# Patient Record
Sex: MALE | Race: BLACK OR AFRICAN AMERICAN | NOT HISPANIC OR LATINO | URBAN - METROPOLITAN AREA
[De-identification: names, ages, dates, MRNs, and addresses within clinical notes are randomized per-mention and may not be internally consistent; named-entity substitution may affect disease eponyms.]

---

## 2018-05-11 ENCOUNTER — INPATIENT (INPATIENT)
Facility: HOSPITAL | Age: 48
LOS: 5 days | Discharge: HOME | DRG: 227 | End: 2018-05-17
Attending: INTERNAL MEDICINE | Admitting: INTERNAL MEDICINE
Payer: MEDICAID

## 2018-05-11 VITALS — WEIGHT: 203.05 LBS | HEIGHT: 70 IN

## 2018-05-11 DIAGNOSIS — I25.110 ATHEROSCLEROTIC HEART DISEASE OF NATIVE CORONARY ARTERY WITH UNSTABLE ANGINA PECTORIS: ICD-10-CM

## 2018-05-11 DIAGNOSIS — R55 SYNCOPE AND COLLAPSE: ICD-10-CM

## 2018-05-11 DIAGNOSIS — I50.22 CHRONIC SYSTOLIC (CONGESTIVE) HEART FAILURE: ICD-10-CM

## 2018-05-11 DIAGNOSIS — I82.90 ACUTE EMBOLISM AND THROMBOSIS OF UNSPECIFIED VEIN: ICD-10-CM

## 2018-05-11 LAB
ALBUMIN SERPL ELPH-MCNC: 4.6 G/DL — SIGNIFICANT CHANGE UP (ref 3.3–5.2)
ALP SERPL-CCNC: 73 U/L — SIGNIFICANT CHANGE UP (ref 40–120)
ALT FLD-CCNC: 19 U/L — SIGNIFICANT CHANGE UP
ANION GAP SERPL CALC-SCNC: 14 MMOL/L — SIGNIFICANT CHANGE UP (ref 5–17)
APTT BLD: 47.7 SEC — HIGH (ref 27.5–37.4)
AST SERPL-CCNC: 23 U/L — SIGNIFICANT CHANGE UP
BASOPHILS # BLD AUTO: 0 K/UL — SIGNIFICANT CHANGE UP (ref 0–0.2)
BASOPHILS NFR BLD AUTO: 0.2 % — SIGNIFICANT CHANGE UP (ref 0–2)
BILIRUB SERPL-MCNC: 0.3 MG/DL — LOW (ref 0.4–2)
BUN SERPL-MCNC: 10 MG/DL — SIGNIFICANT CHANGE UP (ref 8–20)
CALCIUM SERPL-MCNC: 9.5 MG/DL — SIGNIFICANT CHANGE UP (ref 8.6–10.2)
CHLORIDE SERPL-SCNC: 98 MMOL/L — SIGNIFICANT CHANGE UP (ref 98–107)
CO2 SERPL-SCNC: 27 MMOL/L — SIGNIFICANT CHANGE UP (ref 22–29)
CREAT SERPL-MCNC: 1.24 MG/DL — SIGNIFICANT CHANGE UP (ref 0.5–1.3)
EOSINOPHIL # BLD AUTO: 0.2 K/UL — SIGNIFICANT CHANGE UP (ref 0–0.5)
EOSINOPHIL NFR BLD AUTO: 4 % — SIGNIFICANT CHANGE UP (ref 0–5)
GLUCOSE SERPL-MCNC: 90 MG/DL — SIGNIFICANT CHANGE UP (ref 70–115)
HCT VFR BLD CALC: 49.7 % — SIGNIFICANT CHANGE UP (ref 42–52)
HGB BLD-MCNC: 16.5 G/DL — SIGNIFICANT CHANGE UP (ref 14–18)
INR BLD: 1.29 RATIO — HIGH (ref 0.88–1.16)
LYMPHOCYTES # BLD AUTO: 2.4 K/UL — SIGNIFICANT CHANGE UP (ref 1–4.8)
LYMPHOCYTES # BLD AUTO: 44.9 % — SIGNIFICANT CHANGE UP (ref 20–55)
MCHC RBC-ENTMCNC: 29.3 PG — SIGNIFICANT CHANGE UP (ref 27–31)
MCHC RBC-ENTMCNC: 33.2 G/DL — SIGNIFICANT CHANGE UP (ref 32–36)
MCV RBC AUTO: 88.3 FL — SIGNIFICANT CHANGE UP (ref 80–94)
MONOCYTES # BLD AUTO: 0.6 K/UL — SIGNIFICANT CHANGE UP (ref 0–0.8)
MONOCYTES NFR BLD AUTO: 11.6 % — HIGH (ref 3–10)
NEUTROPHILS # BLD AUTO: 2.1 K/UL — SIGNIFICANT CHANGE UP (ref 1.8–8)
NEUTROPHILS NFR BLD AUTO: 39.3 % — SIGNIFICANT CHANGE UP (ref 37–73)
NT-PROBNP SERPL-SCNC: 90 PG/ML — SIGNIFICANT CHANGE UP (ref 0–300)
PLATELET # BLD AUTO: 197 K/UL — SIGNIFICANT CHANGE UP (ref 150–400)
POTASSIUM SERPL-MCNC: 4.5 MMOL/L — SIGNIFICANT CHANGE UP (ref 3.5–5.3)
POTASSIUM SERPL-SCNC: 4.5 MMOL/L — SIGNIFICANT CHANGE UP (ref 3.5–5.3)
PROT SERPL-MCNC: 7.2 G/DL — SIGNIFICANT CHANGE UP (ref 6.6–8.7)
PROTHROM AB SERPL-ACNC: 14.3 SEC — HIGH (ref 9.8–12.7)
RBC # BLD: 5.63 M/UL — SIGNIFICANT CHANGE UP (ref 4.6–6.2)
RBC # FLD: 12.8 % — SIGNIFICANT CHANGE UP (ref 11–15.6)
SODIUM SERPL-SCNC: 139 MMOL/L — SIGNIFICANT CHANGE UP (ref 135–145)
TROPONIN T SERPL-MCNC: <0.01 NG/ML — SIGNIFICANT CHANGE UP (ref 0–0.06)
TROPONIN T SERPL-MCNC: <0.01 NG/ML — SIGNIFICANT CHANGE UP (ref 0–0.06)
WBC # BLD: 5.3 K/UL — SIGNIFICANT CHANGE UP (ref 4.8–10.8)
WBC # FLD AUTO: 5.3 K/UL — SIGNIFICANT CHANGE UP (ref 4.8–10.8)

## 2018-05-11 PROCEDURE — 93010 ELECTROCARDIOGRAM REPORT: CPT

## 2018-05-11 PROCEDURE — 99223 1ST HOSP IP/OBS HIGH 75: CPT

## 2018-05-11 PROCEDURE — 99291 CRITICAL CARE FIRST HOUR: CPT

## 2018-05-11 PROCEDURE — 71045 X-RAY EXAM CHEST 1 VIEW: CPT | Mod: 26

## 2018-05-11 PROCEDURE — 99497 ADVNCD CARE PLAN 30 MIN: CPT | Mod: 25

## 2018-05-11 RX ORDER — METOPROLOL TARTRATE 50 MG
0 TABLET ORAL
Qty: 0 | Refills: 0 | COMMUNITY

## 2018-05-11 RX ORDER — RIVAROXABAN 15 MG-20MG
15 KIT ORAL
Qty: 0 | Refills: 0 | Status: DISCONTINUED | OUTPATIENT
Start: 2018-05-11 | End: 2018-05-13

## 2018-05-11 RX ORDER — METOPROLOL TARTRATE 50 MG
100 TABLET ORAL DAILY
Qty: 0 | Refills: 0 | Status: DISCONTINUED | OUTPATIENT
Start: 2018-05-12 | End: 2018-05-12

## 2018-05-11 RX ORDER — CLOPIDOGREL BISULFATE 75 MG/1
75 TABLET, FILM COATED ORAL DAILY
Qty: 0 | Refills: 0 | Status: DISCONTINUED | OUTPATIENT
Start: 2018-05-12 | End: 2018-05-17

## 2018-05-11 RX ORDER — HEPARIN SODIUM 5000 [USP'U]/ML
5000 INJECTION INTRAVENOUS; SUBCUTANEOUS EVERY 12 HOURS
Qty: 0 | Refills: 0 | Status: DISCONTINUED | OUTPATIENT
Start: 2018-05-11 | End: 2018-05-11

## 2018-05-11 RX ORDER — HYDRALAZINE HCL 50 MG
10 TABLET ORAL ONCE
Qty: 0 | Refills: 0 | Status: COMPLETED | OUTPATIENT
Start: 2018-05-11 | End: 2018-05-13

## 2018-05-11 RX ORDER — ASPIRIN/CALCIUM CARB/MAGNESIUM 324 MG
81 TABLET ORAL DAILY
Qty: 0 | Refills: 0 | Status: DISCONTINUED | OUTPATIENT
Start: 2018-05-12 | End: 2018-05-17

## 2018-05-11 RX ORDER — ATORVASTATIN CALCIUM 80 MG/1
80 TABLET, FILM COATED ORAL AT BEDTIME
Qty: 0 | Refills: 0 | Status: DISCONTINUED | OUTPATIENT
Start: 2018-05-12 | End: 2018-05-12

## 2018-05-11 RX ORDER — CLOPIDOGREL BISULFATE 75 MG/1
1 TABLET, FILM COATED ORAL
Qty: 0 | Refills: 0 | COMMUNITY

## 2018-05-11 RX ADMIN — Medication 10 MILLIGRAM(S): at 21:12

## 2018-05-11 NOTE — H&P ADULT - PROBLEM SELECTOR PLAN 1
admit to telemetry  EKG reviewed and repeated, diffuse T wave inversions   EKG prn chest pain/palpitations  NTG prn chest pain  ASA, BB, ACE, statin continued  serial troponin  TTE  HbA1c  lipid panel  CT head  carotid doppler  fall precautions  cardiology consulted-[facility] cardiology recs appreciated

## 2018-05-11 NOTE — H&P ADULT - ASSESSMENT
48 yo male with significant cardiac history admitted with near syncope likely due to cardiac etiology in setting of severely reduced systolic chf and apical thrombus

## 2018-05-11 NOTE — ED STATDOCS - PROGRESS NOTE DETAILS
Pt. with dizziness and chest pain. Pt. with multiple cardiac risk factors. labs ordered and pt. will be sent to the main room for further evaluations.

## 2018-05-11 NOTE — ED ADULT NURSE NOTE - CHPI ED SYMPTOMS NEG
no chills/no vomiting/no cough/no back pain/no chest pain/no diaphoresis/no fever/no nausea/no syncope/no shortness of breath

## 2018-05-11 NOTE — ED ADULT NURSE NOTE - OBJECTIVE STATEMENT
pt c/o palpations with dizziness and weakness when he gets up too quickly rr even and unlabored pt h/o heart attack with 2 stents seen at [facility]  a year ago.

## 2018-05-11 NOTE — CONSULT NOTE ADULT - SUBJECTIVE AND OBJECTIVE BOX
CARDIOLOGY CONSULTATION NOTE   Consult requested by:      Reason for Consultation:     History obtained by: Patient, family and medical record     obtained: No    Chief complaint:    Patient is a 47y old  Male who presents with a chief complaint of     HPI:        REVIEW OF SYMPTOMS:   Constitutional: Denied: fever, chills, weight loss or gain  Eyes: Denied: reddened ayes, eye discharge, eye pain  ENMT: Denied: ear pain, nasal discharge, mouth pain, throat pain or swelling  Cardiovascular: Denied: chest pain,  Chest pressure, palpitation, arrhythmia, irregular or fast heart beats, edema  Respiratory: Denied: cough, phlegm production, wheezes, dyspnea, orthopnea, PND,   GI: Denied: Abdominal pain, nausea, vomiting, diarrhea, constipation, melena, rectal bleed  : Denied: hematuria, frequency  Musculoskeletal: Denied: Muscle aches, weakness, pain  Hematology/lymp: Denied: Bleeding disorder, anemia, blood clotting  Neuro: Denied: Headache, light headedness, dizziness, numbness, aphasia, dysarthria, seizure,  syncope, near syncope  Psych: Denied: depression, anxiety  Integumentary/skin: Denied: rash, bruises, ecchymosis, itching  Allergy/Immunology: denied environmental or drug allergies    ALL OTHER REVIEW OF SYSTEMS ARE NEGATIVE.    MEDICATIONS  (STANDING):  heparin  Injectable 5000 Unit(s) SubCutaneous every 12 hours    MEDICATIONS  (PRN):        PAST MEDICAL & SURGICAL HISTORY:  CAD (coronary artery disease)  Stented coronary artery  Thrombosis: apical  MI (myocardial infarction): 2 coronary stents  HTN (hypertension)  No significant past surgical history      FAMILY HISTORY:      SOCIAL HISTORY:    CIGARETTES:  No    ALCOHOL: No    DRUGS: No    Vital Signs Last 24 Hrs  T(C): 37.1 (11 May 2018 19:11), Max: 37.1 (11 May 2018 19:11)  T(F): 98.8 (11 May 2018 19:11), Max: 98.8 (11 May 2018 19:11)  HR: 63 (11 May 2018 19:11) (63 - 81)  BP: 157/94 (11 May 2018 19:11) (143/87 - 157/94)  BP(mean): --  RR: 20 (11 May 2018 19:11) (20 - 20)  SpO2: 96% (11 May 2018 19:11) (94% - 96%)    PHYSICAL EXAM:      Constitutional: No fever, chills, NAD, Comfortable    Eyes: Not reddened, no discharge    ENMT: No discharge, No pain    Neck: No JVD, No Bruit    Back: No CVA tenderness    Respiratory: Clear to auscultation bilaterally    Cardiovascular: RRR, Normal S1-2, No S3-, No friction rub murmur    Gastrointestinal: Soft, NT/ND. BS+, No organomegaly    Extremities: No edema    Vascular: Distal pulses intact    Neurological: Alert, awake, oriented, able to move extremities, speach is clear    Skin: No ecchymosis, no rash    Musculoskeletal: Non tender, no weaknss    Psychiatric: Aproppriate mood, no anxiety        INTERPRETATION OF TELEMETRY:    ECG:    I&O's Detail      LABS:                        16.5   5.3   )-----------( 197      ( 11 May 2018 15:25 )             49.7     05-11    139  |  98  |  10.0  ----------------------------<  90  4.5   |  27.0  |  1.24    Ca    9.5      11 May 2018 15:25    TPro  7.2  /  Alb  4.6  /  TBili  0.3<L>  /  DBili  x   /  AST  23  /  ALT  19  /  AlkPhos  73  05-11    CARDIAC MARKERS ( 11 May 2018 15:25 )  x     / <0.01 ng/mL / x     / x     / x          PT/INR - ( 11 May 2018 15:25 )   PT: 14.3 sec;   INR: 1.29 ratio         PTT - ( 11 May 2018 15:25 )  PTT:47.7 sec    I&O's Summary      RADIOLOGY & ADDITIONAL STUDIES:  X-ray:    PREVIOUS DIAGNOSTIC TESTING:      ECHO  FINDINGS: Date:                : LVEF=          ; RV function:       ; Valvular abnormalities: Mitral valve:           ;  Aortic valve:              ;Tricuspid valve:         ; Pulmonary pressures:        Pericardium:      STRESS    FINDINGS: Date:            ;      CATHETERIZATION  FINDINGS:  Date:              :  LAD:                       ;  LCx:                        ; RCA:                ;     Assesment and Plan:  This is a 47yMale with history of Syncope and collapse  H/o or current diagnosis of HF- ACEI/ARB contraindication unknown  MEWS Score  CAD (coronary artery disease)  Stented coronary artery  Thrombosis  MI (myocardial infarction)  HTN (hypertension)  CAD (coronary artery disease)  Stented coronary artery  Thrombosis  MI (myocardial infarction)  HTN (hypertension)  Near syncope  No significant past surgical history  No significant past surgical history  PALPITATIONS/DIZZY  Thrombosis  HTN (hypertension)  Stented coronary artery  CAD (coronary artery disease)   presents with Patient is a 47y old  Male who presents with a chief complaint of   1) CARDIOLOGY CONSULTATION NOTE       History obtained by: Patient, family and medical record     obtained: Yes    Chief complaint:    Patient is a 47y old  Male who presents with a chief complaint of     HPI:  47M hx MI s/p PCI in 2006 and STEMI in 9/2017 at [facility] elevation in  V2-4 LHC at [facility]: 70-80% mRCA in-stent restenosis and diffuse dLAD in stent restenosis noted on cath; mRCA stented with PEPE,  had LVEF at 35%, apical thrombus, maintained on AC; who was  deemed not  a candidate for life vest as pt was w/o insurance at that time. He lives in [facility] and [place] to [place] this Monday. He had an episode of lightheadedness, nearly falling down associated with palpitation this am thus came to ER. He stated he had similar episode while he was in [facility]. Denied dyspnea, orthopnea PND, edema, CP, nausea, vomiting. hematuria, melena.    REVIEW OF SYMPTOMS:   Constitutional: Denied: fever, chills, weight loss or gain  Eyes: Denied: reddened ayes, eye discharge, eye pain  ENMT: Denied: ear pain, nasal discharge, mouth pain, throat pain or swelling  Cardiovascular: Denied: chest pain,  Chest pressure,edema  Respiratory: Denied: cough, phlegm production, wheezes, dyspnea, orthopnea, PND,   GI: Denied: Abdominal pain, nausea, vomiting, diarrhea, constipation, melena, rectal bleed  : Denied: hematuria, frequency  Musculoskeletal: Denied: Muscle aches, weakness, pain  Hematology/lymp: Denied: Bleeding disorder, anemia, blood clotting  Neuro: Denied: Headache, light headedness, dizziness, numbness, aphasia, dysarthria, seizure,    Psych: Denied: depression, anxiety  Integumentary/skin: Denied: rash, bruises, ecchymosis, itching  Allergy/Immunology: denied environmental or drug allergies    ALL OTHER REVIEW OF SYSTEMS ARE NEGATIVE.    MEDICATIONS  (STANDING):  heparin  Injectable 5000 Unit(s) SubCutaneous every 12 hours    · 	enalapril 10 mg oral tablet: 1 tab(s) orally once a day, Last Dose Taken:    · 	Plavix 75 mg oral tablet: 1 tab(s) orally once a day, Last Dose Taken:    · 	Xarelto 20 mg oral tablet: 1 tab(s) orally once a day (in the evening), Last Dose Taken:    · 	metoprolol succinate 100 mg oral tablet, extended release: 1 tab(s) orally once a day, Last Dose Taken:    · 	Crestor 20 mg oral tablet: 1 tab(s) orally once a day (at bedtime), Last Dose Taken:      MEDICATIONS  (PRN):        PAST MEDICAL & SURGICAL HISTORY:  CAD (coronary artery disease)  Stented coronary artery  Thrombosis: apical  MI (myocardial infarction): 2 coronary stents  HTN (hypertension)  No significant past surgical history      FAMILY HISTORY:  NC    SOCIAL HISTORY:    CIGARETTES:  No    ALCOHOL: No    DRUGS: No    Vital Signs Last 24 Hrs  T(C): 37.1 (11 May 2018 19:11), Max: 37.1 (11 May 2018 19:11)  T(F): 98.8 (11 May 2018 19:11), Max: 98.8 (11 May 2018 19:11)  HR: 63 (11 May 2018 19:11) (63 - 81)  BP: 157/94 (11 May 2018 19:11) (143/87 - 157/94)  BP(mean): --  RR: 20 (11 May 2018 19:11) (20 - 20)  SpO2: 96% (11 May 2018 19:11) (94% - 96%)    PHYSICAL EXAM:      Constitutional: No fever, chills, NAD, Comfortable    Eyes: Not reddened, no discharge    ENMT: No discharge, No pain    Neck: No JVD, No Bruit    Back: No CVA tenderness    Respiratory: Clear to auscultation bilaterally    Cardiovascular: RRR, Normal S1-2, No S3-, No friction rub murmur    Gastrointestinal: Soft, NT/ND. BS+, No organomegaly    Extremities: No edema    Vascular: Distal pulses intact    Neurological: Alert, awake, oriented, able to move extremities, speach is clear    Skin: No ecchymosis, no rash    Musculoskeletal: Non tender, no weakness    Psychiatric: Appropriate mood, no anxiety        INTERPRETATION OF TELEMETRY:    ECG:   Ventricular Rate 80 BPM    Atrial Rate 80 BPM    P-R Interval 204 ms    QRS Duration 96 ms    Q-T Interval 374 ms    QTC Calculation(Bezet) 431 ms    P Axis 71 degrees    R Axis -23 degrees    T Axis 133 degrees    Diagnosis Line *** Poor data quality, interpretation may be adversely affected  Normal sinus rhythm  Inferior infarct , age undetermined  Anterolateral infarct , age undetermined  Abnormal ECG    Confirmed by Kelton Lieberman ([id number]) on 5/11/2018 6:04:26 PM    I&O's Detail      LABS:                        16.5   5.3   )-----------( 197      ( 11 May 2018 15:25 )             49.7     05-11    139  |  98  |  10.0  ----------------------------<  90  4.5   |  27.0  |  1.24    Ca    9.5      11 May 2018 15:25    TPro  7.2  /  Alb  4.6  /  TBili  0.3<L>  /  DBili  x   /  AST  23  /  ALT  19  /  AlkPhos  73  05-11    CARDIAC MARKERS ( 11 May 2018 15:25 )  x     / <0.01 ng/mL / x     / x     / x          PT/INR - ( 11 May 2018 15:25 )   PT: 14.3 sec;   INR: 1.29 ratio         PTT - ( 11 May 2018 15:25 )  PTT:47.7 sec    I&O's Summary

## 2018-05-11 NOTE — ED PROVIDER NOTE - CARE PLAN
Principal Discharge DX:	Near syncope  Secondary Diagnosis:	CAD (coronary artery disease)  Secondary Diagnosis:	Stented coronary artery

## 2018-05-11 NOTE — H&P ADULT - HISTORY OF PRESENT ILLNESS
Pt is a 48 yo male with a pmh/o CAD s/p PCI in 2006 at [facility] and MI in 9/17 at [facility] with recanalization of RCA and LAD restenosis with a mid RCA PEPE placement, found to have EF 35% and apical thrombus however was not deemed candidate for life vest as pt w/o insurance at time and lives between [place] and . Pt today presents to ED after experiencing episode of dizziness, palpitations and feeling as if he were going to faint. Pt states he had an episode where he 'passed out' and lost consciousness in [place] [facility] [place] while working out but did not undergo evaluation at that time. Pt denies any cp, diaphoresis, n/v/d, gu sx, gi sx, fever, cough, leg swelling. Pt states compliance with med regimen asa, plavix, xarelto.

## 2018-05-11 NOTE — ED PROVIDER NOTE - OBJECTIVE STATEMENT
48 y/o M w/ PMHx of CAD, apical thrombosis and HTN s/p MI with 2 stents on Xarelto, Aspirin (81mg) and Plavix presents with palpitation, light-headedness and dizziness. He describes sx as feeling as though he is going to fall down. Pt states he went to [facility] in [place] last year and had stent placed. Denies CP, SOB, abd pain, N/V, blurred vision, motor sensory or any other complaints at this time. 46 y/o M w/ PMHx of CAD, apical thrombosis and HTN s/p MI with 2 stents on Xarelto, Aspirin (81mg) and Plavix presents with palpitation, light-headedness and dizziness and almost passed out.  The patient had echo done 6 months ago showing EF of 35 percent.  He describes sx as feeling as though he is going to fall down. Pt states he went to [facility] in [place] last year and had stent placed. Denies CP, SOB, abd pain, N/V, blurred vision, motor sensory or any other complaints at this time.

## 2018-05-11 NOTE — ED PROVIDER NOTE - MEDICAL DECISION MAKING DETAILS
46 y/o M with CAD, MI stents and apical thrombosis concerning for cardiac dizziness. Will do labs and most likely admit.

## 2018-05-11 NOTE — ED ADULT NURSE NOTE - PMH
CAD (coronary artery disease)    HTN (hypertension)    MI (myocardial infarction)  2 coronary stents  Stented coronary artery    Thrombosis  apical

## 2018-05-11 NOTE — ED PROVIDER NOTE - PMH
CAD (coronary artery disease)    HTN (hypertension)    MI (myocardial infarction)    Stented coronary artery    Thrombosis  apical

## 2018-05-12 LAB
ALBUMIN SERPL ELPH-MCNC: 4.4 G/DL — SIGNIFICANT CHANGE UP (ref 3.3–5.2)
ALP SERPL-CCNC: 69 U/L — SIGNIFICANT CHANGE UP (ref 40–120)
ALT FLD-CCNC: 18 U/L — SIGNIFICANT CHANGE UP
AMPHET UR-MCNC: NEGATIVE — SIGNIFICANT CHANGE UP
ANION GAP SERPL CALC-SCNC: 12 MMOL/L — SIGNIFICANT CHANGE UP (ref 5–17)
APPEARANCE UR: CLEAR — SIGNIFICANT CHANGE UP
APTT BLD: 38.5 SEC — HIGH (ref 27.5–37.4)
AST SERPL-CCNC: 20 U/L — SIGNIFICANT CHANGE UP
BARBITURATES UR SCN-MCNC: NEGATIVE — SIGNIFICANT CHANGE UP
BENZODIAZ UR-MCNC: NEGATIVE — SIGNIFICANT CHANGE UP
BILIRUB SERPL-MCNC: 0.5 MG/DL — SIGNIFICANT CHANGE UP (ref 0.4–2)
BILIRUB UR-MCNC: NEGATIVE — SIGNIFICANT CHANGE UP
BUN SERPL-MCNC: 12 MG/DL — SIGNIFICANT CHANGE UP (ref 8–20)
CALCIUM SERPL-MCNC: 9 MG/DL — SIGNIFICANT CHANGE UP (ref 8.6–10.2)
CHLORIDE SERPL-SCNC: 100 MMOL/L — SIGNIFICANT CHANGE UP (ref 98–107)
CHOLEST SERPL-MCNC: 138 MG/DL — SIGNIFICANT CHANGE UP (ref 110–199)
CO2 SERPL-SCNC: 30 MMOL/L — HIGH (ref 22–29)
COCAINE METAB.OTHER UR-MCNC: NEGATIVE — SIGNIFICANT CHANGE UP
COLOR SPEC: YELLOW — SIGNIFICANT CHANGE UP
CREAT SERPL-MCNC: 1 MG/DL — SIGNIFICANT CHANGE UP (ref 0.5–1.3)
DIFF PNL FLD: NEGATIVE — SIGNIFICANT CHANGE UP
GLUCOSE SERPL-MCNC: 89 MG/DL — SIGNIFICANT CHANGE UP (ref 70–115)
GLUCOSE UR QL: NEGATIVE MG/DL — SIGNIFICANT CHANGE UP
HBA1C BLD-MCNC: 5.9 % — HIGH (ref 4–5.6)
HCT VFR BLD CALC: 51.4 % — SIGNIFICANT CHANGE UP (ref 42–52)
HDLC SERPL-MCNC: 44 MG/DL — LOW
HGB BLD-MCNC: 16.9 G/DL — SIGNIFICANT CHANGE UP (ref 14–18)
INR BLD: 1.12 RATIO — SIGNIFICANT CHANGE UP (ref 0.88–1.16)
KETONES UR-MCNC: NEGATIVE — SIGNIFICANT CHANGE UP
LEUKOCYTE ESTERASE UR-ACNC: NEGATIVE — SIGNIFICANT CHANGE UP
LIPID PNL WITH DIRECT LDL SERPL: 84 MG/DL — SIGNIFICANT CHANGE UP
MAGNESIUM SERPL-MCNC: 2.2 MG/DL — SIGNIFICANT CHANGE UP (ref 1.6–2.6)
MCHC RBC-ENTMCNC: 28.6 PG — SIGNIFICANT CHANGE UP (ref 27–31)
MCHC RBC-ENTMCNC: 32.9 G/DL — SIGNIFICANT CHANGE UP (ref 32–36)
MCV RBC AUTO: 87.1 FL — SIGNIFICANT CHANGE UP (ref 80–94)
METHADONE UR-MCNC: NEGATIVE — SIGNIFICANT CHANGE UP
NITRITE UR-MCNC: NEGATIVE — SIGNIFICANT CHANGE UP
OPIATES UR-MCNC: NEGATIVE — SIGNIFICANT CHANGE UP
PCP SPEC-MCNC: SIGNIFICANT CHANGE UP
PCP UR-MCNC: NEGATIVE — SIGNIFICANT CHANGE UP
PH UR: 7 — SIGNIFICANT CHANGE UP (ref 5–8)
PHOSPHATE SERPL-MCNC: 3.9 MG/DL — SIGNIFICANT CHANGE UP (ref 2.4–4.7)
PLATELET # BLD AUTO: 207 K/UL — SIGNIFICANT CHANGE UP (ref 150–400)
POTASSIUM SERPL-MCNC: 3.8 MMOL/L — SIGNIFICANT CHANGE UP (ref 3.5–5.3)
POTASSIUM SERPL-SCNC: 3.8 MMOL/L — SIGNIFICANT CHANGE UP (ref 3.5–5.3)
PROT SERPL-MCNC: 7.4 G/DL — SIGNIFICANT CHANGE UP (ref 6.6–8.7)
PROT UR-MCNC: NEGATIVE MG/DL — SIGNIFICANT CHANGE UP
PROTHROM AB SERPL-ACNC: 12.3 SEC — SIGNIFICANT CHANGE UP (ref 9.8–12.7)
RBC # BLD: 5.9 M/UL — SIGNIFICANT CHANGE UP (ref 4.6–6.2)
RBC # FLD: 13 % — SIGNIFICANT CHANGE UP (ref 11–15.6)
SODIUM SERPL-SCNC: 142 MMOL/L — SIGNIFICANT CHANGE UP (ref 135–145)
SP GR SPEC: 1.01 — SIGNIFICANT CHANGE UP (ref 1.01–1.02)
THC UR QL: NEGATIVE — SIGNIFICANT CHANGE UP
TOTAL CHOLESTEROL/HDL RATIO MEASUREMENT: 3 RATIO — LOW (ref 3.4–9.6)
TRIGL SERPL-MCNC: 48 MG/DL — SIGNIFICANT CHANGE UP (ref 10–200)
TROPONIN T SERPL-MCNC: <0.01 NG/ML — SIGNIFICANT CHANGE UP (ref 0–0.06)
TSH SERPL-MCNC: 1.35 UIU/ML — SIGNIFICANT CHANGE UP (ref 0.27–4.2)
UROBILINOGEN FLD QL: NEGATIVE MG/DL — SIGNIFICANT CHANGE UP
WBC # BLD: 5.2 K/UL — SIGNIFICANT CHANGE UP (ref 4.8–10.8)
WBC # FLD AUTO: 5.2 K/UL — SIGNIFICANT CHANGE UP (ref 4.8–10.8)

## 2018-05-12 PROCEDURE — 99233 SBSQ HOSP IP/OBS HIGH 50: CPT

## 2018-05-12 PROCEDURE — 93880 EXTRACRANIAL BILAT STUDY: CPT | Mod: 26

## 2018-05-12 PROCEDURE — 99222 1ST HOSP IP/OBS MODERATE 55: CPT

## 2018-05-12 PROCEDURE — 70450 CT HEAD/BRAIN W/O DYE: CPT | Mod: 26

## 2018-05-12 RX ORDER — METOPROLOL TARTRATE 50 MG
25 TABLET ORAL
Qty: 0 | Refills: 0 | Status: DISCONTINUED | OUTPATIENT
Start: 2018-05-12 | End: 2018-05-12

## 2018-05-12 RX ORDER — ROSUVASTATIN CALCIUM 5 MG/1
20 TABLET ORAL AT BEDTIME
Qty: 0 | Refills: 0 | Status: DISCONTINUED | OUTPATIENT
Start: 2018-05-12 | End: 2018-05-17

## 2018-05-12 RX ORDER — METOPROLOL TARTRATE 50 MG
25 TABLET ORAL
Qty: 0 | Refills: 0 | Status: DISCONTINUED | OUTPATIENT
Start: 2018-05-13 | End: 2018-05-13

## 2018-05-12 RX ADMIN — RIVAROXABAN 15 MILLIGRAM(S): KIT at 05:09

## 2018-05-12 RX ADMIN — Medication 10 MILLIGRAM(S): at 05:09

## 2018-05-12 RX ADMIN — Medication 100 MILLIGRAM(S): at 05:09

## 2018-05-12 RX ADMIN — RIVAROXABAN 15 MILLIGRAM(S): KIT at 17:19

## 2018-05-12 RX ADMIN — ROSUVASTATIN CALCIUM 20 MILLIGRAM(S): 5 TABLET ORAL at 21:59

## 2018-05-12 RX ADMIN — CLOPIDOGREL BISULFATE 75 MILLIGRAM(S): 75 TABLET, FILM COATED ORAL at 12:54

## 2018-05-12 RX ADMIN — Medication 81 MILLIGRAM(S): at 12:54

## 2018-05-12 NOTE — PROGRESS NOTE ADULT - SUBJECTIVE AND OBJECTIVE BOX
Patient seen and examined . S/p  , POD #     CC :     HPI:  Pt is a 46 yo male with a pmh/o CAD s/p PCI in 2006 at [facility] and MI in 9/17 at [facility] with recanalization of RCA and LAD restenosis with a mid RCA PEPE placement, found to have EF 35% and apical thrombus however was not deemed candidate for life vest as pt w/o insurance at time and lives between [facility] and . Pt today presents to ED after experiencing episode of dizziness, palpitations and feeling as if he were going to faint. Pt states he had an episode where he 'passed out' and lost consciousness in [facility] while working out but did not undergo evaluation at that time. Pt denies any cp, diaphoresis, n/v/d, gu sx, gi sx, fever, cough, leg swelling. Pt states compliance with med regimen asa, plavix, xarelto. (11 May 2018 20:04)      PAST MEDICAL & SURGICAL HISTORY:  CAD (coronary artery disease)  Stented coronary artery  Thrombosis: apical  MI (myocardial infarction): 2 coronary stents  HTN (hypertension)  PCI  No significant past surgical history      MEDICATIONS  (STANDING):  aspirin enteric coated 81 milliGRAM(s) Oral daily  atorvastatin 80 milliGRAM(s) Oral at bedtime  clopidogrel Tablet 75 milliGRAM(s) Oral daily  enalapril 10 milliGRAM(s) Oral daily  metoprolol succinate  milliGRAM(s) Oral daily  rivaroxaban 15 milliGRAM(s) Oral two times a day    MEDICATIONS  (PRN):  hydrALAZINE Injectable 10 milliGRAM(s) IV Push once PRN sbp >170      LABS:                          16.9   5.2   )-----------( 207      ( 12 May 2018 06:01 )             51.4     05-12    142  |  100  |  12.0  ----------------------------<  89  3.8   |  30.0<H>  |  1.00    Ca    9.0      12 May 2018 06:01  Phos  3.9     05-12  Mg     2.2     05-12    TPro  7.4  /  Alb  4.4  /  TBili  0.5  /  DBili  x   /  AST  20  /  ALT  18  /  AlkPhos  69  05-12    PT/INR - ( 12 May 2018 06:01 )   PT: 12.3 sec;   INR: 1.12 ratio         PTT - ( 12 May 2018 06:01 )  PTT:38.5 sec      RADIOLOGY & ADDITIONAL TESTS:    < from: Xray Chest 1 View AP/PA. (05.11.18 @ 15:12) >   EXAM:  XR CHEST AP OR PA 1V                          PROCEDURE DATE:  05/11/2018          INTERPRETATION:  CHEST AP PORTABLE:    History: Chest Pain.     Date and time of exam: 5/11/2018 2:54 PM.    Technique: A single AP view of the chest was obtained.    Comparison exam: No prior exam.    Findings:  The lungs are clear. The heart and mediastinum are unremarkable. No hilar   abnormality. No evidence of a pleural effusion. No osseous abnormality..    Impression:  Unremarkable exam..                DAMIAN THAYER M.D., ATTENDING RADIOLOGIST  This document has been electronically signed. May 11 2018  3:28PM    < end of copied text >      < from: 12 Lead ECG (05.11.18 @ 14:03) >    Ventricular Rate 80 BPM    Atrial Rate 80 BPM    P-R Interval 204 ms    QRS Duration 96 ms    Q-T Interval 374 ms    QTC Calculation(Bezet) 431 ms    P Axis 71 degrees    R Axis -23 degrees    T Axis 133 degrees    Diagnosis Line *** Poor data quality, interpretation may be adversely affected  Normal sinus rhythm  Inferior infarct , age undetermined  Anterolateral infarct , age undetermined  Abnormal ECG    Confirmed by Kelton Lieberman ([id number]) on 5/11/2018 6:04:26 PM    < end of copied text >        REVIEW OF SYSTEMS:    CONSTITUTIONAL: No fever, weight loss, or fatigue  EYES: No eye pain, visual disturbances, or discharge  ENMT:  No difficulty hearing, tinnitus, vertigo; No sinus or throat pain  NECK: No pain or stiffness  RESPIRATORY: No cough, wheezing, chills or hemoptysis; No shortness of breath  CARDIOVASCULAR: No chest pain, palpitations, dizziness, or leg swelling  GASTROINTESTINAL: No abdominal or epigastric pain. No nausea, vomiting, or hematemesis; No diarrhea or constipation. No melena or hematochezia.  GENITOURINARY: No dysuria, frequency, hematuria, or incontinence  NEUROLOGICAL: No headaches, memory loss, loss of strength, numbness, or tremors  SKIN: No itching, burning, rashes, or lesions   LYMPH NODES: No enlarged glands  ENDOCRINE: No heat or cold intolerance; No hair loss  MUSCULOSKELETAL:   PSYCHIATRIC: No depression, anxiety, mood swings, or difficulty sleeping  HEME/LYMPH: No easy bruising, or bleeding gums  ALLERGY AND IMMUNOLOGIC: No hives or eczema    Vital Signs Last 24 Hrs  T(C): 36.7 (12 May 2018 09:51), Max: 37.1 (11 May 2018 19:11)  T(F): 98 (12 May 2018 09:51), Max: 98.8 (11 May 2018 19:11)  HR: 58 (12 May 2018 09:51) (58 - 81)  BP: 130/90 (12 May 2018 09:51) (130/90 - 160/102)  BP(mean): --  RR: 16 (12 May 2018 09:51) (16 - 20)  SpO2: 95% (12 May 2018 09:51) (94% - 99%)  PHYSICAL EXAM:    GENERAL: NAD, well-groomed, well-developed  HEAD:  Atraumatic, Normocephalic  EYES: EOMI, PERRLA, conjunctiva and sclera clear  NECK: Supple, No JVD, Normal thyroid  NERVOUS SYSTEM:  Alert & Oriented X3, no focal deficit  CHEST/LUNG: CTA b/l ,  no  rales, rhonchi, wheezing, or rubs  HEART: Regular rate and rhythm; No murmurs, rubs, or gallops  ABDOMEN: Soft, Nontender, Nondistended; Bowel sounds present  EXTREMITIES:  2+ Peripheral Pulses, No clubbing, cyanosis, or edema  LYMPH: No lymphadenopathy noted  SKIN: No rashes or lesions Patient seen and examined . Creole spiking only , RN Elza Claire helped with translation . Doing well , no sob , no cp , no dizziness / lightheadedness     CC : 46 yo male with significant cardiac history admitted with near syncope         PAST MEDICAL & SURGICAL HISTORY:  CAD (coronary artery disease)  Stented coronary artery  Thrombosis: apical  MI (myocardial infarction): 2 coronary stents  HTN (hypertension)  PCI  No significant past surgical history      MEDICATIONS  (STANDING):  aspirin enteric coated 81 milliGRAM(s) Oral daily  atorvastatin 80 milliGRAM(s) Oral at bedtime  clopidogrel Tablet 75 milliGRAM(s) Oral daily  enalapril 10 milliGRAM(s) Oral daily  metoprolol succinate  milliGRAM(s) Oral daily  rivaroxaban 15 milliGRAM(s) Oral two times a day    MEDICATIONS  (PRN):  hydrALAZINE Injectable 10 milliGRAM(s) IV Push once PRN sbp >170      LABS:                          16.9   5.2   )-----------( 207      ( 12 May 2018 06:01 )             51.4     05-12    142  |  100  |  12.0  ----------------------------<  89  3.8   |  30.0<H>  |  1.00    Ca    9.0      12 May 2018 06:01  Phos  3.9     05-12  Mg     2.2     05-12    TPro  7.4  /  Alb  4.4  /  TBili  0.5  /  DBili  x   /  AST  20  /  ALT  18  /  AlkPhos  69  05-12    PT/INR - ( 12 May 2018 06:01 )   PT: 12.3 sec;   INR: 1.12 ratio         PTT - ( 12 May 2018 06:01 )  PTT:38.5 sec      RADIOLOGY & ADDITIONAL TESTS:    < from: Xray Chest 1 View AP/PA. (05.11.18 @ 15:12) >   EXAM:  XR CHEST AP OR PA 1V                          PROCEDURE DATE:  05/11/2018          INTERPRETATION:  CHEST AP PORTABLE:    History: Chest Pain.     Date and time of exam: 5/11/2018 2:54 PM.    Technique: A single AP view of the chest was obtained.    Comparison exam: No prior exam.    Findings:  The lungs are clear. The heart and mediastinum are unremarkable. No hilar   abnormality. No evidence of a pleural effusion. No osseous abnormality..    Impression:  Unremarkable exam..                DAMIAN FAGELMAN M.D., ATTENDING RADIOLOGIST  This document has been electronically signed. May 11 2018  3:28PM    < end of copied text >      < from: 12 Lead ECG (05.11.18 @ 14:03) >    Ventricular Rate 80 BPM    Atrial Rate 80 BPM    P-R Interval 204 ms    QRS Duration 96 ms    Q-T Interval 374 ms    QTC Calculation(Bezet) 431 ms    P Axis 71 degrees    R Axis -23 degrees    T Axis 133 degrees    Diagnosis Line *** Poor data quality, interpretation may be adversely affected  Normal sinus rhythm  Inferior infarct , age undetermined  Anterolateral infarct , age undetermined  Abnormal ECG    Confirmed by Kelton Lieberman ([id number]) on 5/11/2018 6:04:26 PM    < end of copied text >        REVIEW OF SYSTEMS:    As above , all systems reviewed and are negative     Vital Signs Last 24 Hrs  T(C): 36.7 (12 May 2018 09:51), Max: 37.1 (11 May 2018 19:11)  T(F): 98 (12 May 2018 09:51), Max: 98.8 (11 May 2018 19:11)  HR: 58 (12 May 2018 09:51) (58 - 81)  BP: 130/90 (12 May 2018 09:51) (130/90 - 160/102)  BP(mean): --  RR: 16 (12 May 2018 09:51) (16 - 20)  SpO2: 95% (12 May 2018 09:51) (94% - 99%)    PHYSICAL EXAM:    GENERAL: NAD, well-groomed, well-developed  HEAD:  Atraumatic, Normocephalic  EYES: EOMI, PERRLA, conjunctiva and sclera clear  NECK: Supple, No JVD, Normal thyroid  NERVOUS SYSTEM:  Alert & Oriented X3, no focal deficit  CHEST/LUNG: CTA b/l ,  no  rales, rhonchi, wheezing, or rubs  HEART: Regular rate and rhythm; No murmurs, rubs, or gallops  ABDOMEN: Soft, Nontender, Nondistended; Bowel sounds present  EXTREMITIES:  2+ Peripheral Pulses, No clubbing, cyanosis, or edema  LYMPH: No lymphadenopathy noted  SKIN: No rashes or lesions

## 2018-05-12 NOTE — PROGRESS NOTE ADULT - ASSESSMENT
Assessment and Recommendation:   · Assessment		  47M hx MI s/p PCI in 2006 and STEMI in 9/2017 at [facility] elevation in  V2-4 LHC at [facility]: 70-80% mRCA in-stent restenosis and diffuse dLAD in stent restenosis noted on cath; mRCA tstented with PEPE,  had LVEF at 35%, apical thrombus, maintained on AC; who was  deemed not  a candidate for life vest as pt was w/o insurance at that time. He lives in [facility] and [place] this Monday. He had an episode of lightheadedness, nearly falling down associated with palpitation this am thus came to ER. He stated he had similar episode while he was in [facility]. Denied dyspnea, orthopnea PND, edema, CP, nausea, vomiting. hematuria, melena.    A/  Near Syncope, Lightheadedness, Dizziness  Palpitation  Ischemic CM  Chronic Systolic HF, HFrEF  LV apical thrombus  CAD: h/o MI, sp PCI  HTN    P/  Telemetry Monitoring  Echo  Serial CE, negative x 3.    BNP wnl  BP stable 130/90    Cont:   enalapril 10 mg daily  Plavix 75 mg daily  Xarelto 20 mg daily   metoprolol succinate 100 mg  daily  Crestor 20 mg daily Assessment and Recommendation:   · Assessment		  47M hx MI s/p PCI in 2006 and STEMI in 9/2017 at [facility] elevation in  V2-4 C at [facility]: Significant CAD disease,  had LVEF at 35%, apical thrombus, maintained on AC; who was deemed not  a candidate for life vest as pt was w/o insurance at that time.    Patient had IR s1s2, periods of  Wenckebach, and 2 to 1 block with bradycardia down to mid 30's.       A/  ·	Near Syncope, Lightheadedness, Dizziness - ct tele, Serial CE, negative x 3.    ·	Palpitations- resolved.    ·	Ischemic CM ct bb and statins, BNP wnl.     ·	Chronic Systolic HF, HFrEF, ct ACE.    ·	LV apical thrombus repeat TTE and ct Xaralto  ·	CAD: h/o MI, sp PCI - ct with Plavix   ·	HTN - ct ace and bb.  BP stable 130/90 on medications     Medications  enalapril 10 mg daily  Plavix 75 mg daily  Xarelto 20 mg daily   metoprolol tartrate 25 mg  bid  Crestor 20 mg daily Assessment and Recommendation:   · Assessment		  47M hx MI s/p PCI in 2006 and STEMI in 9/2017 at [facility] elevation in  V2-4 LHC at [facility]: Significant CAD disease,  had LVEF at 35%, apical thrombus, maintained on AC; who was deemed not  a candidate for life vest as pt was w/o insurance at that time.   Patient admitted for syncope   Patient had IR s1s2, periods of  Wenckebach, and 2 to 1 block with bradycardia down to mid 30's.       A/  ·	Near Syncope, Lightheadedness, Dizziness - ct tele, Serial CE, negative x 3.    ·	Palpitations- resolved.    ·	Ischemic CM ct bb and statins, BNP wnl.     ·	Chronic Systolic HF, HFrEF, on ACE.    ·	LV apical thrombus repeat TTE and ct Xarelto  ·	CAD: h/o MI, sp PCI - ct with Plavix   ·	HTN - ct ace and bb.  BP stable 130/90 on medications     Medications  enalapril 10 mg daily  Plavix 75 mg daily  Xarelto 20 mg daily   metoprolol tartrate 25 mg  bid  Crestor 20 mg daily 47M hx MI s/p PCI in 2006 and STEMI in 9/2017 at [facility] elevation in  V2-4 C at [facility]: Significant CAD disease,  had LVEF at 35%, apical thrombus, maintained on AC; who was deemed not  a candidate for life vest as pt was w/o insurance at that time.   Patient admitted for syncope   Patient had IR s1s2, periods of  Wenckebach, and 2 to 1 block with bradycardia down to mid 30's.       A/  ·	Near Syncope, Lightheadedness, Dizziness - ct tele, Serial CE, negative x 3.    ·	Palpitations- resolved.    ·	Ischemic CM ct bb (reduced dose b/o bradycardia)  and statins, BNP wnl.     ·	Chronic Systolic HF, HFrEF, on ACE.    ·	h/o LV apical thrombus on Xarelto  ·	Echo done: EF 35-40. WMA+. Severe septa hypertrophy.   ·	CAD: h/o MI, sp PCI - ct with Plavix   ·	HTN - ct ace and bb.  BP stable 130/90 on medications     Medications  enalapril 10 mg daily  Plavix 75 mg daily  Xarelto 20 mg daily   metoprolol tartrate 25 mg  bid  Crestor 20 mg daily

## 2018-05-12 NOTE — PROGRESS NOTE ADULT - ASSESSMENT
48 yo male with significant cardiac history admitted with near syncope likely due to cardiac etiology in setting of severely reduced systolic chf and apical thrombus       Problem/Plan - 1:  ·  Problem: Near syncope.  Plan: admit to telemetry  EKG reviewed and repeated, diffuse T wave inversions   EKG prn chest pain/palpitations  NTG prn chest pain  ASA, BB, ACE, statin continued  serial troponin  TTE  HbA1c  lipid panel  CT head  carotid doppler  fall precautions  cardiology consulted , note noted and appreciated .       Problem/Plan - 2:  ·  Problem: Chronic systolic congestive heart failure.  Plan: strict i/o  orthostatic bp  not in acute failure, lungs CTA BL, no edema noted.      Problem/Plan - 3:  ·  Problem:  Apical Thrombosis.  Plan: cont xarelto  no active bleeding noted.      Problem/Plan - 4:  ·  Problem: Coronary artery disease involving native coronary artery of native heart with unstable angina pectoris.  Plan: Cont asa, crestor, metoprolol, enalapril  f/u HbA1c and lipid panel  TTE pending . 46 yo male with significant cardiac history admitted with near syncope likely due to cardiac etiology in setting of severely reduced systolic chf and apical thrombus       Problem/Plan - 1:  ·  Problem: Near syncope.  Plan: continue   telemetry  EKG reviewed and repeated, diffuse T wave inversions   EKG prn chest pain/palpitations  NTG prn chest pain  ASA, BB, ACE, statin continued  serial troponin x 3 negative   TTE - pending   HbA1c - 5.9 - prediabetic , diet , education , follow HgA1C in 3 months   lipid panel - noted   CT head/  carotid doppler ordered and pending   fall precautions  cardiology consulted , note noted and appreciated .  Awaiting for TTE report and further mgmt .     Problem/Plan - 2:  ·  Problem: Chronic systolic congestive heart failure.  Plan: strict i/o  orthostatic bp  not in acute failure, lungs CTA BL, no edema noted.      Problem/Plan - 3:  ·  Problem:  Apical Thrombosis.  Plan: cont xarelto  no active bleeding noted.      Problem/Plan - 4:  ·  Problem: Coronary artery disease involving native coronary artery of native heart with unstable angina pectoris.  Plan: Cont asa, crestor, metoprolol, enalapril  f/u HbA1c and lipid panel  TTE pending . 48 yo male with significant cardiac history admitted with near syncope likely due to cardiac etiology in setting of severely reduced systolic chf and apical thrombus       Problem/Plan - 1:  ·  Problem: Near syncope.  Plan: continue   telemetry  EKG reviewed and repeated, diffuse T wave inversions   EKG prn chest pain/palpitations  NTG prn chest pain  ASA, BB, ACE, statin continued  serial troponin x 3 negative   TTE - pending   HbA1c - 5.9 - prediabetic , diet , education , follow HgA1C in 3 months   lipid panel - noted   CT head/  carotid doppler ordered and pending   fall precautions  cardiology consulted , note noted and appreciated .  Awaiting for TTE report and further mgmt .     Problem/Plan - 2:  ·  Problem: Chronic systolic congestive heart failure.  Plan: strict i/o  orthostatic bp  not in acute failure, lungs CTA BL, no edema noted.      Problem/Plan - 3:  ·  Problem:  Apical Thrombosis.  Plan: cont xarelto  no active bleeding noted.      Problem/Plan - 4:  ·  Problem: Coronary artery disease involving native coronary artery of native heart with unstable angina pectoris.  Plan: Cont asa, crestor, metoprolol, enalapril  f/u HbA1c and lipid panel  TTE pending .    Problem / Plan -5: DVT prophylaxis - patient is on Xarelto 48 yo male with significant cardiac history admitted with near syncope likely due to cardiac etiology in setting of severely reduced systolic chf and apical thrombus       Problem/Plan - 1:  ·  Problem: Near syncope.  Plan: continue   telemetry  EKG reviewed and repeated, diffuse T wave inversions   EKG prn chest pain/palpitations  NTG prn chest pain  ASA, BB, ACE, statin continued  serial troponin x 3 negative   TTE - pending   HbA1c - 5.9 - prediabetic , diet , education , follow HgA1C in 3 months   lipid panel - noted   CT head/  carotid doppler ordered and pending   fall precautions  cardiology consulted , note noted and appreciated .  Awaiting for TTE report and further mgmt .     Problem/Plan - 2:  ·  Problem: Chronic systolic congestive heart failure.  Plan: strict i/o  orthostatic bp  not in acute failure, lungs CTA BL, no edema noted.      Problem/Plan - 3:  ·  Problem:  Apical Thrombosis.  Plan: cont xarelto  no active bleeding noted.      Problem/Plan - 4:  ·  Problem: Coronary artery disease involving native coronary artery of native heart with unstable angina pectoris.  Plan: Cont asa, crestor, metoprolol, enalapril  f/u HbA1c and lipid panel  TTE pending .    Problem / Plan -5: DVT prophylaxis - patient is on Xarelto     Problem / Plan - 6: Wenckebach over night  / HR 35-45 now - patient asymptomatic , will d/w cardio and consider to hold on BB .

## 2018-05-12 NOTE — PROGRESS NOTE ADULT - SUBJECTIVE AND OBJECTIVE BOX
[place] [facility]                                                        Office: [address] [address]                                                       Telephone: [phone]. Fax:[phone]                                                                             PROGRESS NOTE   Reason for follow up:                             Overnight: No new events.   Update:     Subjective: "  ______________________"   Complains of:   Review of symptoms: Cardiac:  No chest pain. No dyspnea. No palpitations.  Respiratory:no cough. No dyspnea  Gastrointestinal: No diarrhea. No abdominal pain. No bleeding.     Past medical history: No updates.   Chronic conditions:  Hypertension, CAD, stents, Pre-DM: Stable;    	  Vitals:  T(C): 36.7 (05-12-18 @ 09:51), Max: 37.1 (05-11-18 @ 19:11)  HR: 58 (05-12-18 @ 09:51) (58 - 81)  BP: 130/90 (05-12-18 @ 09:51) (130/90 - 160/102)  RR: 16 (05-12-18 @ 09:51) (16 - 20)  SpO2: 95% (05-12-18 @ 09:51) (94% - 99%)    I&O's Summary  11 May 2018 07:01  -  12 May 2018 07:00  --------------------------------------------------------  IN: 120 mL / OUT: 450 mL / NET: -330 mL  Weight (kg): 92.1 (05-11 @ 14:00)    PHYSICAL EXAM:  Appearance: Comfortable. No acute distress  HEENT:  Head and neck: Atraumatic. Normocephalic.  Normal oral mucosa, PERRL, Neck is supple. No JVD, No carotid bruit.   Neurologic: A & O x 3, no focal deficits. EOMI , Cranial nerves are intact.  Lymphatic: No cervical lymphadenopathy  Cardiovascular: Irregular S1 S2, No murmur, rubs/gallops. No JVD, No edema  Respiratory: Lungs clear to auscultation  Gastrointestinal:  Soft, Non-tender, + BS  Lower Extremities: No edema  Psychiatry: Patient is calm. No agitation. Mood & affect appropriate  Skin: No rashes/ ecchymoses/cyanosis/ulcers visualized on the face, hands or feet.    CURRENT MEDICATIONS:  enalapril 10 milliGRAM(s) Oral daily  hydrALAZINE Injectable 10 milliGRAM(s) IV Push once PRN  metoprolol succinate  milliGRAM(s) Oral daily  atorvastatin  aspirin enteric coated  clopidogrel Tablet  rivaroxaban      LABS:	 	  CARDIAC MARKERS ( 12 May 2018 06:01 )  x     / <0.01 ng/mL / x     / x     / x      p-BNP 12 May 2018 06:01: x    , CARDIAC MARKERS ( 11 May 2018 21:05 )  x     / <0.01 ng/mL / x     / x     / x      p-BNP 11 May 2018 21:05: x    , CARDIAC MARKERS ( 11 May 2018 15:25 )  x     / <0.01 ng/mL / x     / x     / x      p-BNP 11 May 2018 15:25: 90 pg/mL                       16.9   5.2   )-----------( 207      ( 12 May 2018 06:01 )             51.4   142  |  100  |  12.0  ----------------------------<  89  3.8   |  30.0<H>  |  1.00  Ca    9.0      12 May 2018 06:01  Phos  3.9     05-12  Mg     2.2     05-12  TPro  7.4  /  Alb  4.4  /  TBili  0.5  /  DBili  x   /  AST  20  /  ALT  18  /  AlkPhos  69  05-12  proBNP: Serum Pro-Brain Natriuretic Peptide: 90 pg/mL (05-11 @ 15:25)  Lipid Profile: Date: 05-12 @ 06:01  Total cholesterol 138; Direct LDL: 84; HDL: 44; Triglycerides:48  HgA1c: Hemoglobin A1C, Whole Blood: 5.9 %  TSH: Thyroid Stimulating Hormone, Serum: 1.35 uIU/mL    TELEMETRY: IR s1s2, possible weinkebach. [place] [facility]                                                        Office: [address] [address]                                                       Telephone: [phone]. Fax:[phone]                                                                             PROGRESS NOTE   Reason for follow up:   Syncope                            Overnight: No new events.   Update:     Subjective: "  ______________________"   Complains of:   Review of symptoms: Cardiac:  No chest pain. No dyspnea. No palpitations.  Respiratory:no cough. No dyspnea  Gastrointestinal: No diarrhea. No abdominal pain. No bleeding.     Past medical history: No updates.   Chronic conditions:  Hypertension, CAD, stents, Pre-DM: Stable;    	  Vitals:  T(C): 36.7 (05-12-18 @ 09:51), Max: 37.1 (05-11-18 @ 19:11)  HR: 58 (05-12-18 @ 09:51) (58 - 81)  BP: 130/90 (05-12-18 @ 09:51) (130/90 - 160/102)  RR: 16 (05-12-18 @ 09:51) (16 - 20)  SpO2: 95% (05-12-18 @ 09:51) (94% - 99%)    I&O's Summary  11 May 2018 07:01  -  12 May 2018 07:00  --------------------------------------------------------  IN: 120 mL / OUT: 450 mL / NET: -330 mL  Weight (kg): 92.1 (05-11 @ 14:00)    PHYSICAL EXAM:  Appearance: Comfortable. No acute distress  HEENT:  Head and neck: Atraumatic. Normocephalic.  Normal oral mucosa, PERRL, Neck is supple. No JVD, No carotid bruit.   Neurologic: A & O x 3, no focal deficits. EOMI , Cranial nerves are intact.  Lymphatic: No cervical lymphadenopathy  Cardiovascular: Irregular S1 S2, No murmur, rubs/gallops. No JVD, No edema  Respiratory: Lungs clear to auscultation  Gastrointestinal:  Soft, Non-tender, + BS  Lower Extremities: No edema  Psychiatry: Patient is calm. No agitation. Mood & affect appropriate  Skin: No rashes/ ecchymoses/cyanosis/ulcers visualized on the face, hands or feet.    CURRENT MEDICATIONS:  enalapril 10 milliGRAM(s) Oral daily  hydrALAZINE Injectable 10 milliGRAM(s) IV Push once PRN  metoprolol succinate  milliGRAM(s) Oral daily  atorvastatin  aspirin enteric coated  clopidogrel Tablet  rivaroxaban      LABS:	 	  CARDIAC MARKERS ( 12 May 2018 06:01 )  x     / <0.01 ng/mL / x     / x     / x      p-BNP 12 May 2018 06:01: x    , CARDIAC MARKERS ( 11 May 2018 21:05 )  x     / <0.01 ng/mL / x     / x     / x      p-BNP 11 May 2018 21:05: x    , CARDIAC MARKERS ( 11 May 2018 15:25 )  x     / <0.01 ng/mL / x     / x     / x      p-BNP 11 May 2018 15:25: 90 pg/mL                       16.9   5.2   )-----------( 207      ( 12 May 2018 06:01 )             51.4   142  |  100  |  12.0  ----------------------------<  89  3.8   |  30.0<H>  |  1.00  Ca    9.0      12 May 2018 06:01  Phos  3.9     05-12  Mg     2.2     05-12  TPro  7.4  /  Alb  4.4  /  TBili  0.5  /  DBili  x   /  AST  20  /  ALT  18  /  AlkPhos  69  05-12  proBNP: Serum Pro-Brain Natriuretic Peptide: 90 pg/mL (05-11 @ 15:25)  Lipid Profile: Date: 05-12 @ 06:01  Total cholesterol 138; Direct LDL: 84; HDL: 44; Triglycerides:48  HgA1c: Hemoglobin A1C, Whole Blood: 5.9 %  TSH: Thyroid Stimulating Hormone, Serum: 1.35 uIU/mL    TELEMETRY: IR s1s2, periods of  Wenckebach and 2 to 1 block with bradycardia down to mid 30's. [place] [facility]                                                        Office: [address] [address]                                                       Telephone: [phone]. Fax:[phone]                                                                             PROGRESS NOTE   Reason for follow up:   Syncope                            Overnight: No new events.   Update:   47M hx MI s/p PCI in 2006 and STEMI in 9/2017 at [facility] elevation in  V2-4 LHC at [facility]: Significant CAD disease,  had LVEF at 35%, apical thrombus, maintained on AC; who was deemed not  a candidate for life vest as pt was w/o insurance at that time.   Admitted for syncope   Patient had IR s1s2, periods of  Wenckebach, and 2 to 1 block with bradycardia down to mid 30's.       Subjective: "I want to go home"   Complains of:   Nothing    Review of symptoms: Cardiac:  No chest pain. No dyspnea. No palpitations.  Respiratory: no cough. No dyspnea  Gastrointestinal: No diarrhea. No abdominal pain. No bleeding.     Past medical history: No updates.   Chronic conditions:  Hypertension, CAD, stents, Pre-DM: Stable;    	  Vitals:  T(C): 36.7 (05-12-18 @ 09:51), Max: 37.1 (05-11-18 @ 19:11)  HR: 58 (05-12-18 @ 09:51) (58 - 81)  BP: 130/90 (05-12-18 @ 09:51) (130/90 - 160/102)  RR: 16 (05-12-18 @ 09:51) (16 - 20)  SpO2: 95% (05-12-18 @ 09:51) (94% - 99%)    I&O's Summary  11 May 2018 07:01  -  12 May 2018 07:00  --------------------------------------------------------  IN: 120 mL / OUT: 450 mL / NET: -330 mL  Weight (kg): 92.1 (05-11 @ 14:00)    PHYSICAL EXAM:  Appearance: Comfortable. No acute distress  HEENT:  Head and neck: Atraumatic. Normocephalic.  Normal oral mucosa, PERRL, Neck is supple. No JVD, No carotid bruit.   Neurologic: A & O x 3, no focal deficits. EOMI , Cranial nerves are intact.  Lymphatic: No cervical lymphadenopathy  Cardiovascular: Irregular S1 S2, No murmur, rubs/gallops. No JVD, No edema  Respiratory: Lungs clear to auscultation  Gastrointestinal:  Soft, Non-tender, + BS  Lower Extremities: No edema  Psychiatry: Patient is calm. No agitation. Mood & affect appropriate  Skin: No rashes/ ecchymoses/cyanosis/ulcers visualized on the face, hands or feet.    CURRENT MEDICATIONS:  enalapril 10 milliGRAM(s) Oral daily  hydrALAZINE Injectable 10 milliGRAM(s) IV Push once PRN  metoprolol succinate  milliGRAM(s) Oral daily  atorvastatin  aspirin enteric coated  clopidogrel Tablet  rivaroxaban      LABS:	 	  CARDIAC MARKERS ( 12 May 2018 06:01 )  x     / <0.01 ng/mL / x     / x     / x      p-BNP 12 May 2018 06:01: x    , CARDIAC MARKERS ( 11 May 2018 21:05 )  x     / <0.01 ng/mL / x     / x     / x      p-BNP 11 May 2018 21:05: x    , CARDIAC MARKERS ( 11 May 2018 15:25 )  x     / <0.01 ng/mL / x     / x     / x      p-BNP 11 May 2018 15:25: 90 pg/mL                       16.9   5.2   )-----------( 207      ( 12 May 2018 06:01 )             51.4   142  |  100  |  12.0  ----------------------------<  89  3.8   |  30.0<H>  |  1.00  Ca    9.0      12 May 2018 06:01  Phos  3.9     05-12  Mg     2.2     05-12  TPro  7.4  /  Alb  4.4  /  TBili  0.5  /  DBili  x   /  AST  20  /  ALT  18  /  AlkPhos  69  05-12  proBNP: Serum Pro-Brain Natriuretic Peptide: 90 pg/mL (05-11 @ 15:25)  Lipid Profile: Date: 05-12 @ 06:01  Total cholesterol 138; Direct LDL: 84; HDL: 44; Triglycerides:48  HgA1c: Hemoglobin A1C, Whole Blood: 5.9 %  TSH: Thyroid Stimulating Hormone, Serum: 1.35 uIU/mL    TELEMETRY: IR s1s2, periods of  Wenckebach and 2 to 1 block with bradycardia down to mid 30's.

## 2018-05-13 DIAGNOSIS — I10 ESSENTIAL (PRIMARY) HYPERTENSION: ICD-10-CM

## 2018-05-13 DIAGNOSIS — R06.83 SNORING: ICD-10-CM

## 2018-05-13 DIAGNOSIS — I44.1 ATRIOVENTRICULAR BLOCK, SECOND DEGREE: ICD-10-CM

## 2018-05-13 PROCEDURE — 99233 SBSQ HOSP IP/OBS HIGH 50: CPT

## 2018-05-13 RX ORDER — LISINOPRIL 2.5 MG/1
10 TABLET ORAL ONCE
Qty: 0 | Refills: 0 | Status: DISCONTINUED | OUTPATIENT
Start: 2018-05-13 | End: 2018-05-13

## 2018-05-13 RX ORDER — RIVAROXABAN 15 MG-20MG
20 KIT ORAL EVERY 24 HOURS
Qty: 0 | Refills: 0 | Status: DISCONTINUED | OUTPATIENT
Start: 2018-05-13 | End: 2018-05-14

## 2018-05-13 RX ADMIN — Medication 10 MILLIGRAM(S): at 21:59

## 2018-05-13 RX ADMIN — ROSUVASTATIN CALCIUM 20 MILLIGRAM(S): 5 TABLET ORAL at 21:57

## 2018-05-13 RX ADMIN — CLOPIDOGREL BISULFATE 75 MILLIGRAM(S): 75 TABLET, FILM COATED ORAL at 12:06

## 2018-05-13 RX ADMIN — Medication 10 MILLIGRAM(S): at 08:32

## 2018-05-13 RX ADMIN — Medication 81 MILLIGRAM(S): at 12:05

## 2018-05-13 RX ADMIN — Medication 10 MILLIGRAM(S): at 17:25

## 2018-05-13 RX ADMIN — RIVAROXABAN 15 MILLIGRAM(S): KIT at 08:32

## 2018-05-13 RX ADMIN — Medication 10 MILLIGRAM(S): at 05:13

## 2018-05-13 NOTE — CONSULT NOTE ADULT - SUBJECTIVE AND OBJECTIVE BOX
PULMONARY CONSULT NOTE      RICK LATHAMN-[id number]    Patient is a 47y old  Male who presents with a chief complaint of almost passed out (11 May 2018 20:04)      HISTORY OF PRESENT ILLNESS: 47M hx MI s/p PCI in 2006 and STEMI in 9/2017 at [facility] elevation in  V2-4 LHC at [facility]: 70-80% mRCA in-stent restenosis and diffuse dLAD in stent restenosis noted on cath; mRCA stented with PEPE,  had LVEF at 35%, apical thrombus, maintained on AC; who was  deemed not  a candidate for life vest as pt was w/o insurance at that time. He lives in [facility] and [place] this Monday. He had an episode of lightheadedness, nearly falling down associated with palpitation this am thus came to ER. He stated he had similar episode while he was in [facility]. Denied dyspnea, orthopnea PND, edema, CP, nausea, vomiting. hematuria, melena.  Cardiac monitor with episodes of weneckeback, asymptomatic. Pt with snoring, witnessed apneas, choking/gasping.      MEDICATIONS  (STANDING):  aspirin enteric coated 81 milliGRAM(s) Oral daily  clopidogrel Tablet 75 milliGRAM(s) Oral daily  enalapril 10 milliGRAM(s) Oral every 12 hours  rivaroxaban 20 milliGRAM(s) Oral every 24 hours  rosuvastatin 20 milliGRAM(s) Oral at bedtime      MEDICATIONS  (PRN):  hydrALAZINE Injectable 10 milliGRAM(s) IV Push once PRN sbp >170      Allergies    No Known Allergies    Intolerances        PAST MEDICAL & SURGICAL HISTORY:  CAD (coronary artery disease)  Stented coronary artery  Thrombosis: apical  MI (myocardial infarction): 2 coronary stents  HTN (hypertension)  No significant past surgical history      FAMILY HISTORY:  Family history of MI (myocardial infarction) (Father)      SOCIAL HISTORY  Smoking History: former smoker; quit one year ago    REVIEW OF SYSTEMS:    CONSTITUTIONAL:  No fevers, chills, sweats    HEENT:  Eyes:  No diplopia or blurred vision. ENT:  No earache, sore throat or runny nose.    CARDIOVASCULAR: per HPI    RESPIRATORY: per HPI      GASTROINTESTINAL:  No abdominal pain, nausea, vomiting or diarrhea.    GENITOURINARY:  No dysuria, frequency or urgency.    NEUROLOGIC:  No paresthesias, fasciculations, seizures or weakness.    PSYCHIATRIC:  No disorder of thought or mood.    Vital Signs Last 24 Hrs  T(C): 37.2 (13 May 2018 08:42), Max: 37.2 (13 May 2018 05:10)  T(F): 99 (13 May 2018 08:42), Max: 99 (13 May 2018 05:10)  HR: 77 (13 May 2018 09:46) (39 - 77)  BP: 146/99 (13 May 2018 09:46) (146/99 - 164/100)  BP(mean): --  RR: 16 (13 May 2018 09:46) (16 - 18)  SpO2: 100% (13 May 2018 09:46) (94% - 100%)    PHYSICAL EXAMINATION:    GENERAL: The patient is a well-developed,  in no apparent distress.     HEENT: Head is normocephalic and atraumatic.  Crowded oropharynx    NECK: Supple. >17 in    LUNGS: Clear to auscultation without wheezing, rales, or rhonchi. Respirations unlabored    HEART: Regular rate and rhythm      ABDOMEN: Soft, nontender, and nondistended. obese    EXTREMITIES: Without any cyanosis, clubbing, rash, lesions or edema.    NEUROLOGIC: Grossly intact.      LABS:                        16.9   5.2   )-----------( 207      ( 12 May 2018 06:01 )             51.4     05-12    142  |  100  |  12.0  ----------------------------<  89  3.8   |  30.0<H>  |  1.00    Ca    9.0      12 May 2018 06:01  Phos  3.9     05-12  Mg     2.2     05-12    TPro  7.4  /  Alb  4.4  /  TBili  0.5  /  DBili  x   /  AST  20  /  ALT  18  /  AlkPhos  69  05-12    PT/INR - ( 12 May 2018 06:01 )   PT: 12.3 sec;   INR: 1.12 ratio         PTT - ( 12 May 2018 06:01 )  PTT:38.5 sec       CARDIAC MARKERS ( 12 May 2018 06:01 )  x     / <0.01 ng/mL / x     / x     / x      CARDIAC MARKERS ( 11 May 2018 21:05 )  x     / <0.01 ng/mL / x     / x     / x      CARDIAC MARKERS ( 11 May 2018 15:25 )  x     / <0.01 ng/mL / x     / x     / x            Serum Pro-Brain Natriuretic Peptide: 90 pg/mL (05-11-18 @ 15:25)               RADIOLOGY & ADDITIONAL STUDIES:  < from: Xray Chest 1 View AP/PA. (05.11.18 @ 15:12) >     EXAM:  XR CHEST AP OR PA 1V                          PROCEDURE DATE:  05/11/2018          INTERPRETATION:  CHEST AP PORTABLE:    History: Chest Pain.     Date and time of exam: 5/11/2018 2:54 PM.    Technique: A single AP view of the chest was obtained.    Comparison exam: No prior exam.    Findings:  The lungs are clear. The heart and mediastinum are unremarkable. No hilar   abnormality. No evidence of a pleural effusion. No osseous abnormality..    Impression:  Unremarkable exam..                DAMIAN THAYER M.D., ATTENDING RADIOLOGIST    < end of copied text >

## 2018-05-13 NOTE — CONSULT NOTE ADULT - ASSESSMENT
-PAMELA  -Hx CAD, CHF  -HTN  -Arrythmia as above; episodes while sleeping    RECC   Will need PSG as outpt to get CPAP at home. Can start empirically while here in the hospital and assess telemetry and pulse ox. Weight loss. All questions answered.
47M hx MI s/p PCI in 2006 and STEMI in 9/2017 at [facility] elevation in  V2-4 LHC at [facility]: 70-80% mRCA in-stent restenosis and diffuse dLAD in stent restenosis noted on cath; mRCA tstented with PEPE,  had LVEF at 35%, apical thrombus, maintained on AC; who was  deemed not  a candidate for life vest as pt was w/o insurance at that time. He lives in [facility] and [place] this Monday. He had an episode of lightheadedness, nearly falling down associated with palpitation this am thus came to ER. He stated he had similar episode while he was in [facility]. Denied dyspnea, orthopnea PND, edema, CP, nausea, vomiting. hematuria, melena.    A/  Near Syncope, Lightheadedness, Dizziness  Palpitation  Ischemic CM  Chronic Systolic HF, HFrEF  LV apical thrombus  CAD: h/o MI, sp PCI  HTN    P/  Telemetry Monitoring  Echo  Serial CE  BNP  Monitor BP  Cont:   enalapril 10 mg daily  Plavix 75 mg daily  Xarelto 20 mg daily   metoprolol succinate 100 mg  daily  Crestor 20 mg daily

## 2018-05-13 NOTE — PROGRESS NOTE ADULT - ASSESSMENT
46 yo male with significant cardiac history admitted with near syncope likely due to cardiac etiology in setting of severely reduced systolic chf and apical thrombus       Problem/Plan - 1:  ·  Problem: Near syncope.  Plan: continue   telemetry  EKG reviewed and repeated, diffuse T wave inversions   EKG prn chest pain/palpitations  NTG prn chest pain  ASA, BB, ACE, statin continued  serial troponin x 3 negative   TTE - pending   HbA1c - 5.9 - prediabetic , diet , education , follow HgA1C in 3 months   lipid panel - noted   CT head/  carotid doppler ordered and pending   fall precautions  cardiology consulted , note noted and appreciated .  Awaiting for TTE report and further mgmt .     Problem/Plan - 2:  ·  Problem: Chronic systolic congestive heart failure.  Plan: strict i/o  orthostatic bp  not in acute failure, lungs CTA BL, no edema noted.      Problem/Plan - 3:  ·  Problem:  Apical Thrombosis.  Plan: cont xarelto  no active bleeding noted.      Problem/Plan - 4:  ·  Problem: Coronary artery disease involving native coronary artery of native heart with unstable angina pectoris.  Plan: Cont asa, crestor, metoprolol, enalapril  f/u HbA1c and lipid panel  TTE pending .    Problem / Plan -5: DVT prophylaxis - patient is on Xarelto     Problem / Plan - 6: Wenckebach over night  / HR 35-45 now - patient asymptomatic , will d/w cardio and consider to hold on BB . 46 yo male with significant cardiac history admitted with near syncope likely due to cardiac etiology in setting of severely reduced systolic chf and apical thrombus       Problem/Plan - 1:  ·  Problem: Near syncope.  Plan: continue   telemetry  EKG reviewed and repeated, diffuse T wave inversions   EKG prn chest pain/palpitations  NTG prn chest pain  ASA, BB, ACE, statin continued  serial troponin x 3 negative   TTE - noted EF 35-45%  HbA1c - 5.9 - prediabetic , diet , education , follow HgA1C in 3 months   lipid panel - noted   CT head/  carotid doppler noted , no acyte pathology   fall precautions  cardiology consulted , note noted and appreciated .      Problem/Plan - 2:  ·  Problem: Chronic systolic congestive heart failure.  Plan: strict i/o  orthostatic bp  not in acute failure, lungs CTA BL, no edema noted.      Problem/Plan - 3:  ·  Problem:  Apical Thrombosis.  Plan: cont xarelto  no active bleeding noted.      Problem/Plan - 4:  ·  Problem: Coronary artery disease involving native coronary artery of native heart with unstable angina pectoris.  Plan: Cont asa, crestor, metoprolol, enalapri    Problem / Plan -5: DVT prophylaxis - patient is on Xarelto     Problem / Plan - 6: Wenckebach over night  - EP study as per cardio , hold BB     Problem / Plan - 7: HgA1C - 5.9 - Prediabetes - diet , life style changes , follow up with PMD in 3 months .

## 2018-05-13 NOTE — PROGRESS NOTE ADULT - PROBLEM SELECTOR PLAN 5
sleep apnea evaluation sleep apnea evaluation  o2 pulse ox on ra throughout the night  Call placed to Dr. Regan service

## 2018-05-13 NOTE — PROGRESS NOTE ADULT - ASSESSMENT
46y/o male with PMH of hypertension, Hyperlipidemia, CAD, stent 2006, stemi 9/2017 RCA and LAD restenosis with a mid RCA PEPE placed,  Ef 35% with apical thrombus admitted for near syncope.   [facility] a [facility]   work up thus far, Telemetry monitoring episodes of wenchebach, carotids negative and echo with EF 40% 48y/o male with PMH of hypertension, Hyperlipidemia, CAD, stent 2006, stemi 9/2017 RCA and LAD restenosis with a mid RCA PEPE placed,  Ef 35% with apical thrombus admitted for near syncope.   Had a previous syncopal event while [place]   work up thus far, Telemetry monitoring episodes of wenchebach, carotids negative and echo with EF 40%    Pt admits snoring Pulmonary evaluation to evaluate for PAMELA.    EP Evaluation / Matt notified.    Will obtain NST results from [facility].

## 2018-05-13 NOTE — PROGRESS NOTE ADULT - SUBJECTIVE AND OBJECTIVE BOX
Patient seen and examined .     CC : lightheadedness     HPI:  Pt is a 48 yo male with a pmh/o CAD s/p PCI in  at [facility] and MI in  at [facility] with recanalization of RCA and LAD restenosis with a mid RCA PEPE placement, found to have EF 35% and apical thrombus however was not deemed candidate for life vest as pt w/o insurance at time and lives between [facility] and . Pt today presents to ED after experiencing episode of dizziness, palpitations and feeling as if he were going to faint. Pt states he had an episode where he 'passed out' and lost consciousness in [facility] while working out but did not undergo evaluation at that time. Pt denies any cp, diaphoresis, n/v/d, gu sx, gi sx, fever, cough, leg swelling. Pt states compliance with med regimen asa, plavix, xarelto. (11 May 2018 20:04)      PAST MEDICAL & SURGICAL HISTORY:  CAD (coronary artery disease)  Stented coronary artery  Thrombosis: apical  MI (myocardial infarction): 2 coronary stents  HTN (hypertension)  No significant past surgical history      MEDICATIONS  (STANDING):  aspirin enteric coated 81 milliGRAM(s) Oral daily  clopidogrel Tablet 75 milliGRAM(s) Oral daily  enalapril 10 milliGRAM(s) Oral every 12 hours  rivaroxaban 20 milliGRAM(s) Oral every 24 hours  rosuvastatin 20 milliGRAM(s) Oral at bedtime    MEDICATIONS  (PRN):  hydrALAZINE Injectable 10 milliGRAM(s) IV Push once PRN sbp >170      LABS:                          16.9   5.2   )-----------( 207      ( 12 May 2018 06:01 )             51.4     05-12    142  |  100  |  12.0  ----------------------------<  89  3.8   |  30.0<H>  |  1.00    Ca    9.0      12 May 2018 06:01  Phos  3.9     05-12  Mg     2.2     05-12    TPro  7.4  /  Alb  4.4  /  TBili  0.5  /  DBili  x   /  AST  20  /  ALT  18  /  AlkPhos  69  05-12    PT/INR - ( 12 May 2018 06:01 )   PT: 12.3 sec;   INR: 1.12 ratio         PTT - ( 12 May 2018 06:01 )  PTT:38.5 sec  Urinalysis Basic - ( 12 May 2018 12:11 )    Color: Yellow / Appearance: Clear / S.015 / pH: x  Gluc: x / Ketone: Negative  / Bili: Negative / Urobili: Negative mg/dL   Blood: x / Protein: Negative mg/dL / Nitrite: Negative   Leuk Esterase: Negative / RBC: x / WBC x   Sq Epi: x / Non Sq Epi: x / Bacteria: x        RADIOLOGY & ADDITIONAL TESTS:      REVIEW OF SYSTEMS:    CONSTITUTIONAL: No fever, weight loss, or fatigue  EYES: No eye pain, visual disturbances, or discharge  ENMT:  No difficulty hearing, tinnitus, vertigo; No sinus or throat pain  NECK: No pain or stiffness  RESPIRATORY: No cough, wheezing, chills or hemoptysis; No shortness of breath  CARDIOVASCULAR: No chest pain, palpitations, dizziness, or leg swelling  GASTROINTESTINAL: No abdominal or epigastric pain. No nausea, vomiting, or hematemesis; No diarrhea or constipation. No melena or hematochezia.  GENITOURINARY: No dysuria, frequency, hematuria, or incontinence  NEUROLOGICAL: No headaches, memory loss, loss of strength, numbness, or tremors  SKIN: No itching, burning, rashes, or lesions   LYMPH NODES: No enlarged glands  ENDOCRINE: No heat or cold intolerance; No hair loss  MUSCULOSKELETAL:   PSYCHIATRIC: No depression, anxiety, mood swings, or difficulty sleeping  HEME/LYMPH: No easy bruising, or bleeding gums  ALLERGY AND IMMUNOLOGIC: No hives or eczema    Vital Signs Last 24 Hrs  T(C): 37.2 (13 May 2018 08:42), Max: 37.2 (13 May 2018 05:10)  T(F): 99 (13 May 2018 08:42), Max: 99 (13 May 2018 05:10)  HR: 77 (13 May 2018 09:46) (39 - 77)  BP: 146/99 (13 May 2018 09:46) (146/99 - 164/100)  BP(mean): --  RR: 16 (13 May 2018 09:46) (16 - 18)  SpO2: 100% (13 May 2018 09:46) (94% - 100%)  PHYSICAL EXAM:    GENERAL: NAD, well-groomed, well-developed  HEAD:  Atraumatic, Normocephalic  EYES: EOMI, PERRLA, conjunctiva and sclera clear  NECK: Supple, No JVD, Normal thyroid  NERVOUS SYSTEM:  Alert & Oriented X3, no focal deficit  CHEST/LUNG: CTA b/l ,  no  rales, rhonchi, wheezing, or rubs  HEART: Regular rate and rhythm; No murmurs, rubs, or gallops  ABDOMEN: Soft, Nontender, Nondistended; Bowel sounds present  EXTREMITIES:  2+ Peripheral Pulses, No clubbing, cyanosis, or edema  LYMPH: No lymphadenopathy noted  SKIN: No rashes or lesions Patient seen and examined . Comfortable , wife at bed side , state had episode of mid sternal chest pain lasting 1min this am - resolved , had episode of lightheadedness while ambulating this am , now resolved     CC : lightheadedness , chest pain      Overnight: No new events.  Cardiac monitor with episodes of weneckeback, asymptomatic  Patient has been hypertensive, vasotec increased  B/p now 130/80    HPI:  Pt is a 46 yo male with a pmh/o CAD s/p PCI in  at [facility] and MI in  at [facility] with recanalization of RCA and LAD restenosis with a mid RCA PEPE placement, found to have EF 35% and apical thrombus however was not deemed candidate for life vest as pt w/o insurance at time and lives between [facility] and . Pt today presents to ED after experiencing episode of dizziness, palpitations and feeling as if he were going to faint. Pt states he had an episode where he 'passed out' and lost consciousness in [facility] while working out but did not undergo evaluation at that time. Pt denies any cp, diaphoresis, n/v/d, gu sx, gi sx, fever, cough, leg swelling. Pt states compliance with med regimen asa, plavix, xarelto. (11 May 2018 20:04)      PAST MEDICAL & SURGICAL HISTORY:  CAD (coronary artery disease)  Stented coronary artery  Thrombosis: apical  MI (myocardial infarction): 2 coronary stents  HTN (hypertension)  No significant past surgical history      MEDICATIONS  (STANDING):  aspirin enteric coated 81 milliGRAM(s) Oral daily  clopidogrel Tablet 75 milliGRAM(s) Oral daily  enalapril 10 milliGRAM(s) Oral every 12 hours  rivaroxaban 20 milliGRAM(s) Oral every 24 hours  rosuvastatin 20 milliGRAM(s) Oral at bedtime    MEDICATIONS  (PRN):  hydrALAZINE Injectable 10 milliGRAM(s) IV Push once PRN sbp >170      LABS:                          16.9   5.2   )-----------( 207      ( 12 May 2018 06:01 )             51.4     05-12    142  |  100  |  12.0  ----------------------------<  89  3.8   |  30.0<H>  |  1.00    Ca    9.0      12 May 2018 06:01  Phos  3.9     -  Mg     2.2     -12    TPro  7.4  /  Alb  4.4  /  TBili  0.5  /  DBili  x   /  AST  20  /  ALT  18  /  AlkPhos  69  05-12    PT/INR - ( 12 May 2018 06:01 )   PT: 12.3 sec;   INR: 1.12 ratio         PTT - ( 12 May 2018 06:01 )  PTT:38.5 sec  Urinalysis Basic - ( 12 May 2018 12:11 )    Color: Yellow / Appearance: Clear / S.015 / pH: x  Gluc: x / Ketone: Negative  / Bili: Negative / Urobili: Negative mg/dL   Blood: x / Protein: Negative mg/dL / Nitrite: Negative   Leuk Esterase: Negative / RBC: x / WBC x   Sq Epi: x / Non Sq Epi: x / Bacteria: x        RADIOLOGY & ADDITIONAL TESTS:      REVIEW OF SYST< from: TTE Echo Complete w/Doppler (18 @ 10:54) >    EXAM:  ECHO TRANSTHORACIC COMP W DOPP      PROCEDURE DATE:  May 12 2018   .      INTERPRETATION:  REPORT:    TRANSTHORACIC ECHOCARDIOGRAM REPORT         Patient Name:   RICK LATHAM Patient Location: Emergency  Medical Rec #:  [id number]      Accession #:      [id number]  Account #:                    Height:           70.1 in 178.0 cm  YOB: 1970      Weight:           202.8 lb 92.00 kg  Patient Age:    47 years      BSA:              2.10 m²  Patient Gender: M             BP:        149/89 mmHg       Date of Exam:        2018 10:54:30 AM  Sonographer:         Jimenez Miller Jr  Referring Physician: Valery Perez MD    Procedure:   2D Echo/Doppler/Color Doppler Complete.  Indications: Syncope and collapse - R55  Diagnosis:   Dilated cardiomyopathy - I42.0         2D AND M-MODE MEASUREMENTS (normal ranges within parentheses):  Left                Normal    Aorta/Left           Normal  Ventricle:                    Atrium:  IVSd (2D):    1.70  (0.7-1.1) Aortic Root  2.77 cm (2.4-3.7)                cm              (2D):  LVPWd (2D):   1.22  (0.7-1.1) Left Atrium  3.54 cm (1.9-4.0)                cm              (2D):  LVIDd (2D):   5.07  (3.4-5.7) LA Volume    22.5                cm              Index ml/m²  LVIDs (2D):   4.11            Right Ventricle:                cm              TAPSE:           1.97 cm  LV FS (2D):   19.0  (>25%)                %  Relative Wall 0.48  (<0.42)  Thickness    LV SYSTOLIC FUNCTION BY 2D PLANIMETRY (MOD):  EF-Biplane: 45 %    LV DIASTOLIC FUNCTION:  MV Peak E: 0.64 m/s E/e' Ratio: 11.70  MV Peak A: 0.55 m/s Decel Time: 169 msec  E/A Ratio: 1.16    SPECTRAL DOPPLER ANALYSIS (where applicable):  Mitral Valve:  MV P1/2 Time: 49.01 msec  MV Area, PHT: 4.49 cm²    LVOT Vmax:  LVOT VTI:  LVOT Diameter: 2.27 cm    Tricuspid Valve and PA/RV Systolic Pressure: TR Max Velocity: 1.11 m/s RA   Pressure: 3 mmHg RVSP/PASP: 8.0 mmHg       PHYSICIAN INTERPRETATION:  Left Ventricle: The left ventricular internal cavity size is mild to   moderately increased. Left ventricular wall thickness is increased. There   is severe septal left ventricular hypertrophy involving the septal and   posterior wall. The LVH involves septal and posterior walls.  Global LV systolic function was mildly to moderately decreased. Left   ventricular ejection fraction, by visual estimation, is 35 to 40%.       LV Wall Scoring:  The apical septal segment, apical lateral segment, and apical anterior   segment  are akinetic. The mid anteroseptal segment and apical inferior segment are  hypokinetic.    Right Ventricle: Normal right ventricular size and function. TV S' 0.1   m/s.  Left Atrium: The left atrium is normal in size.  Right Atrium: The right atrium is normal in size.  Pericardium: There is no evidence of pericardial effusion.  Mitral Valve: Structurally normal mitral valve, with normal leaflet   excursion. Thickening of the anterior and posterior mitral valve   leaflets. No evidence of mitral valve regurgitation is seen.  Tricuspid Valve: Structurally normal tricuspid valve, with normal leaflet   excursion. Trivial tricuspid regurgitation is visualized.  Aortic Valve: Normal trileaflet aortic valve with normal opening. No   evidence of aortic valve regurgitation is seen.  Pulmonic Valve: Structurally normal pulmonic valve, with normal leaflet   excursion. No indication of pulmonic valve regurgitation.  Aorta: The aortic root is normal in size and structure.  Pulmonary Artery: The main pulmonary artery is normal in size.  Venous: The inferior vena cava was normal sized, with respiratory size   variation greater than 50%.       Summary:   1. Left ventricular ejection fraction, by visual estimation, is 35 to   40%.   2. Mildly to moderately decreased global left ventricular systolic   function.   3. Multiple left ventricular regional wall motion abnormalities exist.   See wall motion findings.   4. Increased LV wall thickness.   5. There is severe septal left ventricular hypertrophy.   6. Thickening of the anterior and posterior mitral valve leaflets.    [id number] Negro Fox MD, Electronically signed on 2018 at 12:38:30 PM              *** Final ***          NEGRO FOX M.D., ATTENDING RADIOLOGIST  This document has been electronically signed. May 12 2018 10:54AM    < end of copied text >        < from: CT Head No Cont (18 @ 12:11) >     EXAM:  CT BRAIN                          PROCEDURE DATE:  2018          INTERPRETATION:  CLINICAL INFORMATION: Near syncope.    TECHNIQUE: Multiple axial CT images of the calvarium and brain were   obtained without contrast.     COMPARISON: No prior studies are available for comparison at this   institution.    FINDINGS:     There is no mass effect, intracranial hemorrhage, or midline shift.    The ventricles and sulci are appropriate in size and configuration for   patient's stated age.    There is focal cortical loss in the medial right parietal region.    The imaged portions of the paranasal sinuses and mastoids are well   aerated.     There is no osseous abnormality.    IMPRESSION:    No intracranial hemorrhage, mass effect, or midline shift.    Focal cortical loss in the right medial parietal region. This may be the   result of prior insult in this region or developmental in etiology.   Further evaluation with MRI of the brain is recommended.      DEA RUIZ M.D.,ATTENDING RADIOLOGIST  This document has been electronically signed. May 12 2018 12:37PM        < end of copied text >    < from: US Duplex Carotid Arteries Complete, Bilateral (18 @ 11:54) >     EXAM:  US DPLX CAROTIDS COMPL BI                          PROCEDURE DATE:  2018          INTERPRETATION:  History: Dizziness.    Findings: There is no plaque present in either carotid system. No   disturbed color-flow or elevated blood flow velocities are encountered.   The distal right internal carotid artery is tortuous. Antegrade flow is   present in both vertebral arteries.      Blood flow velocities are as follows:    RIGHT:    PROX CCA = 90 ;  DIST CCA = 86 ;  PROX ICA = 68 ;  DISTICA =   50 ;  ECA = 95    LEFT   :    PROX CCA = 89 ;  DIST CCA = 89 ;  PROX ICA = C4 ;  DIST ICA =   C6 ;  ECA = 71    Impression:. Normal carotid duplex without evidence of hemodynamically   significant stenosis.                 DEA RUIZ M.D., ATTENDING RADIOLOGIST  This document has been electronically signed. May 12 2018 11:58AM    < end of copied text >        CONSTITUTIONAL: No fever, weight loss, or fatigue  EYES: No eye pain, visual disturbances, or discharge  ENMT:  No difficulty hearing, tinnitus, vertigo; No sinus or throat pain  NECK: No pain or stiffness  RESPIRATORY: No cough, wheezing, chills or hemoptysis; No shortness of breath  CARDIOVASCULAR: No chest pain, palpitations, dizziness, or leg swelling  GASTROINTESTINAL: No abdominal or epigastric pain. No nausea, vomiting, or hematemesis; No diarrhea or constipation. No melena or hematochezia.  GENITOURINARY: No dysuria, frequency, hematuria, or incontinence  NEUROLOGICAL: No headaches, memory loss, loss of strength, numbness, or tremors  SKIN: No itching, burning, rashes, or lesions   LYMPH NODES: No enlarged glands  ENDOCRINE: No heat or cold intolerance; No hair loss  MUSCULOSKELETAL:   PSYCHIATRIC: No depression, anxiety, mood swings, or difficulty sleeping  HEME/LYMPH: No easy bruising, or bleeding gums  ALLERGY AND IMMUNOLOGIC: No hives or eczema    Vital Signs Last 24 Hrs  T(C): 37.2 (13 May 2018 08:42), Max: 37.2 (13 May 2018 05:10)  T(F): 99 (13 May 2018 08:42), Max: 99 (13 May 2018 05:10)  HR: 77 (13 May 2018 09:46) (39 - 77)  BP: 146/99 (13 May 2018 09:46) (146/99 - 164/100)  BP(mean): --  RR: 16 (13 May 2018 09:46) (16 - 18)  SpO2: 100% (13 May 2018 09:46) (94% - 100%)  PHYSICAL EXAM:    GENERAL: NAD, well-groomed, well-developed  HEAD:  Atraumatic, Normocephalic  EYES: EOMI, PERRLA, conjunctiva and sclera clear  NECK: Supple, No JVD, Normal thyroid  NERVOUS SYSTEM:  Alert & Oriented X3, no focal deficit  CHEST/LUNG: CTA b/l ,  no  rales, rhonchi, wheezing, or rubs  HEART: Regular rate and rhythm; No murmurs, rubs, or gallops  ABDOMEN: Soft, Nontender, Nondistended; Bowel sounds present  EXTREMITIES:  2+ Peripheral Pulses, No clubbing, cyanosis, or edema  LYMPH: No lymphadenopathy noted  SKIN: No rashes or lesions

## 2018-05-13 NOTE — PROGRESS NOTE ADULT - SUBJECTIVE AND OBJECTIVE BOX
[place] [facility]                                                        Office: [address] [address]                                                       Telephone: [phone]. Fax:[phone]                                                                             PROGRESS NOTE   Reason for follow up: Follow up on syncope   Overnight: No new events.  Cardiac monitor with episodes of weneckeback, asymptomatic  Patient has been hypertensive, vasotec increased  B/p now 130/80    ROS:  Review of symptoms:  Cardiac:  No chest pain. No dyspnea. No palpitations.  Respiratory:no cough. No dyspnea  Gastrointestinal: No diarrhea. No abdominal pain. No bleeding.       	  Vitals:  T(C): 37.2 (05-13-18 @ 08:42), Max: 37.2 (05-13-18 @ 05:10)  HR: 77 (05-13-18 @ 09:46) (39 - 77)  BP: 146/99 (05-13-18 @ 09:46) (146/99 - 164/100)  RR: 16 (05-13-18 @ 09:46) (16 - 18)  SpO2: 100% (05-13-18 @ 09:46) (94% - 100%)  Wt(kg): --  I&O's Summary    12 May 2018 07:01  -  13 May 2018 07:00  --------------------------------------------------------  IN: 480 mL / OUT: 950 mL / NET: -470 mL      Weight (kg): 92.1 (05-11 @ 14:00)    PHYSICAL EXAM:  Appearance: Comfortable. No acute distress  HEENT: No bruiit  Lymphatic: No cervical lymphadenopathy  Cardiovascular: Normal S1 S2, No murmur, rubs/gallops. No JVD, No edema  Respiratory: Lungs clear to auscultation  Gastrointestinal:  Soft, Non-tender, + BS  Lower Extremities: No edema  Psychiatry: Patient is calm. No agitation. Mood & affect appropriate  Neurologic: A & O x 3 per intepreter    CURRENT MEDICATIONS:    aspirin enteric coated 81mg qd  clopidogrel Tablet 75mg qd  rivaroxaban 15 bid  rosuvastatin 20mg qd  enalapril 10 milliGRAM(s) Oral every 12 hours  hydrALAZINE Injectable 10 milliGRAM(s) IV Push once PRN  metoprolol tartrate 25 milliGRAM(s) Oral two times a day              LABS:	 	  CARDIAC MARKERS ( 12 May 2018 06:01 )  x     / <0.01 ng/mL / x     / x     / x      p-BNP 12 May 2018 06:01: x    , CARDIAC MARKERS ( 11 May 2018 21:05 )  x     / <0.01 ng/mL / x     / x     / x      p-BNP 11 May 2018 21:05: x    , CARDIAC MARKERS ( 11 May 2018 15:25 )  x     / <0.01 ng/mL / x     / x     / x      p-BNP 11 May 2018 15:25: 90 pg/mL                            16.9   5.2   )-----------( 207      ( 12 May 2018 06:01 )             51.4     05-12    142  |  100  |  12.0  ----------------------------<  89  3.8   |  30.0<H>  |  1.00    Ca    9.0      12 May 2018 06:01  Phos  3.9     05-12  Mg     2.2     05-12    TPro  7.4  /  Alb  4.4  /  TBili  0.5  /  DBili  x   /  AST  20  /  ALT  18  /  AlkPhos  69  05-12    proBNP: Serum Pro-Brain Natriuretic Peptide: 90 pg/mL (05-11 @ 15:25)    Lipid Profile: Date: 05-12 @ 06:01  Total cholesterol 138; Direct LDL: 84; HDL: 44; Triglycerides:48    HgA1c: Hemoglobin A1C, Whole Blood: 5.9 %  TSH: Thyroid Stimulating Hormone, Serum: 1.35 uIU/mL      TELEMETRY: Reviewed    ECG:  Reviewed by me. 	    DIAGNOSTIC TESTING:  [ ] Echocardiogram: . Left ventricular ejection fraction, by visual estimation, is 35 to   40%.   2. Mildly to moderately decreased global left ventricular systolic   function.   3. Multiple left ventricular regional wall motion abnormalities exist.   See wall motion findings.   4. Increased LV wall thickness.   5. There is severe septal left ventricular hypertrophy.   6. Thickening of the anterior and posterior mitral valve leaflets. [place] [facility]                                                        Office: [address] [address]                                                       Telephone: [phone]. Fax:[phone]                                                                             PROGRESS NOTE   Reason for follow up: Follow up on syncope   Overnight: No new events.  Cardiac monitor with episodes of weneckeback, asymptomatic  Patient has been hypertensive, vasotec increased  B/p now 130/80    ROS:  Review of symptoms:  Cardiac:  No chest pain. No dyspnea. No palpitations.  Respiratory:no cough. No dyspnea  Gastrointestinal: No diarrhea. No abdominal pain. No bleeding.       	  Vitals:  T(C): 37.2 (05-13-18 @ 08:42), Max: 37.2 (05-13-18 @ 05:10)  HR: 77 (05-13-18 @ 09:46) (39 - 77)  BP: 146/99 (05-13-18 @ 09:46) (146/99 - 164/100)  RR: 16 (05-13-18 @ 09:46) (16 - 18)  SpO2: 100% (05-13-18 @ 09:46) (94% - 100%)  Wt(kg): --  I&O's Summary    12 May 2018 07:01  -  13 May 2018 07:00  --------------------------------------------------------  IN: 480 mL / OUT: 950 mL / NET: -470 mL      Weight (kg): 92.1 (05-11 @ 14:00)    PHYSICAL EXAM:  Appearance: Comfortable. No acute distress  HEENT: No bruiit  Lymphatic: No cervical lymphadenopathy  Cardiovascular: Normal S1 S2, No murmur, rubs/gallops. No JVD, No edema  Respiratory: Lungs clear to auscultation  Gastrointestinal:  Soft, Non-tender, + BS  Lower Extremities: No edema  Psychiatry: Patient is calm. No agitation. Mood & affect appropriate  Neurologic: A & O x 3 per intepreter    CURRENT MEDICATIONS:    aspirin enteric coated 81mg qd  clopidogrel Tablet 75mg qd  rivaroxaban 15 bid  rosuvastatin 20mg qd  enalapril 10 milliGRAM(s) Oral every 12 hours  hydrALAZINE Injectable 10 milliGRAM(s) IV Push once PRN  metoprolol tartrate 25 milliGRAM(s) Oral two times a day              LABS:	 	  CARDIAC MARKERS ( 12 May 2018 06:01 )  x     / <0.01 ng/mL / x     / x     / x      p-BNP 12 May 2018 06:01: x    , CARDIAC MARKERS ( 11 May 2018 21:05 )  x     / <0.01 ng/mL / x     / x     / x      p-BNP 11 May 2018 21:05: x    , CARDIAC MARKERS ( 11 May 2018 15:25 )  x     / <0.01 ng/mL / x     / x     / x      p-BNP 11 May 2018 15:25: 90 pg/mL                            16.9   5.2   )-----------( 207      ( 12 May 2018 06:01 )             51.4     05-12    142  |  100  |  12.0  ----------------------------<  89  3.8   |  30.0<H>  |  1.00    Ca    9.0      12 May 2018 06:01  Phos  3.9     05-12  Mg     2.2     05-12    TPro  7.4  /  Alb  4.4  /  TBili  0.5  /  DBili  x   /  AST  20  /  ALT  18  /  AlkPhos  69  05-12    proBNP: Serum Pro-Brain Natriuretic Peptide: 90 pg/mL (05-11 @ 15:25)    Lipid Profile: Date: 05-12 @ 06:01  Total cholesterol 138; Direct LDL: 84; HDL: 44; Triglycerides:48    HgA1c: Hemoglobin A1C, Whole Blood: 5.9 %  TSH: Thyroid Stimulating Hormone, Serum: 1.35 uIU/mL      TELEMETRY: Reviewed    ECG:  Reviewed by me. 	  Carotids no significant disease  DIAGNOSTIC TESTING:  [ ] Echocardiogram: . Left ventricular ejection fraction, by visual estimation, is 35 to   40%.   2. Mildly to moderately decreased global left ventricular systolic   function.   3. Multiple left ventricular regional wall motion abnormalities exist.   See wall motion findings.   4. Increased LV wall thickness.   5. There is severe septal left ventricular hypertrophy.   6. Thickening of the anterior and posterior mitral valve leaflets.

## 2018-05-14 PROCEDURE — 99233 SBSQ HOSP IP/OBS HIGH 50: CPT

## 2018-05-14 RX ADMIN — Medication 81 MILLIGRAM(S): at 11:34

## 2018-05-14 RX ADMIN — ROSUVASTATIN CALCIUM 20 MILLIGRAM(S): 5 TABLET ORAL at 21:41

## 2018-05-14 RX ADMIN — CLOPIDOGREL BISULFATE 75 MILLIGRAM(S): 75 TABLET, FILM COATED ORAL at 11:34

## 2018-05-14 RX ADMIN — Medication 10 MILLIGRAM(S): at 16:38

## 2018-05-14 RX ADMIN — Medication 10 MILLIGRAM(S): at 05:20

## 2018-05-14 NOTE — PROGRESS NOTE ADULT - SUBJECTIVE AND OBJECTIVE BOX
PULMONARY PROGRESS NOTE      [id number]    Patient is a 47y old  Male who presents with a chief complaint of almost passed out (11 May 2018 20:04)      INTERVAL HPI/OVERNIGHT EVENTS:Did not use CPAP last night.   Arrhythmias persist.  No desats reported on monitor.    MEDICATIONS  (STANDING):  aspirin enteric coated 81 milliGRAM(s) Oral daily  clopidogrel Tablet 75 milliGRAM(s) Oral daily  enalapril 10 milliGRAM(s) Oral every 12 hours  rivaroxaban 20 milliGRAM(s) Oral every 24 hours  rosuvastatin 20 milliGRAM(s) Oral at bedtime      MEDICATIONS  (PRN):      Allergies    No Known Allergies    Intolerances        PAST MEDICAL & SURGICAL HISTORY:  CAD (coronary artery disease)  Stented coronary artery  Thrombosis: apical  MI (myocardial infarction): 2 coronary stents  HTN (hypertension)  No significant past surgical history      SOCIAL HISTORY  Smoking History:       REVIEW OF SYSTEMS:    CONSTITUTIONAL:  No distress    HEENT:  Eyes:  No diplopia or blurred vision. ENT:  No earache, sore throat or runny nose.    CARDIOVASCULAR:  No pressure, squeezing, tightness, heaviness or aching about the chest; no palpitations.    RESPIRATORY:  No cough, shortness of breath, PND or orthopnea. Mild SOBOE    GASTROINTESTINAL:  No nausea, vomiting or diarrhea.    GENITOURINARY:  No dysuria, frequency or urgency.    MUSCULOSKELETAL:  No joint pain    SKIN:  No new lesions.    NEUROLOGIC:  No paresthesias, fasciculations, seizures or weakness.    PSYCHIATRIC:  No disorder of thought or mood.    ENDOCRINE:  No heat or cold intolerance, polyuria or polydipsia.    HEMATOLOGICAL:  No easy bruising or bleeding.     Vital Signs Last 24 Hrs  T(C): 36.4 (14 May 2018 05:16), Max: 36.8 (13 May 2018 16:01)  T(F): 97.6 (14 May 2018 05:16), Max: 98.2 (13 May 2018 16:01)  HR: 69 (14 May 2018 05:16) (69 - 77)  BP: 154/92 (14 May 2018 06:25) (146/99 - 171/99)  BP(mean): --  RR: 18 (14 May 2018 05:16) (16 - 18)  SpO2: 99% (14 May 2018 05:16) (98% - 100%)    PHYSICAL EXAMINATION:    GENERAL: The patient is awake and alert in no apparent distress.     HEENT: Head is normocephalic and atraumatic. Extraocular muscles are intact. Mucous membranes are moist.    NECK: Supple.    LUNGS: Clear to auscultation without wheezing, rales or rhonchi; respirations unlabored    HEART: Regular rate and rhythm without murmur.    ABDOMEN: Soft, nontender, and nondistended.      EXTREMITIES: Without any cyanosis, clubbing, rash, lesions or edema.    NEUROLOGIC: Grossly intact.    SKIN: No ulceration or induration present.      LABS:            Urinalysis Basic - ( 12 May 2018 12:11 )    Color: Yellow / Appearance: Clear / S.015 / pH: x  Gluc: x / Ketone: Negative  / Bili: Negative / Urobili: Negative mg/dL   Blood: x / Protein: Negative mg/dL / Nitrite: Negative   Leuk Esterase: Negative / RBC: x / WBC x   Sq Epi: x / Non Sq Epi: x / Bacteria: x              Serum Pro-Brain Natriuretic Peptide: 90 pg/mL (18 @ 15:25)          MICROBIOLOGY:    RADIOLOGY & ADDITIONAL STUDIES:

## 2018-05-14 NOTE — PROGRESS NOTE ADULT - ASSESSMENT
Imp  Arrhythmas, CAD.  Possible PAMELA  Plan--will need outpt sleep study.  Pt needs to f/u our office.

## 2018-05-14 NOTE — PROGRESS NOTE ADULT - SUBJECTIVE AND OBJECTIVE BOX
RICK LATHAM    [id number]    47y      Male    INTERVAL HPI/OVERNIGHT EVENTS:  Pt is a 48 yo male with a pmh/o CAD s/p PCI in 2006 at [facility] and MI in 9/17 at [facility] with recanalization of RCA and LAD restenosis with a mid RCA PEPE placement, found to have EF 35% and apical thrombus however was not deemed candidate for life vest as pt w/o insurance at time and lives between [facility] and . Pt presented to ED after experiencing episode of dizziness, palpitations and feeling as if he were going to faint. Pt states he had an episode where he 'passed out' and lost consciousness in [facility] while working out but did not undergo evaluation at that time. Pt states compliance with med regimen asa, plavix, xarelto.    today pt denies any sob no chest pain no new events overnight      REVIEW OF SYSTEMS:    CONSTITUTIONAL: No fever, weight loss, or fatigue  RESPIRATORY: No cough, wheezing, hemoptysis; No shortness of breath  CARDIOVASCULAR: No chest pain, palpitations  GASTROINTESTINAL: No abdominal or epigastric pain. No nausea, vomiting    Vital Signs Last 24 Hrs  T(C): 36.4 (14 May 2018 05:16), Max: 36.8 (13 May 2018 16:01)  T(F): 97.6 (14 May 2018 05:16), Max: 98.2 (13 May 2018 16:01)  HR: 102 (14 May 2018 11:13) (69 - 102)  BP: 148/98 (14 May 2018 11:13) (148/98 - 171/99)  BP(mean): --  RR: 18 (14 May 2018 11:13) (16 - 18)  SpO2: 99% (14 May 2018 11:13) (98% - 100%)    PHYSICAL EXAM:    GENERAL: NAD, well-groomed  HEENT: PERRL, +EOMI  CHEST/LUNG: Clear to auscultation bilaterally; No wheezing  HEART: S1S2+, Regular rate and rhythm; No murmurs  ABDOMEN: Soft, Nontender, Nondistended; Bowel sounds present  EXTREMITIES:  2+ Peripheral Pulses, No clubbing, cyanosis, or edema      MEDICATIONS  (STANDING):  aspirin enteric coated 81 milliGRAM(s) Oral daily  clopidogrel Tablet 75 milliGRAM(s) Oral daily  enalapril 10 milliGRAM(s) Oral every 12 hours  rosuvastatin 20 milliGRAM(s) Oral at bedtime

## 2018-05-14 NOTE — PROGRESS NOTE ADULT - ASSESSMENT
46 yo male with significant cardiac history admitted with near syncope likely due to cardiac etiology in setting of severely reduced systolic chf and apical thrombus      1) Near syncope with bradycardia/SSS  - continue   telemetry   - TTE - noted EF 35-45%  - cardio following  - per EP for stress test in am and possible pacemaker 5/16/18  - continue hold BB    2) Chronic systolic congestive heart failure: stable  - hold BB due to #1  - continue other heart meds, statin, Plavix aspirin and vasotec    3) Apical Thrombosis.    - not show on TTE 5/12/18  - hold A/C for procedure per EP    4)  Coronary artery disease involving native coronary artery of native heart with unstable angina pectoris  - Cont asa, crestor, metoprolol, enalapri    5) HgA1C - 5.9 - Prediabetes   - diet , life style changes , follow up with PMD in 3 months .

## 2018-05-14 NOTE — CONSULT NOTE ADULT - ATTENDING COMMENTS
46y/o male with PMH of hypertension, Hyperlipidemia, CAD, NYHA class II stent 2006, STEMI 9/2017 RCA and LAD restenosis with a mid RCA PEPE placed,  Ef 35% with apical thrombus on Xarelto ; patient wore Lifevest in the past but unable to continue follow up due to insurance issues. Patient now admitted with near syncope, dizziness and labile HTN. Tni (-) x 2 , ECG SR with ns st twa, 2-D echo persistent LV systolic dysfunction LVEF 35%. On telemetry episodes of marked bradycardia with HR < 30 bpm , longest pause 3.9 seconds.     Recc:    1: Chronic ischemic Cmp EF <= 35% , complaint with medications , NYHA class II , near syncope and possibly SSS with need for BB Rn. Pt currently c/o atypical cp agree with NST if no sig ischemic , patient would benefit from DDD ICD for bradycardia support and ICD for primary prevention. CT to maximize current CV ,medications.     2: SSS : ct to hold BB for now , atypical cp as above, NST pending Ct DAPT and statin    3: HTN: Labile ; BP optimization on going    4: HFrEF: Euvolemic on exam : ct load reduction therapy    5: DLD: statin   6: H/o LV apical thrombus on Xarelto. Agree with holding Xarelto for now ; current echo reveals no LV thrombus.

## 2018-05-14 NOTE — PROGRESS NOTE ADULT - ASSESSMENT
48y/o male with PMH of hypertension, Hyperlipidemia, CAD, stent 2006, stemi 9/2017 RCA and LAD restenosis with a mid RCA PEPE placed,  Ef 35% with apical thrombus; had NST in [facility] results not available admitted for near syncope.   Had a previous syncopal event while in [place],.  work up thus far, Telemetry monitoring episodes of prolonged pauses.       PAMELA: As per Pulm    HTN: Monitor    Near Syncope: EP evaluations noted    CAD: Denied CP, will obtain NST results

## 2018-05-14 NOTE — CONSULT NOTE ADULT - SUBJECTIVE AND OBJECTIVE BOX
Below history obtained via Creole  via [employer].     47 year old Creole speaking male patient with a history of CAD s/p PCI in  at [facility] and MI in  at [facility] with recanalization of RCA and LAD restenosis with a mid RCA PEPE placement, EF 35% (formerly with LifeVest) and apical thrombus (on anticoagulation) presents to ED after experiencing episode of dizziness, palpitations and feeling as if he were going to faint. Patient reports he has been having these episodes for at least 4 months now and reports he had an episode where he 'passed out' and lost consciousness in [facility] while working out but did not undergo evaluation at that time. He reports that episodes of dizziness occur when he suddenly stands up. They usually resolve after he sits down. He otherwise reports good exercise tolerance and denies any david syncope, chest pain or dyspnea. He does report frequent palpitations which he describes as "skipped heart beats."     Telemetry: Overall SR with rates in 60s with evidence of Mobitz I AVB with rates to the 40s during sleeping hours. Longest pause: 4.12seconds while sleeping.     PAST MEDICAL & SURGICAL HISTORY:  CAD (coronary artery disease)  Stented coronary artery  Thrombosis: apical  MI (myocardial infarction): 2 coronary stents  HTN (hypertension)  No significant past surgical history    REVIEW OF SYSTEMS  General: - fever or chills	  Skin/Breast: - rashes  Ophthalmologic: - blurred vision	  ENMT: - sore throat  Respiratory and Thorax: - dyspnea	  Cardiovascular: + palpitations + dizziness  Gastrointestinal:	- N/V/D/C  Genitourinary:- dysuria  Musculoskeletal:	 - arthritis  Neurological: - weaknesses  Psychiatric: - anxiety	    MEDICATIONS  (STANDING):  aspirin enteric coated 81 milliGRAM(s) Oral daily  clopidogrel Tablet 75 milliGRAM(s) Oral daily  enalapril 10 milliGRAM(s) Oral every 12 hours  rivaroxaban 20 milliGRAM(s) Oral every 24 hours  rosuvastatin 20 milliGRAM(s) Oral at bedtime    Allergies  No Known Allergies    SOCIAL HISTORY: former smoker, former drinker    FAMILY HISTORY:  Family history of MI (myocardial infarction) (Father)    Vital Signs Last 24 Hrs  T(C): 36.4 (14 May 2018 05:16), Max: 36.8 (13 May 2018 16:01)  T(F): 97.6 (14 May 2018 05:16), Max: 98.2 (13 May 2018 16:01)  HR: 69 (14 May 2018 05:16) (69 - 77)  BP: 154/92 (14 May 2018 06:25) (146/99 - 171/99)  RR: 18 (14 May 2018 05:16) (16 - 18)  SpO2: 99% (14 May 2018 05:16) (98% - 100%)    Physical Exam:  Constitutional: AAOx3, NAD  Neck: supple, No JVD  Cardiovascular: +S1S2 RRR, HERNAN at apex 2/6  Pulmonary: CTA b/l, unlabored, no wheezes, rales or rhonchi  Abdomen: +BS, soft NTND  Extremities: no edema b/l,   Neuro: non focal, speech clear, PINEDO x 4    LABS:  Urinalysis Basic - ( 12 May 2018 12:11 )  Color: Yellow / Appearance: Clear / S.015 / pH: x  Gluc: x / Ketone: Negative  / Bili: Negative / Urobili: Negative mg/dL   Blood: x / Protein: Negative mg/dL / Nitrite: Negative   Leuk Esterase: Negative / RBC: x / WBC x   Sq Epi: x / Non Sq Epi: x / Bacteria: x    RADIOLOGY & ADDITIONAL STUDIES:  Echo: 18  Summary:   1. Left ventricular ejection fraction, by visual estimation, is 35 to   40%.   2. Mildly to moderately decreased global left ventricular systolic   function.   3. Multiple left ventricular regional wall motion abnormalities exist.   See wall motion findings.   4. Increased LV wall thickness.   5. There is severe septal left ventricular hypertrophy.   6. Thickening of the anterior and posterior mitral valve leaflets.    A/P  47 year old male patient with a history of CAD s/p MI, PCI to RCA, EF 35-40%, narrow QRS who is admitted with near syncope and telemetry evidence of Mobitz I AVB.     - Near syncope events described as potentially orthostatic in nature. No symptoms while sleeping.   - Would treat sleep apnea and pursue outpatient sleep study  - Avoid AV lisandra blocking agents for now, although will likely need long term beta blockers for CAD  - No clear evidence for pacemaker at this time. Potential ischemic work up  - Obtain orthostatics.   - Official recommendations to follow.

## 2018-05-14 NOTE — PROGRESS NOTE ADULT - SUBJECTIVE AND OBJECTIVE BOX
CARDIOLOGY PROGRESS NOTE   ([facility])                                                                                                        Subjective:     Alert, awake  Denied Dyspnea, CP    Vitals:  T(C): 36.4 (05-14-18 @ 05:16), Max: 36.8 (05-13-18 @ 16:01)  HR: 69 (05-14-18 @ 05:16) (69 - 71)  BP: 154/92 (05-14-18 @ 06:25) (152/93 - 171/99)  RR: 18 (05-14-18 @ 05:16) (16 - 18)  SpO2: 99% (05-14-18 @ 05:16) (98% - 100%)  Wt(kg): --  I&O's Summary    13 May 2018 07:01  -  14 May 2018 07:00  --------------------------------------------------------  IN: 360 mL / OUT: 900 mL / NET: -540 mL          PHYSICAL EXAM:  Appearance: Normal	  HEENT:   Atraumatic  Cardiovascular: Normal S1 S2, No JVD, No murmurs, No edema  Respiratory: Lungs clear to auscultation	  Gastrointestinal:  Soft, Non-tender, + BS	  Skin: No rashes, No ecchymoses, No cyanosis  Neurologic: Alert and awake, able to move extremities  Extremities: No edema    TELEMETRY: 	    prolonged pauses 4.2 sec    CURRENT MEDICATIONS:  enalapril 10 milliGRAM(s) Oral every 12 hours            rosuvastatin 20 milliGRAM(s) Oral at bedtime    aspirin enteric coated 81 milliGRAM(s) Oral daily  clopidogrel Tablet 75 milliGRAM(s) Oral daily  rivaroxaban 20 milliGRAM(s) Oral every 24 hours      LABS:	 	          proBNP: Serum Pro-Brain Natriuretic Peptide: 90 pg/mL (05-11 @ 15:25)    Lipid Profile: Date: 05-12 @ 06:01  Total cholesterol 138; Direct LDL: 84; HDL: 44; Triglycerides:48    HgA1c:   TSH: Thyroid Stimulating Hormone, Serum: 1.35 uIU/mL (05-12 @ 06:01)

## 2018-05-15 PROCEDURE — 99232 SBSQ HOSP IP/OBS MODERATE 35: CPT

## 2018-05-15 PROCEDURE — 93016 CV STRESS TEST SUPVJ ONLY: CPT

## 2018-05-15 PROCEDURE — 93018 CV STRESS TEST I&R ONLY: CPT

## 2018-05-15 PROCEDURE — 78452 HT MUSCLE IMAGE SPECT MULT: CPT | Mod: 26

## 2018-05-15 RX ADMIN — Medication 10 MILLIGRAM(S): at 06:07

## 2018-05-15 RX ADMIN — ROSUVASTATIN CALCIUM 20 MILLIGRAM(S): 5 TABLET ORAL at 21:33

## 2018-05-15 RX ADMIN — CLOPIDOGREL BISULFATE 75 MILLIGRAM(S): 75 TABLET, FILM COATED ORAL at 13:19

## 2018-05-15 RX ADMIN — Medication 10 MILLIGRAM(S): at 17:18

## 2018-05-15 RX ADMIN — Medication 81 MILLIGRAM(S): at 13:19

## 2018-05-15 NOTE — PROGRESS NOTE ADULT - ASSESSMENT
46 yo male with significant cardiac history admitted with near syncope likely due to cardiac etiology in setting of severely reduced systolic chf and apical thrombus      1) Near syncope with bradycardia/SSS  - continue   telemetry   - TTE - noted EF 35-45%  - cardio following  - per EP for stress test today and possible pacemaker 5/16/18  - continue hold BB    2) Chronic systolic congestive heart failure: stable  - hold BB due to #1  - continue other heart meds, statin, Plavix aspirin and Vasotec    3) Apical Thrombosis.    - not show on TTE 5/12/18  - hold A/C for procedure per EP    4)  Coronary artery disease involving native coronary artery of native heart with unstable angina pectoris  - Cont asa, Crestor metoprolol, enalapril    5) HgA1C - 5.9 - Prediabetes   - diet , life style changes , follow up with PMD in 3 months .

## 2018-05-15 NOTE — PROGRESS NOTE ADULT - SUBJECTIVE AND OBJECTIVE BOX
RICK LATHAM    [id number]    47y      Male    INTERVAL HPI/OVERNIGHT EVENTS:  Pt is a 48 yo male with a pmh/o CAD s/p PCI in 2006 at [facility] and MI in 9/17 at [facility] with recanalization of RCA and LAD restenosis with a mid RCA PEPE placement, found to have EF 35% and apical thrombus however was not deemed candidate for life vest as pt w/o insurance at time and lives between [facility] and . Pt presented to ED after experiencing episode of dizziness, palpitations and feeling as if he were going to faint. Pt states he had an episode where he 'passed out' and lost consciousness in [facility] while working out but did not undergo evaluation at that time. Pt states compliance with med regimen asa, plavix, xarelto.    today pt denies any sob no chest pain no new events overnight      REVIEW OF SYSTEMS:    CONSTITUTIONAL: No fever, weight loss, or fatigue  RESPIRATORY: No cough, wheezing, hemoptysis; No shortness of breath  CARDIOVASCULAR: No chest pain, palpitations  GASTROINTESTINAL: No abdominal or epigastric pain. No nausea, vomiting        Vital Signs Last 24 Hrs  T(C): 36.5 (15 May 2018 09:53), Max: 37.1 (14 May 2018 16:15)  T(F): 97.7 (15 May 2018 09:53), Max: 98.7 (14 May 2018 16:15)  HR: 68 (15 May 2018 09:53) (66 - 77)  BP: 138/84 (15 May 2018 09:53) (138/84 - 167/100)  BP(mean): --  RR: 17 (15 May 2018 09:53) (17 - 20)  SpO2: 98% (15 May 2018 09:53) (95% - 98%)    PHYSICAL EXAM:    GENERAL: NAD, well-groomed  HEENT: PERRL, +EOMI  CHEST/LUNG: Clear to auscultation bilaterally; No wheezing  HEART: S1S2+, Regular rate and rhythm; No murmurs  ABDOMEN: Soft, Nontender, Nondistended; Bowel sounds present  EXTREMITIES:  2+ Peripheral Pulses, No clubbing, cyanosis, or edema      MEDICATIONS  (STANDING):  aspirin enteric coated 81 milliGRAM(s) Oral daily  clopidogrel Tablet 75 milliGRAM(s) Oral daily  enalapril 10 milliGRAM(s) Oral every 12 hours  rosuvastatin 20 milliGRAM(s) Oral at bedtime

## 2018-05-15 NOTE — PROGRESS NOTE ADULT - SUBJECTIVE AND OBJECTIVE BOX
Patient seen today in bed. Stress Test reviewed. Patient's questions answered. Anxious about upcoming ICD procedure.     TELE: heart block in AM. No events afterwards.     MEDICATIONS  (STANDING):  aspirin enteric coated 81 milliGRAM(s) Oral daily  clopidogrel Tablet 75 milliGRAM(s) Oral daily  enalapril 10 milliGRAM(s) Oral every 12 hours  rosuvastatin 20 milliGRAM(s) Oral at bedtime    MEDICATIONS  (PRN):    Allergies  No Known Allergies    PAST MEDICAL & SURGICAL HISTORY:  CAD (coronary artery disease)  Stented coronary artery  Thrombosis: apical  MI (myocardial infarction): 2 coronary stents  HTN (hypertension)  No significant past surgical history    Vital Signs Last 24 Hrs  T(C): 36.9 (15 May 2018 15:31), Max: 36.9 (15 May 2018 15:31)  T(F): 98.4 (15 May 2018 15:31), Max: 98.4 (15 May 2018 15:31)  HR: 76 (15 May 2018 15:31) (66 - 87)  BP: 135/79 (15 May 2018 15:31) (130/74 - 164/98)  RR: 18 (15 May 2018 15:31) (17 - 18)  SpO2: 95% (15 May 2018 15:31) (95% - 98%)    Physical Exam:  Constitutional: NAD, AAOx3  Cardiovascular: +S1S2 RRR  Pulmonary: CTA b/l, unlabored  GI: soft NTND +BS  Extremities: no pedal edema,   Neuro: non focal, PINEDO x4    RADIOLOGY & ADDITIONAL TESTS:  Nuclear Stress Test: 5/15[birth year]  *  Apical walls akinetic. Inferior wall akinetic. Post  stress LVEF 36%.  Conclusion:  There is a medium sized, mild to moderate defect in  inferior wall that is predominantly fixed, suggestive of  infarction with minimal austin-infarct ischemia. There is a  medium sized, severe defect in apical walls that is  predominantly fixed, suggestive of infarction with minimal  austin-infarct ischemia.    Echo: 5/12/18  Summary:   1. Left ventricular ejection fraction, by visual estimation, is 35 to   40%.   2. Mildly to moderately decreased global left ventricular systolic   function.   3. Multiple left ventricular regional wall motion abnormalities exist.   See wall motion findings.   4. Increased LV wall thickness.   5. There is severe septal left ventricular hypertrophy.   6. Thickening of the anterior and posterior mitral valve leaflets.    A/P  47 year old male patient with a history of CAD s/p MI, PCI to RCA, EF 35-40%, narrow QRS who is admitted with near syncope and telemetry evidence of Mobitz I AVB.     - Stress Test with fixed defects. No active ischemia  - No evidence of LV thrombus on TTE. No future Xarelto needed.   - NPO post midnight for ICD in OR in AM.   - After ICD can start heart failure approved beta blocker.   - Will follow.

## 2018-05-16 ENCOUNTER — TRANSCRIPTION ENCOUNTER (OUTPATIENT)
Age: 48
End: 2018-05-16

## 2018-05-16 LAB
ANION GAP SERPL CALC-SCNC: 15 MMOL/L — SIGNIFICANT CHANGE UP (ref 5–17)
BUN SERPL-MCNC: 15 MG/DL — SIGNIFICANT CHANGE UP (ref 8–20)
CALCIUM SERPL-MCNC: 9.1 MG/DL — SIGNIFICANT CHANGE UP (ref 8.6–10.2)
CHLORIDE SERPL-SCNC: 103 MMOL/L — SIGNIFICANT CHANGE UP (ref 98–107)
CO2 SERPL-SCNC: 25 MMOL/L — SIGNIFICANT CHANGE UP (ref 22–29)
CREAT SERPL-MCNC: 0.93 MG/DL — SIGNIFICANT CHANGE UP (ref 0.5–1.3)
GLUCOSE SERPL-MCNC: 81 MG/DL — SIGNIFICANT CHANGE UP (ref 70–115)
HCT VFR BLD CALC: 48.3 % — SIGNIFICANT CHANGE UP (ref 42–52)
HGB BLD-MCNC: 15.9 G/DL — SIGNIFICANT CHANGE UP (ref 14–18)
MAGNESIUM SERPL-MCNC: 2.2 MG/DL — SIGNIFICANT CHANGE UP (ref 1.6–2.6)
MCHC RBC-ENTMCNC: 28.5 PG — SIGNIFICANT CHANGE UP (ref 27–31)
MCHC RBC-ENTMCNC: 32.9 G/DL — SIGNIFICANT CHANGE UP (ref 32–36)
MCV RBC AUTO: 86.6 FL — SIGNIFICANT CHANGE UP (ref 80–94)
PLATELET # BLD AUTO: 203 K/UL — SIGNIFICANT CHANGE UP (ref 150–400)
POTASSIUM SERPL-MCNC: 3.7 MMOL/L — SIGNIFICANT CHANGE UP (ref 3.5–5.3)
POTASSIUM SERPL-SCNC: 3.7 MMOL/L — SIGNIFICANT CHANGE UP (ref 3.5–5.3)
RBC # BLD: 5.58 M/UL — SIGNIFICANT CHANGE UP (ref 4.6–6.2)
RBC # FLD: 12.9 % — SIGNIFICANT CHANGE UP (ref 11–15.6)
SODIUM SERPL-SCNC: 143 MMOL/L — SIGNIFICANT CHANGE UP (ref 135–145)
WBC # BLD: 5.2 K/UL — SIGNIFICANT CHANGE UP (ref 4.8–10.8)
WBC # FLD AUTO: 5.2 K/UL — SIGNIFICANT CHANGE UP (ref 4.8–10.8)

## 2018-05-16 PROCEDURE — 33249 INSJ/RPLCMT DEFIB W/LEAD(S): CPT

## 2018-05-16 PROCEDURE — 99232 SBSQ HOSP IP/OBS MODERATE 35: CPT

## 2018-05-16 PROCEDURE — 93010 ELECTROCARDIOGRAM REPORT: CPT

## 2018-05-16 RX ORDER — CEFAZOLIN SODIUM 1 G
2000 VIAL (EA) INJECTION
Qty: 0 | Refills: 0 | Status: DISCONTINUED | OUTPATIENT
Start: 2018-05-16 | End: 2018-05-16

## 2018-05-16 RX ORDER — CARVEDILOL PHOSPHATE 80 MG/1
3.12 CAPSULE, EXTENDED RELEASE ORAL EVERY 12 HOURS
Qty: 0 | Refills: 0 | Status: DISCONTINUED | OUTPATIENT
Start: 2018-05-16 | End: 2018-05-17

## 2018-05-16 RX ORDER — ACETAMINOPHEN 500 MG
650 TABLET ORAL EVERY 4 HOURS
Qty: 0 | Refills: 0 | Status: DISCONTINUED | OUTPATIENT
Start: 2018-05-16 | End: 2018-05-17

## 2018-05-16 RX ORDER — TRAMADOL HYDROCHLORIDE 50 MG/1
25 TABLET ORAL EVERY 4 HOURS
Qty: 0 | Refills: 0 | Status: DISCONTINUED | OUTPATIENT
Start: 2018-05-16 | End: 2018-05-17

## 2018-05-16 RX ORDER — CEFAZOLIN SODIUM 1 G
2000 VIAL (EA) INJECTION
Qty: 0 | Refills: 0 | Status: COMPLETED | OUTPATIENT
Start: 2018-05-16 | End: 2018-05-17

## 2018-05-16 RX ORDER — OXYCODONE AND ACETAMINOPHEN 5; 325 MG/1; MG/1
1 TABLET ORAL EVERY 4 HOURS
Qty: 0 | Refills: 0 | Status: DISCONTINUED | OUTPATIENT
Start: 2018-05-16 | End: 2018-05-17

## 2018-05-16 RX ADMIN — ROSUVASTATIN CALCIUM 20 MILLIGRAM(S): 5 TABLET ORAL at 22:01

## 2018-05-16 RX ADMIN — Medication 10 MILLIGRAM(S): at 05:24

## 2018-05-16 RX ADMIN — CARVEDILOL PHOSPHATE 3.12 MILLIGRAM(S): 80 CAPSULE, EXTENDED RELEASE ORAL at 22:01

## 2018-05-16 RX ADMIN — Medication 10 MILLIGRAM(S): at 19:26

## 2018-05-16 NOTE — DISCHARGE NOTE ADULT - PROVIDER TOKENS
FREE:[LAST:[Ambreen],FIRST:[David],PHONE:[phone]],FAX:[phone]],ADDRESS:[[facility] [place] [address]  Cardiology Clinic 1st Floor]]

## 2018-05-16 NOTE — PROGRESS NOTE ADULT - SUBJECTIVE AND OBJECTIVE BOX
Events noted : no acute complaints    TELE: heart block in AM; NSVT overnight two runs    MEDICATIONS  (STANDING):  aspirin enteric coated 81 milliGRAM(s) Oral daily  clopidogrel Tablet 75 milliGRAM(s) Oral daily  enalapril 10 milliGRAM(s) Oral every 12 hours  rosuvastatin 20 milliGRAM(s) Oral at bedtime    MEDICATIONS  (PRN):    Allergies  No Known Allergies    PAST MEDICAL & SURGICAL HISTORY:  CAD (coronary artery disease)  Stented coronary artery  Thrombosis: apical  MI (myocardial infarction): 2 coronary stents  HTN (hypertension)  No significant past surgical history    Vital Signs Last 24 Hrs  T(C): 36.9 (15 May 2018 15:31), Max: 36.9 (15 May 2018 15:31)  T(F): 98.4 (15 May 2018 15:31), Max: 98.4 (15 May 2018 15:31)  HR: 76 (15 May 2018 15:31) (66 - 87)  BP: 135/79 (15 May 2018 15:31) (130/74 - 164/98)  RR: 18 (15 May 2018 15:31) (17 - 18)  SpO2: 95% (15 May 2018 15:31) (95% - 98%)    Physical Exam:  Constitutional: NAD, AAOx3  Cardiovascular: +S1S2 RRR  Pulmonary: CTA b/l, unlabored  GI: soft NTND +BS  Extremities: no pedal edema,   Neuro: non focal, PINEDO x4    RADIOLOGY & ADDITIONAL TESTS:  Nuclear Stress Test: 5/15/48  *  Apical walls akinetic. Inferior wall akinetic. Post  stress LVEF 36%.  Conclusion:  There is a medium sized, mild to moderate defect in  inferior wall that is predominantly fixed, suggestive of  infarction with minimal austin-infarct ischemia. There is a  medium sized, severe defect in apical walls that is  predominantly fixed, suggestive of infarction with minimal  austin-infarct ischemia.    Echo: 5/12/18  Summary:   1. Left ventricular ejection fraction, by visual estimation, is 35 to   40%.   2. Mildly to moderately decreased global left ventricular systolic   function.   3. Multiple left ventricular regional wall motion abnormalities exist.   See wall motion findings.   4. Increased LV wall thickness.   5. There is severe septal left ventricular hypertrophy.   6. Thickening of the anterior and posterior mitral valve leaflets.    A/P  47 year old male patient with a history of CAD s/p MI, PCI to RCA, EF 35-40%, narrow QRS who is admitted with near syncope and telemetry evidence of Mobitz I AVB.     - Stress Test with fixed defects. No active ischemia  - No evidence of LV thrombus on TTE. No future Xarelto needed.   - NPO post midnight for ICD today   - After ICD can start heart failure approved beta blocker.   - Informed consent obtained via  all questions answered

## 2018-05-16 NOTE — DISCHARGE NOTE ADULT - CARE PLAN
Principal Discharge DX:	Chronic systolic congestive heart failure  Goal:	s/p dual chamber Medtronic ICD  Assessment and plan of treatment:	1. You have a follow up for your cardiac device on May 29th at 1:30PM with Dr. Crook  2. Keep affected arm below shoulder, with no heavy lifting for 6 weeks.    3. Keep the site dry (no showers) for 48 hours.    4. Leave the sutures in place. They will be removed at your follow up appointment.   5. Please notify the physician if there is any bleeding, drainage or swelling of the site.    6. Please notify the physician of any fever greater than 100.4. Principal Discharge DX:	Chronic systolic congestive heart failure  Goal:	s/p single chamber Medtronic ICD  Assessment and plan of treatment:	1. You have a follow up for your cardiac device on May 29th at 1:30PM with Dr. Crook  2. Keep affected arm below shoulder, with no heavy lifting for 6 weeks.    3. Keep the site dry (no showers) for 48 hours.    4. Leave the sutures in place. They will be removed at your follow up appointment.   5. Please notify the physician if there is any bleeding, drainage or swelling of the site.    6. Please notify the physician of any fever greater than 100.4. Principal Discharge DX:	Chronic systolic congestive heart failure  Goal:	s/p single chamber Medtronic ICD  Assessment and plan of treatment:	1. You have a follow up for your cardiac device on May 29th at 1:30PM with Dr. Crook  2. Keep affected arm below shoulder, with no heavy lifting for 6 weeks.    3. Keep the site dry (no showers) for 48 hours.    4. Leave the sutures in place. They will be removed at your follow up appointment.   5. Please notify the physician if there is any bleeding, drainage or swelling of the site.    6. Please notify the physician of any fever greater than 100.4.  7. take heart meds as prescribed  Secondary Diagnosis:	Near syncope  Goal:	resolved  Secondary Diagnosis:	HTN (hypertension)  Assessment and plan of treatment:	take home meds  Secondary Diagnosis:	Coronary artery disease involving native coronary artery of native heart with unstable angina pectoris  Assessment and plan of treatment:	take home meds

## 2018-05-16 NOTE — DISCHARGE NOTE ADULT - REASON FOR ADMISSION
Admission following elective dual chamber ICD implant. Admission following elective single chamber ICD implant.

## 2018-05-16 NOTE — DISCHARGE NOTE ADULT - OTHER SIGNIFICANT FINDINGS
Nuclear stress:  IMPRESSIONS:Abnormal Study  * Exercise capacity: 11 METS, Average for age and gender.  93% of MPHR.  * Chest Pain: No chest pain with exercise.  * HR Response: Appropriate.  * BP Response: Appropriate.  * Heart Rhythm: Sinus Rhythm - 85 BPM.  * ECG Abnormalities: There were no diagnostic changes.  * Arrhythmia: occasional PVCs in the early exercise period  and recvoery period.  * There is a medium sized, mild to moderate defect in  inferior wall that is predominantly fixed, suggestive of  infarction with minimal austin-infarct ischemia.There is a  medium sized, severe defect in apical walls that is  predominantly fixed, suggestive of infarction with minimal  austin-infarct ischemia.  *  Apical walls akinetic. Inferior wall akinetic. Post  stress LVEF 36%.    Conclusion:  There is a medium sized, mild to moderate defect in  inferior wall that is predominantly fixed, suggestive of  infarction with minimal austin-infarct ischemia.There is a  medium sized, severe defect in apical walls that is  predominantly fixed, suggestive of infarction with minimal  austin-infarct ischemia.    Confirmed on  5/15/2018 - 16:40:03 by Reuben Michelle MD   Cardiology Fellow: Dana Jean    Carotid U/S:  Findings: There is no plaque present in either carotid system. No   disturbed color-flow or elevated blood flow velocities are encountered.   The distal right internal carotid artery is tortuous. Antegrade flow is   present in both vertebral arteries.      Blood flow velocities are as follows:    RIGHT:    PROX CCA = 90 ;  DIST CCA = 86 ;  PROX ICA = 68 ;  DIST ICA =   50 ;  ECA = 95    LEFT   :    PROX CCA = 89 ;  DIST CCA = 89 ;  PROX ICA = C4 ;  DIST ICA =   C6 ;  ECA = 71    Impression:. Normal carotid duplex without evidence of hemodynamically   significant stenosis.     DEA RUIZ M.D., ATTENDING RADIOLOGIST  This document has been electronically signed. May 12 2018 11:58AM    2Decho:  PHYSICIAN INTERPRETATION:  Left Ventricle: The left ventricular internal cavity size is mild to   moderately increased. Left ventricular wall thickness is increased. There   is severe septal left ventricular hypertrophy involving the septal and   posterior wall. The LVH involves septal and posterior walls.  Global LV systolic function was mildly to moderately decreased. Left   ventricular ejection fraction, by visual estimation, is 35 to 40%.       LV Wall Scoring:  The apical septal segment, apical lateral segment, and apical anterior   segment  are akinetic. The mid anteroseptal segment and apical inferior segment are  hypokinetic.    Right Ventricle: Normal right ventricular size and function. TV S' 0.1   m/s.  Left Atrium: The left atrium is normal in size.  Right Atrium: The right atrium is normal in size.  Pericardium: There is no evidence of pericardial effusion.  Mitral Valve: Structurally normal mitral valve, with normal leaflet   excursion. Thickening of the anterior and posterior mitral valve   leaflets. No evidence of mitral valve regurgitation is seen.  Tricuspid Valve: Structurally normal tricuspid valve, with normal leaflet   excursion. Trivial tricuspid regurgitation is visualized.  Aortic Valve: Normal trileaflet aortic valve with normal opening. No   evidence of aortic valve regurgitation is seen.  Pulmonic Valve: Structurally normal pulmonic valve, with normal leaflet   excursion. No indication of pulmonic valve regurgitation.  Aorta: The aortic root is normal in size and structure.  Pulmonary Artery: The main pulmonary artery is normal in size.  Venous: The inferior vena cava was normal sized, with respiratory size   variation greater than 50%.    Summary:   1. Left ventricular ejection fraction, by visual estimation, is 35 to   40%.   2. Mildly to moderately decreased global left ventricular systolic   function.   3. Multiple left ventricular regional wall motion abnormalities exist.   See wall motion findings.   4. Increased LV wall thickness.   5. There is severe septal left ventricular hypertrophy.   6. Thickening of the anterior and posterior mitral valve leaflets.  [id number] Negro Hanson MD, Electronically signed on 5/12/2018 at 12:38:30 PM

## 2018-05-16 NOTE — PROGRESS NOTE ADULT - SUBJECTIVE AND OBJECTIVE BOX
RICK LATHAM    [id number]    47y      Male    INTERVAL HPI/OVERNIGHT EVENTS:  Pt is a 46 yo male with a pmh/o CAD s/p PCI in 2006 at [facility] and MI in 9/17 at [facility] with recanalization of RCA and LAD restenosis with a mid RCA PEPE placement, found to have EF 35% and apical thrombus however was not deemed candidate for life vest as pt w/o insurance at time and lives between [facility] and . Pt presented to ED after experiencing episode of dizziness, palpitations and feeling as if he were going to faint. Pt states he had an episode where he 'passed out' and lost consciousness in [facility] while working out but did not undergo evaluation at that time. Pt states compliance with med regimen asa, plavix, xarelto.    today pt denies any sob no chest pain no new events overnight      REVIEW OF SYSTEMS:    CONSTITUTIONAL: No fever, weight loss, or fatigue  RESPIRATORY: No cough, wheezing, hemoptysis; No shortness of breath  CARDIOVASCULAR: No chest pain, palpitations  GASTROINTESTINAL: No abdominal or epigastric pain. No nausea, vomiting      Vital Signs Last 24 Hrs  T(C): 36.8 (16 May 2018 10:16), Max: 36.9 (15 May 2018 15:31)  T(F): 98.2 (16 May 2018 10:16), Max: 98.4 (15 May 2018 15:31)  HR: 75 (16 May 2018 10:16) (64 - 87)  BP: 142/86 (16 May 2018 10:16) (130/74 - 162/98)  BP(mean): --  RR: 18 (16 May 2018 10:16) (16 - 18)  SpO2: 98% (16 May 2018 05:21) (95% - 100%)    PHYSICAL EXAM:  GENERAL: NAD, well-groomed  HEENT: PERRL, +EOMI  CHEST/LUNG: Clear to auscultation bilaterally; No wheezing  HEART: S1S2+, Regular rate and rhythm; No murmurs  ABDOMEN: Soft, Nontender, Nondistended; Bowel sounds present  EXTREMITIES:  2+ Peripheral Pulses, No clubbing, cyanosis, or edema      LABS:                        15.9   5.2   )-----------( 203      ( 16 May 2018 06:46 )             48.3     05-16    143  |  103  |  15.0  ----------------------------<  81  3.7   |  25.0  |  0.93    Ca    9.1      16 May 2018 06:46  Mg     2.2     05-16              MEDICATIONS  (STANDING):  aspirin enteric coated 81 milliGRAM(s) Oral daily  clopidogrel Tablet 75 milliGRAM(s) Oral daily  enalapril 10 milliGRAM(s) Oral every 12 hours  rosuvastatin 20 milliGRAM(s) Oral at bedtime    MEDICATIONS  (PRN):      RADIOLOGY & ADDITIONAL TESTS:  Nuclear stress:  IMPRESSIONS:Abnormal Study  * Exercise capacity: 11 METS, Average for age and gender.  93% of MPHR.  * Chest Pain: No chest pain with exercise.  * HR Response: Appropriate.  * BP Response: Appropriate.  * Heart Rhythm: Sinus Rhythm - 85 BPM.  * ECG Abnormalities: There were no diagnostic changes.  * Arrhythmia: occasional PVCs in the early exercise period  and recvoery period.  * There is a medium sized, mild to moderate defect in  inferior wall that is predominantly fixed, suggestive of  infarction with minimal austin-infarct ischemia.There is a  medium sized, severe defect in apical walls that is  predominantly fixed, suggestive of infarction with minimal  austin-infarct ischemia.  *  Apical walls akinetic. Inferior wall akinetic. Post  stress LVEF 36%.    Conclusion:  There is a medium sized, mild to moderate defect in  inferior wall that is predominantly fixed, suggestive of  infarction with minimal austin-infarct ischemia.There is a  medium sized, severe defect in apical walls that is  predominantly fixed, suggestive of infarction with minimal  austin-infarct ischemia.  ------------------------------------------------------------------------      ------------------------------------------------------------------------    Confirmed on  5/15/2018 - 16:40:03 by Reuben Michelle MD   Cardiology Fellow: Dana Jean

## 2018-05-16 NOTE — PROGRESS NOTE ADULT - SUBJECTIVE AND OBJECTIVE BOX
Patient seen today in bed following implant of left sided dual chamber ICD. No acute post operative complaints.     EKG:  TELE:    MEDICATIONS  (STANDING):  aspirin enteric coated 81 milliGRAM(s) Oral daily  carvedilol 3.125 milliGRAM(s) Oral every 12 hours  ceFAZolin   IVPB 2000 milliGRAM(s) IV Intermittent <User Schedule>  clopidogrel Tablet 75 milliGRAM(s) Oral daily  enalapril 10 milliGRAM(s) Oral every 12 hours  rosuvastatin 20 milliGRAM(s) Oral at bedtime    MEDICATIONS  (PRN):  acetaminophen   Tablet. 650 milliGRAM(s) Oral every 4 hours PRN Mild Pain (1 - 3)  oxyCODONE    5 mG/acetaminophen 325 mG 1 Tablet(s) Oral every 4 hours PRN Moderate Pain (4 - 6)  traMADol 25 milliGRAM(s) Oral every 4 hours PRN Severe Pain (7 - 10)    Allergies  No Known Allergies    PAST MEDICAL & SURGICAL HISTORY:  CAD (coronary artery disease)  Stented coronary artery  Thrombosis: apical  MI (myocardial infarction): 2 coronary stents  HTN (hypertension)  No significant past surgical history    Vital Signs Last 24 Hrs  T(C): 36.6 (16 May 2018 13:39), Max: 36.8 (16 May 2018 10:16)  T(F): 97.8 (16 May 2018 13:39), Max: 98.2 (16 May 2018 10:16)  HR: 77 (16 May 2018 17:15) (64 - 80)  BP: 162/100 (16 May 2018 17:15) (130/90 - 164/97)  RR: 16 (16 May 2018 17:15) (16 - 18)  SpO2: 95% (16 May 2018 17:15) (95% - 100%)    Physical Exam:  Left Sided pressure dressing dry and intact.     LABS:                        15.9   5.2   )-----------( 203      ( 16 May 2018 06:46 )             48.3     05-16    143  |  103  |  15.0  ----------------------------<  81  3.7   |  25.0  |  0.93    Ca    9.1      16 May 2018 06:46  Mg     2.2     05-16    A/P  47 year old male patient with a history of CAD s/p MI, PCI to RCA, EF 35-40%, narrow QRS who is now s/p Medtronic dual chamber ICD.     - Post Op Per Protocol  - Continue heart failure medications. Added Coreg 3.125mg PO BID  - Left Arm precautions  - Pain management  - Wound check in two weeks with Dr. Crook in 2 weeks time at [facility]  - PA/LAT CXR in AM, Labs, and EKG ordered.   - Ancef 2G IVPB x 2 doses followed by Doxycycline 100mg PO BID x 5 days  - Discharge planning tomorrow ok from EP perspective if patient stable.   - Will follow. Patient seen today in bed following implant of left sided dual chamber ICD. No acute post operative complaints.   Settings: DDD     EKG: NSR at 85bpm; qRSD 114ms    MEDICATIONS  (STANDING):  aspirin enteric coated 81 milliGRAM(s) Oral daily  carvedilol 3.125 milliGRAM(s) Oral every 12 hours  ceFAZolin   IVPB 2000 milliGRAM(s) IV Intermittent <User Schedule>  clopidogrel Tablet 75 milliGRAM(s) Oral daily  enalapril 10 milliGRAM(s) Oral every 12 hours  rosuvastatin 20 milliGRAM(s) Oral at bedtime    MEDICATIONS  (PRN):  acetaminophen   Tablet. 650 milliGRAM(s) Oral every 4 hours PRN Mild Pain (1 - 3)  oxyCODONE    5 mG/acetaminophen 325 mG 1 Tablet(s) Oral every 4 hours PRN Moderate Pain (4 - 6)  traMADol 25 milliGRAM(s) Oral every 4 hours PRN Severe Pain (7 - 10)    Allergies  No Known Allergies    PAST MEDICAL & SURGICAL HISTORY:  CAD (coronary artery disease)  Stented coronary artery  Thrombosis: apical  MI (myocardial infarction): 2 coronary stents  HTN (hypertension)  No significant past surgical history    Vital Signs Last 24 Hrs  T(C): 36.6 (16 May 2018 13:39), Max: 36.8 (16 May 2018 10:16)  T(F): 97.8 (16 May 2018 13:39), Max: 98.2 (16 May 2018 10:16)  HR: 77 (16 May 2018 17:15) (64 - 80)  BP: 162/100 (16 May 2018 17:15) (130/90 - 164/97)  RR: 16 (16 May 2018 17:15) (16 - 18)  SpO2: 95% (16 May 2018 17:15) (95% - 100%)    Physical Exam:  Left Sided pressure dressing dry and intact.     LABS:                        15.9   5.2   )-----------( 203      ( 16 May 2018 06:46 )             48.3     05-16    143  |  103  |  15.0  ----------------------------<  81  3.7   |  25.0  |  0.93    Ca    9.1      16 May 2018 06:46  Mg     2.2     05-16    A/P  47 year old male patient with a history of CAD s/p MI, PCI to RCA, EF 35-40%, narrow QRS who is now s/p Medtronic dual chamber ICD.     - Post Op Per Protocol  - Continue heart failure medications. Added Coreg 3.125mg PO BID  - Left Arm precautions  - Pain management  - Wound check in two weeks with Dr. Crook in 2 weeks time at [facility]  - PA/LAT CXR in AM, Labs, and EKG ordered.   - Ancef 2G IVPB x 2 doses followed by Doxycycline 100mg PO BID x 5 days  - Discharge planning tomorrow ok from EP perspective if patient stable.   - Will follow.

## 2018-05-16 NOTE — PROGRESS NOTE ADULT - ASSESSMENT
48 yo male with significant cardiac history admitted with near syncope likely due to cardiac etiology in setting of severely reduced systolic chf and apical thrombus      1) Near syncope with bradycardia/SSS  - continue   telemetry   - TTE - noted EF 35-45%  - cardio EP following  Per EP:  - Stress Test with fixed defects. No active ischemia  - No evidence of LV thrombus on TTE. No future Xarelto needed.   - For AICD today  - After ICD can start heart failure approved beta blocker.     2) Chronic systolic congestive heart failure: stable  - hold BB due to #1  - continue other heart meds, statin, Plavix aspirin and Vasotec    3) Apical Thrombosis.    - not show on TTE 5/12/18  - hold A/C for procedure per EP    4)  Coronary artery disease involving native coronary artery of native heart with unstable angina pectoris  - Cont asa, Crestor metoprolol, enalapril    5) HgA1C - 5.9 - Prediabetes   - diet , life style changes , follow up with PMD in 3 months . 48 yo male with significant cardiac history admitted with near syncope likely due to cardiac etiology in setting of severely reduced systolic chf and apical thrombus      1) Near syncope with bradycardia/SSS  - continue   telemetry   - TTE - noted EF 35-45%  - cardio EP following  Per EP:  - Stress Test with fixed defects. No active ischemia  - No evidence of LV thrombus on TTE. No future Xarelto needed.   - For AICD today  - After ICD can start heart failure approved beta blocker.     2) Chronic systolic congestive heart failure: stable  - hold BB due to #1  - continue other heart meds, statin, Plavix aspirin and Vasotec    3) Apical Thrombosis.    - not show on TTE 5/12/18  - per cardio and EP no more A/C    4)  Coronary artery disease involving native coronary artery of native heart with unstable angina pectoris  - Cont asa, Crestor metoprolol, enalapril    5) HgA1C - 5.9 - Prediabetes   - diet , life style changes , follow up with PMD in 3 months .

## 2018-05-16 NOTE — DISCHARGE NOTE ADULT - MEDICATION SUMMARY - MEDICATIONS TO TAKE
I will START or STAY ON the medications listed below when I get home from the hospital:    spironolactone 25 mg oral tablet  -- 0.5 tab(s) by mouth once a day  -- Indication: For Chf    aspirin 81 mg oral delayed release tablet  -- 1 tab(s) by mouth once a day  -- Indication: For CAD (coronary artery disease)    oxyCODONE-acetaminophen 5 mg-325 mg oral tablet  -- 1 tab(s) by mouth every 6 hours, As Needed -Moderate Pain (4 - 6) MDD:4  -- Indication: For pain    enalapril 10 mg oral tablet  -- 1 tab(s) by mouth once a day  -- Indication: For Chf    isosorbide mononitrate 30 mg oral tablet, extended release  -- 1 tab(s) by mouth once a day  -- Indication: For Chf    Crestor 20 mg oral tablet  -- 1 tab(s) by mouth once a day (at bedtime)  -- Indication: For Hld    doxycycline monohydrate 100 mg oral capsule  -- 1 cap(s) by mouth every 12 hours  -- Indication: For antibiotic    Plavix 75 mg oral tablet  -- 1 tab(s) by mouth once a day  -- Indication: For CAD (coronary artery disease)    carvedilol 3.125 mg oral tablet  -- 1 tab(s) by mouth every 12 hours  -- Indication: For Chf

## 2018-05-16 NOTE — DISCHARGE NOTE ADULT - PATIENT PORTAL LINK FT
You can access the [email or web address], offered by [facility], by registering with the following website: [email or web address]

## 2018-05-16 NOTE — DISCHARGE NOTE ADULT - MEDICATION SUMMARY - MEDICATIONS TO STOP TAKING
I will STOP taking the medications listed below when I get home from the hospital:    Xarelto 20 mg oral tablet  -- 1 tab(s) by mouth once a day (in the evening)    metoprolol succinate 100 mg oral tablet, extended release  -- 1 tab(s) by mouth once a day

## 2018-05-16 NOTE — DISCHARGE NOTE ADULT - CARE PROVIDER_API CALL
David Crook  [address] [place] [address]  Cardiology Clinic 1st Floor  Phone: [phone]  Fax: [phone]

## 2018-05-16 NOTE — DISCHARGE NOTE ADULT - SECONDARY DIAGNOSIS.
Near syncope HTN (hypertension) Coronary artery disease involving native coronary artery of native heart with unstable angina pectoris

## 2018-05-16 NOTE — DISCHARGE NOTE ADULT - PLAN OF CARE
s/p dual chamber Medtronic ICD 1. You have a follow up for your cardiac device on May 29th at 1:30PM with Dr. Crook  2. Keep affected arm below shoulder, with no heavy lifting for 6 weeks.    3. Keep the site dry (no showers) for 48 hours.    4. Leave the sutures in place. They will be removed at your follow up appointment.   5. Please notify the physician if there is any bleeding, drainage or swelling of the site.    6. Please notify the physician of any fever greater than 100.4. s/p single chamber Medtronic ICD 1. You have a follow up for your cardiac device on May 29th at 1:30PM with Dr. Crook  2. Keep affected arm below shoulder, with no heavy lifting for 6 weeks.    3. Keep the site dry (no showers) for 48 hours.    4. Leave the sutures in place. They will be removed at your follow up appointment.   5. Please notify the physician if there is any bleeding, drainage or swelling of the site.    6. Please notify the physician of any fever greater than 100.4.  7. take heart meds as prescribed resolved take home meds

## 2018-05-16 NOTE — DISCHARGE NOTE ADULT - HOSPITAL COURSE
Pt is a 48 yo male with a pmh/o CAD s/p PCI in 2006 at [facility] and MI in 9/17 at [facility] with recanalization of RCA and LAD restenosis with a mid RCA PEPE placement, found to have EF 35% and apical thrombus however was not deemed candidate for life vest as pt w/o insurance at time and lives between [facility] and . Pt presented to ED after experiencing episode of dizziness, palpitations and feeling as if he were going to faint. Pt states he had an episode where he 'passed out' and lost consciousness in [facility] while working out but did not undergo evaluation at that time. Pt states compliance with med regimen asa, plavix, xarelto. Pt admitted with near syncope likely due to cardiac etiology in setting of severely reduced systolic chf and apical thrombus hx      1) Near syncope with bradycardia/ Mobitz 1 and EF 35%  - s/p AICD implant 5/16/18  - TTE - noted EF 35%  Per EP:  - Pt had Stress Test and showed fixed defects. No active ischemia  - No evidence of LV thrombus on TTE. No future Xarelto needed.   - S/P AICD 5/16/18  - Continue heart failure medications. Added Coreg 3.125mg PO BID  - Wound check in two weeks with Dr. rCook in 2 weeks time at [facility]  - s/p Ancef 2G IVPB x 2 doses followed by Doxycycline 100mg PO BID x 5 days post discharge  - cardio and EP cleared pt for discharge    2) Chronic systolic congestive heart failure (%): stable  per cardio:  - Start: Imdur 30mg daily and Spironolactone 12.5mg daily  - Continue Cardio Meds:  carvedilol 3.125 milliGRAM(s) Oral every 12 hours  enalapril 10 milliGRAM(s) Oral every 12 hours  rosuvastatin 20 milliGRAM(s) Oral at bedtime  aspirin enteric coated 81 milliGRAM(s) Oral daily  clopidogrel Tablet 75 milliGRAM(s) Oral daily  - follow up as outpatient     3) Apical Thrombosis.    - not show on TTE 5/12/18  - per cardio and EP no more A/C    4)  Coronary artery disease involving native coronary artery of native heart with unstable angina pectoris  - Cont heart meds as above    5) HgA1C - 5.9 - Prediabetes   - diet , life style changes , follow up with PMD in 3 months .    Pt cleared for discharge    total time spent for discharge: 40 minutes

## 2018-05-17 VITALS
RESPIRATION RATE: 17 BRPM | DIASTOLIC BLOOD PRESSURE: 92 MMHG | SYSTOLIC BLOOD PRESSURE: 144 MMHG | OXYGEN SATURATION: 99 % | TEMPERATURE: 99 F | HEART RATE: 88 BPM

## 2018-05-17 LAB
ANION GAP SERPL CALC-SCNC: 12 MMOL/L — SIGNIFICANT CHANGE UP (ref 5–17)
BASOPHILS # BLD AUTO: 0 K/UL — SIGNIFICANT CHANGE UP (ref 0–0.2)
BASOPHILS NFR BLD AUTO: 0.2 % — SIGNIFICANT CHANGE UP (ref 0–2)
BUN SERPL-MCNC: 14 MG/DL — SIGNIFICANT CHANGE UP (ref 8–20)
CALCIUM SERPL-MCNC: 8.9 MG/DL — SIGNIFICANT CHANGE UP (ref 8.6–10.2)
CHLORIDE SERPL-SCNC: 100 MMOL/L — SIGNIFICANT CHANGE UP (ref 98–107)
CO2 SERPL-SCNC: 28 MMOL/L — SIGNIFICANT CHANGE UP (ref 22–29)
CREAT SERPL-MCNC: 1.04 MG/DL — SIGNIFICANT CHANGE UP (ref 0.5–1.3)
EOSINOPHIL # BLD AUTO: 0.1 K/UL — SIGNIFICANT CHANGE UP (ref 0–0.5)
EOSINOPHIL NFR BLD AUTO: 1.5 % — SIGNIFICANT CHANGE UP (ref 0–5)
GLUCOSE SERPL-MCNC: 82 MG/DL — SIGNIFICANT CHANGE UP (ref 70–115)
HCT VFR BLD CALC: 50.7 % — SIGNIFICANT CHANGE UP (ref 42–52)
HGB BLD-MCNC: 16.9 G/DL — SIGNIFICANT CHANGE UP (ref 14–18)
LYMPHOCYTES # BLD AUTO: 1.9 K/UL — SIGNIFICANT CHANGE UP (ref 1–4.8)
LYMPHOCYTES # BLD AUTO: 29.1 % — SIGNIFICANT CHANGE UP (ref 20–55)
MAGNESIUM SERPL-MCNC: 2 MG/DL — SIGNIFICANT CHANGE UP (ref 1.6–2.6)
MCHC RBC-ENTMCNC: 29.1 PG — SIGNIFICANT CHANGE UP (ref 27–31)
MCHC RBC-ENTMCNC: 33.3 G/DL — SIGNIFICANT CHANGE UP (ref 32–36)
MCV RBC AUTO: 87.4 FL — SIGNIFICANT CHANGE UP (ref 80–94)
MONOCYTES # BLD AUTO: 0.7 K/UL — SIGNIFICANT CHANGE UP (ref 0–0.8)
MONOCYTES NFR BLD AUTO: 10.5 % — HIGH (ref 3–10)
NEUTROPHILS # BLD AUTO: 3.8 K/UL — SIGNIFICANT CHANGE UP (ref 1.8–8)
NEUTROPHILS NFR BLD AUTO: 58.5 % — SIGNIFICANT CHANGE UP (ref 37–73)
PLATELET # BLD AUTO: 175 K/UL — SIGNIFICANT CHANGE UP (ref 150–400)
POTASSIUM SERPL-MCNC: 4.2 MMOL/L — SIGNIFICANT CHANGE UP (ref 3.5–5.3)
POTASSIUM SERPL-SCNC: 4.2 MMOL/L — SIGNIFICANT CHANGE UP (ref 3.5–5.3)
RBC # BLD: 5.8 M/UL — SIGNIFICANT CHANGE UP (ref 4.6–6.2)
RBC # FLD: 12.7 % — SIGNIFICANT CHANGE UP (ref 11–15.6)
SODIUM SERPL-SCNC: 140 MMOL/L — SIGNIFICANT CHANGE UP (ref 135–145)
WBC # BLD: 6.5 K/UL — SIGNIFICANT CHANGE UP (ref 4.8–10.8)
WBC # FLD AUTO: 6.5 K/UL — SIGNIFICANT CHANGE UP (ref 4.8–10.8)

## 2018-05-17 PROCEDURE — 71046 X-RAY EXAM CHEST 2 VIEWS: CPT | Mod: 26

## 2018-05-17 PROCEDURE — 93010 ELECTROCARDIOGRAM REPORT: CPT

## 2018-05-17 PROCEDURE — 99239 HOSP IP/OBS DSCHRG MGMT >30: CPT

## 2018-05-17 PROCEDURE — 99233 SBSQ HOSP IP/OBS HIGH 50: CPT

## 2018-05-17 RX ORDER — SPIRONOLACTONE 25 MG/1
0.5 TABLET, FILM COATED ORAL
Qty: 15 | Refills: 0
Start: 2018-05-17 | End: 2018-06-15

## 2018-05-17 RX ORDER — ISOSORBIDE MONONITRATE 60 MG/1
1 TABLET, EXTENDED RELEASE ORAL
Qty: 30 | Refills: 0
Start: 2018-05-17 | End: 2018-06-15

## 2018-05-17 RX ORDER — CARVEDILOL PHOSPHATE 80 MG/1
1 CAPSULE, EXTENDED RELEASE ORAL
Qty: 60 | Refills: 0
Start: 2018-05-17 | End: 2018-06-15

## 2018-05-17 RX ORDER — ISOSORBIDE MONONITRATE 60 MG/1
30 TABLET, EXTENDED RELEASE ORAL DAILY
Qty: 0 | Refills: 0 | Status: DISCONTINUED | OUTPATIENT
Start: 2018-05-17 | End: 2018-05-17

## 2018-05-17 RX ORDER — RIVAROXABAN 15 MG-20MG
1 KIT ORAL
Qty: 0 | Refills: 0 | COMMUNITY

## 2018-05-17 RX ORDER — ASPIRIN/CALCIUM CARB/MAGNESIUM 324 MG
1 TABLET ORAL
Qty: 30 | Refills: 0
Start: 2018-05-17 | End: 2018-06-15

## 2018-05-17 RX ORDER — METOPROLOL TARTRATE 50 MG
1 TABLET ORAL
Qty: 0 | Refills: 0 | COMMUNITY

## 2018-05-17 RX ORDER — SPIRONOLACTONE 25 MG/1
12.5 TABLET, FILM COATED ORAL DAILY
Qty: 0 | Refills: 0 | Status: DISCONTINUED | OUTPATIENT
Start: 2018-05-17 | End: 2018-05-17

## 2018-05-17 RX ADMIN — Medication 2000 MILLIGRAM(S): at 12:45

## 2018-05-17 RX ADMIN — OXYCODONE AND ACETAMINOPHEN 1 TABLET(S): 5; 325 TABLET ORAL at 09:51

## 2018-05-17 RX ADMIN — Medication 100 MILLIGRAM(S): at 05:46

## 2018-05-17 RX ADMIN — Medication 2000 MILLIGRAM(S): at 01:03

## 2018-05-17 RX ADMIN — Medication 81 MILLIGRAM(S): at 09:51

## 2018-05-17 RX ADMIN — OXYCODONE AND ACETAMINOPHEN 1 TABLET(S): 5; 325 TABLET ORAL at 05:45

## 2018-05-17 RX ADMIN — CARVEDILOL PHOSPHATE 3.12 MILLIGRAM(S): 80 CAPSULE, EXTENDED RELEASE ORAL at 05:45

## 2018-05-17 RX ADMIN — OXYCODONE AND ACETAMINOPHEN 1 TABLET(S): 5; 325 TABLET ORAL at 10:30

## 2018-05-17 RX ADMIN — CLOPIDOGREL BISULFATE 75 MILLIGRAM(S): 75 TABLET, FILM COATED ORAL at 09:51

## 2018-05-17 RX ADMIN — Medication 10 MILLIGRAM(S): at 05:45

## 2018-05-17 NOTE — PROGRESS NOTE ADULT - SUBJECTIVE AND OBJECTIVE BOX
CARDIOLOGY PROGRESS NOTE   ([facility])                                                                                                        Subjective:     Vitals:  T(C): 36.8 (05-17-18 @ 05:41), Max: 36.8 (05-16-18 @ 10:16)  HR: 88 (05-17-18 @ 07:48) (64 - 88)  BP: 160/105 (05-17-18 @ 05:41) (138/88 - 164/97)  RR: 16 (05-17-18 @ 07:48) (16 - 115)  SpO2: 99% (05-17-18 @ 07:48) (95% - 99%)  Wt(kg): --  I&O's Summary    16 May 2018 07:01  -  17 May 2018 07:00  --------------------------------------------------------  IN: 240 mL / OUT: 1800 mL / NET: -1560 mL          PHYSICAL EXAM:  Appearance: Normal	  HEENT:   Atraumatic  Cardiovascular: Normal S1 S2, No JVD, No murmurs, No edema  Respiratory: Lungs clear to auscultation	  Gastrointestinal:  Soft, Non-tender, + BS	  Skin: No rashes, No ecchymoses, No cyanosis  Neurologic: Alert and awake, able to move extremities  Extremities: No edema    TELEMETRY: 	    ECG:  	      DIAGNOSTIC TESTING:  [ ] Echocardiogram:  [ ]  Catheterization:  [ ] Stress Test:    OTHER: 	      CURRENT MEDICATIONS:  carvedilol 3.125 milliGRAM(s) Oral every 12 hours  enalapril 10 milliGRAM(s) Oral every 12 hours    ceFAZolin  Injectable. 2000 milliGRAM(s) IV Push <User Schedule>  doxycycline hyclate Capsule 100 milliGRAM(s) Oral every 12 hours      acetaminophen   Tablet. 650 milliGRAM(s) Oral every 4 hours PRN  oxyCODONE    5 mG/acetaminophen 325 mG 1 Tablet(s) Oral every 4 hours PRN  traMADol 25 milliGRAM(s) Oral every 4 hours PRN      rosuvastatin 20 milliGRAM(s) Oral at bedtime    aspirin enteric coated 81 milliGRAM(s) Oral daily  clopidogrel Tablet 75 milliGRAM(s) Oral daily      LABS:	 	    CARDIAC MARKERS:  Troponin I:   CPK total =  CK MB=   CKMB index=                           16.9   6.5   )-----------( 175      ( 17 May 2018 06:28 )             50.7     05-17    140  |  100  |  14.0  ----------------------------<  82  4.2   |  28.0  |  1.04    Ca    8.9      17 May 2018 06:28  Mg     2.0     05-17      proBNP:   Lipid Profile: Date: 05-12 @ 06:01  Total cholesterol 138; Direct LDL: 84; HDL: 44; Triglycerides:48    HgA1c:   TSH: Thyroid Stimulating Hormone, Serum: 1.35 uIU/mL (05-12 @ 06:01) CARDIOLOGY PROGRESS NOTE   ([facility])                                                                                                        Subjective:     laying in bed. Comfortable. Denied CP, dyspnea.   Alert, awake.     Vitals:  T(C): 36.8 (05-17-18 @ 05:41), Max: 36.8 (05-16-18 @ 10:16)  HR: 88 (05-17-18 @ 07:48) (64 - 88)  BP: 160/105 (05-17-18 @ 05:41) (138/88 - 164/97)  RR: 16 (05-17-18 @ 07:48) (16 - 115)  SpO2: 99% (05-17-18 @ 07:48) (95% - 99%)  Wt(kg): --  I&O's Summary    16 May 2018 07:01  -  17 May 2018 07:00  --------------------------------------------------------  IN: 240 mL / OUT: 1800 mL / NET: -1560 mL          PHYSICAL EXAM:  Appearance: Normal	  HEENT:   Atraumatic  Cardiovascular: Normal S1 S2, No JVD, No murmurs, No edema  Respiratory: Lungs clear to auscultation	  Gastrointestinal:  Soft, Non-tender, + BS	  Skin: No rashes, No ecchymoses, No cyanosis  Neurologic: Alert and awake, able to move extremities  Extremities: No edema    TELEMETRY: SR, V-paced rhythm, brief NSVT	        CURRENT MEDICATIONS:  carvedilol 3.125 milliGRAM(s) Oral every 12 hours  enalapril 10 milliGRAM(s) Oral every 12 hours    ceFAZolin  Injectable. 2000 milliGRAM(s) IV Push <User Schedule>  doxycycline hyclate Capsule 100 milliGRAM(s) Oral every 12 hours      acetaminophen   Tablet. 650 milliGRAM(s) Oral every 4 hours PRN  oxyCODONE    5 mG/acetaminophen 325 mG 1 Tablet(s) Oral every 4 hours PRN  traMADol 25 milliGRAM(s) Oral every 4 hours PRN      rosuvastatin 20 milliGRAM(s) Oral at bedtime    aspirin enteric coated 81 milliGRAM(s) Oral daily  clopidogrel Tablet 75 milliGRAM(s) Oral daily      LABS:	 	                            16.9   6.5   )-----------( 175      ( 17 May 2018 06:28 )             50.7     05-17    140  |  100  |  14.0  ----------------------------<  82  4.2   |  28.0  |  1.04    Ca    8.9      17 May 2018 06:28  Mg     2.0     05-17      proBNP:   Lipid Profile: Date: 05-12 @ 06:01  Total cholesterol 138; Direct LDL: 84; HDL: 44; Triglycerides:48    HgA1c:   TSH: Thyroid Stimulating Hormone, Serum: 1.35 uIU/mL (05-12 @ 06:01)

## 2018-05-17 NOTE — PROGRESS NOTE ADULT - ASSESSMENT
48 yo male with significant cardiac history admitted with near syncope likely due to cardiac etiology in setting of severely reduced systolic chf and apical thrombus hx      1) Near syncope with bradycardia/SSS  - s/p AICD implant 5/16/18  - TTE - noted EF 35-45%  - cardio and EP cleared pt for discharge  Per EP:  - Stress Test with fixed defects. No active ischemia  - No evidence of LV thrombus on TTE. No future Xarelto needed.   - S/P AICD 5/16/18  - Continue heart failure medications. Added Coreg 3.125mg PO BID  - Wound check in two weeks with Dr. Crook in 2 weeks time at [facility]  - Ancef 2G IVPB x 2 doses followed by Doxycycline 100mg PO BID x 5 days    2) Chronic systolic congestive heart failure: stable  per cardio:  - Start: Imdur 30mg daily and Spironolactone 12.5mg daily  - Continue Cardio Meds:  carvedilol 3.125 milliGRAM(s) Oral every 12 hours  enalapril 10 milliGRAM(s) Oral every 12 hours  rosuvastatin 20 milliGRAM(s) Oral at bedtime  aspirin enteric coated 81 milliGRAM(s) Oral daily  clopidogrel Tablet 75 milliGRAM(s) Oral daily  - follow up as outpatient     3) Apical Thrombosis.    - not show on TTE 5/12/18  - per cardio and EP no more A/C    4)  Coronary artery disease involving native coronary artery of native heart with unstable angina pectoris  - Cont heart meds as above    5) HgA1C - 5.9 - Prediabetes   - diet , life style changes , follow up with PMD in 3 months .    Pt cleared for discharge 46 yo male with significant cardiac history admitted with near syncope likely due to cardiac etiology in setting of severely reduced systolic chf and apical thrombus hx      1) Near syncope with bradycardia/ Mobitz 1  - s/p AICD implant 5/16/18  - TTE - noted EF 35%  Per EP:  - Stress Test with fixed defects. No active ischemia  - No evidence of LV thrombus on TTE. No future Xarelto needed.   - S/P AICD 5/16/18  - Continue heart failure medications. Added Coreg 3.125mg PO BID  - Wound check in two weeks with Dr. Crook in 2 weeks time at [facility]  - Ancef 2G IVPB x 2 doses followed by Doxycycline 100mg PO BID x 5 days  - cardio and EP cleared pt for discharge    2) Chronic systolic congestive heart failure (%): stable  per cardio:  - Start: Imdur 30mg daily and Spironolactone 12.5mg daily  - Continue Cardio Meds:  carvedilol 3.125 milliGRAM(s) Oral every 12 hours  enalapril 10 milliGRAM(s) Oral every 12 hours  rosuvastatin 20 milliGRAM(s) Oral at bedtime  aspirin enteric coated 81 milliGRAM(s) Oral daily  clopidogrel Tablet 75 milliGRAM(s) Oral daily  - follow up as outpatient     3) Apical Thrombosis.    - not show on TTE 5/12/18  - per cardio and EP no more A/C    4)  Coronary artery disease involving native coronary artery of native heart with unstable angina pectoris  - Cont heart meds as above    5) HgA1C - 5.9 - Prediabetes   - diet , life style changes , follow up with PMD in 3 months .    Pt cleared for discharge

## 2018-05-17 NOTE — PROGRESS NOTE ADULT - SUBJECTIVE AND OBJECTIVE BOX
RICK LATHAM    [id number]    47y      Male    INTERVAL HPI/OVERNIGHT EVENTS:  Pt is a 46 yo male with a pmh/o CAD s/p PCI in 2006 at [facility] and MI in 9/17 at [facility] with recanalization of RCA and LAD restenosis with a mid RCA PEPE placement, found to have EF 35% and apical thrombus however was not deemed candidate for life vest as pt w/o insurance at time and lives between [facility] and . Pt presented to ED after experiencing episode of dizziness, palpitations and feeling as if he were going to faint. Pt states he had an episode where he 'passed out' and lost consciousness in [facility] while working out but did not undergo evaluation at that time. Pt states compliance with med regimen asa, plavix, xarelto. S/P pacemaker implant 5/16/18    today pt denies any sob no chest pain no new events overnight, pt pleased with care and wants to go home      REVIEW OF SYSTEMS:    CONSTITUTIONAL: No fever, weight loss, or fatigue  RESPIRATORY: No cough, wheezing, hemoptysis; No shortness of breath  CARDIOVASCULAR: No chest pain, palpitations  GASTROINTESTINAL: No abdominal or epigastric pain. No nausea, vomiting        Vital Signs Last 24 Hrs  T(C): 37.3 (17 May 2018 09:55), Max: 37.3 (17 May 2018 09:55)  T(F): 99.2 (17 May 2018 09:55), Max: 99.2 (17 May 2018 09:55)  HR: 88 (17 May 2018 09:55) (68 - 88)  BP: 144/92 (17 May 2018 09:55) (138/88 - 164/97)  BP(mean): --  RR: 17 (17 May 2018 09:55) (16 - 115)  SpO2: 99% (17 May 2018 09:55) (95% - 99%)    PHYSICAL EXAM:  GENERAL: NAD, well-groomed  HEENT: PERRL, +EOMI  CHEST/LUNG: Clear to auscultation bilaterally; No wheezing, pacemaker on left chest wall wrapped in gauze  HEART: S1S2+, Regular rate and rhythm; No murmurs  ABDOMEN: Soft, Nontender, Nondistended; Bowel sounds present  EXTREMITIES:  2+ Peripheral Pulses, No clubbing, cyanosis, or edema        LABS:                        16.9   6.5   )-----------( 175      ( 17 May 2018 06:28 )             50.7     05-17    140  |  100  |  14.0  ----------------------------<  82  4.2   |  28.0  |  1.04    Ca    8.9      17 May 2018 06:28  Mg     2.0     05-17        MEDICATIONS  (STANDING):  aspirin enteric coated 81 milliGRAM(s) Oral daily  carvedilol 3.125 milliGRAM(s) Oral every 12 hours  ceFAZolin  Injectable. 2000 milliGRAM(s) IV Push <User Schedule>  clopidogrel Tablet 75 milliGRAM(s) Oral daily  doxycycline hyclate Capsule 100 milliGRAM(s) Oral every 12 hours  enalapril 10 milliGRAM(s) Oral every 12 hours  isosorbide   mononitrate ER Tablet (IMDUR) 30 milliGRAM(s) Oral daily  rosuvastatin 20 milliGRAM(s) Oral at bedtime  spironolactone 12.5 milliGRAM(s) Oral daily    MEDICATIONS  (PRN):  acetaminophen   Tablet. 650 milliGRAM(s) Oral every 4 hours PRN Mild Pain (1 - 3)  oxyCODONE    5 mG/acetaminophen 325 mG 1 Tablet(s) Oral every 4 hours PRN Moderate Pain (4 - 6)  traMADol 25 milliGRAM(s) Oral every 4 hours PRN Severe Pain (7 - 10)

## 2018-05-17 NOTE — PROGRESS NOTE ADULT - SUBJECTIVE AND OBJECTIVE BOX
Patient seen today in bed. No acute post operative complaints. Device interrogation performed. Excellent device function noted.   CXR: Excellent lead position.     EKG: pending.     MEDICATIONS  (STANDING):  aspirin enteric coated 81 milliGRAM(s) Oral daily  carvedilol 3.125 milliGRAM(s) Oral every 12 hours  ceFAZolin  Injectable. 2000 milliGRAM(s) IV Push <User Schedule>  clopidogrel Tablet 75 milliGRAM(s) Oral daily  doxycycline hyclate Capsule 100 milliGRAM(s) Oral every 12 hours  enalapril 10 milliGRAM(s) Oral every 12 hours  isosorbide   mononitrate ER Tablet (IMDUR) 30 milliGRAM(s) Oral daily  rosuvastatin 20 milliGRAM(s) Oral at bedtime  spironolactone 12.5 milliGRAM(s) Oral daily    MEDICATIONS  (PRN):  acetaminophen   Tablet. 650 milliGRAM(s) Oral every 4 hours PRN Mild Pain (1 - 3)  oxyCODONE    5 mG/acetaminophen 325 mG 1 Tablet(s) Oral every 4 hours PRN Moderate Pain (4 - 6)  traMADol 25 milliGRAM(s) Oral every 4 hours PRN Severe Pain (7 - 10)    Allergies  No Known Allergies    Intolerances    PAST MEDICAL & SURGICAL HISTORY:  CAD (coronary artery disease)  Stented coronary artery  Thrombosis: apical  MI (myocardial infarction): 2 coronary stents  HTN (hypertension)  No significant past surgical history    Vital Signs Last 24 Hrs  T(C): 36.8 (17 May 2018 05:41), Max: 36.8 (16 May 2018 10:16)  T(F): 98.3 (17 May 2018 05:41), Max: 98.3 (17 May 2018 05:41)  HR: 88 (17 May 2018 07:48) (68 - 88)  BP: 160/105 (17 May 2018 05:41) (138/88 - 164/97)  RR: 16 (17 May 2018 07:48) (16 - 115)  SpO2: 99% (17 May 2018 07:48) (95% - 99%)    Physical Exam:  Left Chest Wall: No hematoma. Excellent wound healing noted.     LABS:                        16.9   6.5   )-----------( 175      ( 17 May 2018 06:28 )             50.7     05-17    140  |  100  |  14.0  ----------------------------<  82  4.2   |  28.0  |  1.04    Ca    8.9      17 May 2018 06:28  Mg     2.0     05-17    A/P  47 year old male patient with a history of CAD s/p MI, PCI to RCA, EF 35-40%, narrow QRS who is now s/p Medtronic single chamber ICD.     - Discharge planning ok from EP perspective today.

## 2018-05-17 NOTE — PROGRESS NOTE ADULT - ASSESSMENT
48y/o male with PMH of hypertension, Hyperlipidemia, CAD, stent 2006, [facility] 9/2017 RCA and LAD restenosis with a mid RCA PEPE placed,  Ef 35% with apical thrombus admitted for near syncope.   Had a previous syncopal event while in [place],. Telemetry monitoring episodes of prolonged pauses.   Echo showed EF 35-40%, NST done which showed Infarct with minimal austin-infarct ischemia. He had ICD placement,  now s/p Medtronic dual chamber ICD.       s/p Medtronic dual chamber ICD.     PAMELA: As per Pulm    HTN: Stable.     CAD: CP at times.  Add nitrates.         Start:  Imdur 30mg daily  Spironolactone 12.5mg daily    Continue Cardio Meds:  carvedilol 3.125 milliGRAM(s) Oral every 12 hours  enalapril 10 milliGRAM(s) Oral every 12 hours  rosuvastatin 20 milliGRAM(s) Oral at bedtime  aspirin enteric coated 81 milliGRAM(s) Oral daily  clopidogrel Tablet 75 milliGRAM(s) Oral daily        d/c planning, d/w Dr. Vallecillo

## 2018-05-23 PROCEDURE — 85610 PROTHROMBIN TIME: CPT

## 2018-05-23 PROCEDURE — 99285 EMERGENCY DEPT VISIT HI MDM: CPT | Mod: 25

## 2018-05-23 PROCEDURE — C1722: CPT

## 2018-05-23 PROCEDURE — 71045 X-RAY EXAM CHEST 1 VIEW: CPT

## 2018-05-23 PROCEDURE — 93306 TTE W/DOPPLER COMPLETE: CPT

## 2018-05-23 PROCEDURE — 93880 EXTRACRANIAL BILAT STUDY: CPT

## 2018-05-23 PROCEDURE — 93005 ELECTROCARDIOGRAM TRACING: CPT

## 2018-05-23 PROCEDURE — 85730 THROMBOPLASTIN TIME PARTIAL: CPT

## 2018-05-23 PROCEDURE — 94660 CPAP INITIATION&MGMT: CPT

## 2018-05-23 PROCEDURE — 83880 ASSAY OF NATRIURETIC PEPTIDE: CPT

## 2018-05-23 PROCEDURE — 84100 ASSAY OF PHOSPHORUS: CPT

## 2018-05-23 PROCEDURE — 71046 X-RAY EXAM CHEST 2 VIEWS: CPT

## 2018-05-23 PROCEDURE — 83036 HEMOGLOBIN GLYCOSYLATED A1C: CPT

## 2018-05-23 PROCEDURE — C1777: CPT

## 2018-05-23 PROCEDURE — 78452 HT MUSCLE IMAGE SPECT MULT: CPT

## 2018-05-23 PROCEDURE — 80061 LIPID PANEL: CPT

## 2018-05-23 PROCEDURE — 85027 COMPLETE CBC AUTOMATED: CPT

## 2018-05-23 PROCEDURE — A9500: CPT

## 2018-05-23 PROCEDURE — 83735 ASSAY OF MAGNESIUM: CPT

## 2018-05-23 PROCEDURE — 33240 INSRT PULSE GEN W/SINGL LEAD: CPT

## 2018-05-23 PROCEDURE — 84484 ASSAY OF TROPONIN QUANT: CPT

## 2018-05-23 PROCEDURE — 80048 BASIC METABOLIC PNL TOTAL CA: CPT

## 2018-05-23 PROCEDURE — 33249 INSJ/RPLCMT DEFIB W/LEAD(S): CPT

## 2018-05-23 PROCEDURE — 93017 CV STRESS TEST TRACING ONLY: CPT

## 2018-05-23 PROCEDURE — 81003 URINALYSIS AUTO W/O SCOPE: CPT

## 2018-05-23 PROCEDURE — 84443 ASSAY THYROID STIM HORMONE: CPT

## 2018-05-23 PROCEDURE — 80307 DRUG TEST PRSMV CHEM ANLYZR: CPT

## 2018-05-23 PROCEDURE — 70450 CT HEAD/BRAIN W/O DYE: CPT

## 2018-05-23 PROCEDURE — 94762 N-INVAS EAR/PLS OXIMTRY CONT: CPT

## 2018-05-23 PROCEDURE — 36415 COLL VENOUS BLD VENIPUNCTURE: CPT

## 2018-05-23 PROCEDURE — 80053 COMPREHEN METABOLIC PANEL: CPT

## 2019-11-19 ENCOUNTER — TRANSCRIPTION ENCOUNTER (OUTPATIENT)
Age: 49
End: 2019-11-19

## 2019-11-19 ENCOUNTER — INPATIENT (INPATIENT)
Facility: HOSPITAL | Age: 49
LOS: 0 days | Discharge: HOME | DRG: 247 | End: 2019-11-20
Attending: INTERNAL MEDICINE | Admitting: INTERNAL MEDICINE
Payer: MEDICAID

## 2019-11-19 VITALS
HEART RATE: 66 BPM | RESPIRATION RATE: 17 BRPM | SYSTOLIC BLOOD PRESSURE: 135 MMHG | OXYGEN SATURATION: 98 % | TEMPERATURE: 98 F | DIASTOLIC BLOOD PRESSURE: 98 MMHG

## 2019-11-19 DIAGNOSIS — R07.9 CHEST PAIN, UNSPECIFIED: ICD-10-CM

## 2019-11-19 DIAGNOSIS — Z95.810 PRESENCE OF AUTOMATIC (IMPLANTABLE) CARDIAC DEFIBRILLATOR: Chronic | ICD-10-CM

## 2019-11-19 PROBLEM — I82.90 ACUTE EMBOLISM AND THROMBOSIS OF UNSPECIFIED VEIN: Chronic | Status: ACTIVE | Noted: 2018-05-11

## 2019-11-19 PROBLEM — I10 ESSENTIAL (PRIMARY) HYPERTENSION: Chronic | Status: ACTIVE | Noted: 2018-05-11

## 2019-11-19 PROBLEM — Z95.5 PRESENCE OF CORONARY ANGIOPLASTY IMPLANT AND GRAFT: Chronic | Status: ACTIVE | Noted: 2018-05-11

## 2019-11-19 PROBLEM — I25.10 ATHEROSCLEROTIC HEART DISEASE OF NATIVE CORONARY ARTERY WITHOUT ANGINA PECTORIS: Chronic | Status: ACTIVE | Noted: 2018-05-11

## 2019-11-19 LAB
ALBUMIN SERPL ELPH-MCNC: 4.6 G/DL — SIGNIFICANT CHANGE UP (ref 3.3–5)
ALP SERPL-CCNC: 57 U/L — SIGNIFICANT CHANGE UP (ref 40–120)
ALT FLD-CCNC: 18 U/L — SIGNIFICANT CHANGE UP (ref 10–45)
ANION GAP SERPL CALC-SCNC: 13 MMOL/L — SIGNIFICANT CHANGE UP (ref 5–17)
APTT BLD: 35.7 SEC — SIGNIFICANT CHANGE UP (ref 27.5–36.3)
AST SERPL-CCNC: 21 U/L — SIGNIFICANT CHANGE UP (ref 10–40)
BILIRUB SERPL-MCNC: 0.8 MG/DL — SIGNIFICANT CHANGE UP (ref 0.2–1.2)
BUN SERPL-MCNC: 10 MG/DL — SIGNIFICANT CHANGE UP (ref 7–23)
CALCIUM SERPL-MCNC: 9.4 MG/DL — SIGNIFICANT CHANGE UP (ref 8.4–10.5)
CHLORIDE SERPL-SCNC: 102 MMOL/L — SIGNIFICANT CHANGE UP (ref 96–108)
CO2 SERPL-SCNC: 27 MMOL/L — SIGNIFICANT CHANGE UP (ref 22–31)
CREAT SERPL-MCNC: 0.91 MG/DL — SIGNIFICANT CHANGE UP (ref 0.5–1.3)
GLUCOSE SERPL-MCNC: 79 MG/DL — SIGNIFICANT CHANGE UP (ref 70–99)
HCT VFR BLD CALC: 46.5 % — SIGNIFICANT CHANGE UP (ref 39–50)
HGB BLD-MCNC: 15.5 G/DL — SIGNIFICANT CHANGE UP (ref 13–17)
INR BLD: 1.36 RATIO — HIGH (ref 0.88–1.16)
MCHC RBC-ENTMCNC: 29.2 PG — SIGNIFICANT CHANGE UP (ref 27–34)
MCHC RBC-ENTMCNC: 33.3 GM/DL — SIGNIFICANT CHANGE UP (ref 32–36)
MCV RBC AUTO: 87.7 FL — SIGNIFICANT CHANGE UP (ref 80–100)
NRBC # BLD: 0 /100 WBCS — SIGNIFICANT CHANGE UP (ref 0–0)
PLATELET # BLD AUTO: 181 K/UL — SIGNIFICANT CHANGE UP (ref 150–400)
POTASSIUM SERPL-MCNC: 3.9 MMOL/L — SIGNIFICANT CHANGE UP (ref 3.5–5.3)
POTASSIUM SERPL-SCNC: 3.9 MMOL/L — SIGNIFICANT CHANGE UP (ref 3.5–5.3)
PROT SERPL-MCNC: 7.4 G/DL — SIGNIFICANT CHANGE UP (ref 6–8.3)
PROTHROM AB SERPL-ACNC: 15.6 SEC — HIGH (ref 10–12.9)
RBC # BLD: 5.3 M/UL — SIGNIFICANT CHANGE UP (ref 4.2–5.8)
RBC # FLD: 13.2 % — SIGNIFICANT CHANGE UP (ref 10.3–14.5)
SODIUM SERPL-SCNC: 142 MMOL/L — SIGNIFICANT CHANGE UP (ref 135–145)
WBC # BLD: 5.38 K/UL — SIGNIFICANT CHANGE UP (ref 3.8–10.5)
WBC # FLD AUTO: 5.38 K/UL — SIGNIFICANT CHANGE UP (ref 3.8–10.5)

## 2019-11-19 PROCEDURE — 99254 IP/OBS CNSLTJ NEW/EST MOD 60: CPT | Mod: 25

## 2019-11-19 PROCEDURE — 92928 PRQ TCAT PLMT NTRAC ST 1 LES: CPT | Mod: LC

## 2019-11-19 PROCEDURE — 93010 ELECTROCARDIOGRAM REPORT: CPT

## 2019-11-19 PROCEDURE — 93458 L HRT ARTERY/VENTRICLE ANGIO: CPT | Mod: 26

## 2019-11-19 PROCEDURE — 99152 MOD SED SAME PHYS/QHP 5/>YRS: CPT

## 2019-11-19 RX ORDER — LISINOPRIL 2.5 MG/1
20 TABLET ORAL DAILY
Refills: 0 | Status: DISCONTINUED | OUTPATIENT
Start: 2019-11-19 | End: 2019-11-20

## 2019-11-19 RX ORDER — ROSUVASTATIN CALCIUM 5 MG/1
1 TABLET ORAL
Qty: 0 | Refills: 0 | DISCHARGE

## 2019-11-19 RX ORDER — ATORVASTATIN CALCIUM 80 MG/1
80 TABLET, FILM COATED ORAL AT BEDTIME
Refills: 0 | Status: DISCONTINUED | OUTPATIENT
Start: 2019-11-19 | End: 2019-11-20

## 2019-11-19 RX ORDER — LISINOPRIL 2.5 MG/1
1 TABLET ORAL
Qty: 0 | Refills: 0 | DISCHARGE

## 2019-11-19 RX ORDER — CLOPIDOGREL BISULFATE 75 MG/1
75 TABLET, FILM COATED ORAL DAILY
Refills: 0 | Status: DISCONTINUED | OUTPATIENT
Start: 2019-11-20 | End: 2019-11-20

## 2019-11-19 RX ORDER — SODIUM CHLORIDE 9 MG/ML
3 INJECTION INTRAMUSCULAR; INTRAVENOUS; SUBCUTANEOUS EVERY 8 HOURS
Refills: 0 | Status: DISCONTINUED | OUTPATIENT
Start: 2019-11-19 | End: 2019-11-20

## 2019-11-19 RX ORDER — CARVEDILOL PHOSPHATE 80 MG/1
12.5 CAPSULE, EXTENDED RELEASE ORAL EVERY 12 HOURS
Refills: 0 | Status: DISCONTINUED | OUTPATIENT
Start: 2019-11-19 | End: 2019-11-20

## 2019-11-19 RX ORDER — ASPIRIN/CALCIUM CARB/MAGNESIUM 324 MG
81 TABLET ORAL DAILY
Refills: 0 | Status: DISCONTINUED | OUTPATIENT
Start: 2019-11-20 | End: 2019-11-20

## 2019-11-19 RX ORDER — ISOSORBIDE MONONITRATE 60 MG/1
30 TABLET, EXTENDED RELEASE ORAL DAILY
Refills: 0 | Status: DISCONTINUED | OUTPATIENT
Start: 2019-11-19 | End: 2019-11-20

## 2019-11-19 RX ORDER — GABAPENTIN 400 MG/1
100 CAPSULE ORAL AT BEDTIME
Refills: 0 | Status: DISCONTINUED | OUTPATIENT
Start: 2019-11-19 | End: 2019-11-20

## 2019-11-19 RX ADMIN — SODIUM CHLORIDE 3 MILLILITER(S): 9 INJECTION INTRAMUSCULAR; INTRAVENOUS; SUBCUTANEOUS at 21:02

## 2019-11-19 RX ADMIN — GABAPENTIN 100 MILLIGRAM(S): 400 CAPSULE ORAL at 21:04

## 2019-11-19 RX ADMIN — CARVEDILOL PHOSPHATE 12.5 MILLIGRAM(S): 80 CAPSULE, EXTENDED RELEASE ORAL at 18:34

## 2019-11-19 RX ADMIN — ATORVASTATIN CALCIUM 80 MILLIGRAM(S): 80 TABLET, FILM COATED ORAL at 21:04

## 2019-11-19 NOTE — H&P CARDIOLOGY - PSH
Cardiac defibrillator in place  2017 Cardiac defibrillator in place  2017  Medtronic Visa AF MRI VR Surescan ICD   Model# [id number], SN# [id number]

## 2019-11-19 NOTE — H&P CARDIOLOGY - HISTORY OF PRESENT ILLNESS
48 yo AA male with PMH CAD with multiple stents, MI in 9/2017 at [facility] with recanalization of RCA and LAD restenosis with a mid RCA PEPE placement, apical thrombus in 2017 (on coumadin),Chronic systolic congestive heart failure, hx of syncope and 2:1 HB with bradycardia with left sided dual chamber AICD (DDD ) in May 2018 [facility] (Dr. Crook [place]), CVA with right sided weakness transferred today from [facility] for staged PCI to Cx. Patient went to [facility] for elective cardiac angiogram today with c/o midsternal chest tightness with and without exertion x 2-3 months. Patient evaluated at [facility] recommended to have angiogram fir further ischemic evalaution. Cath was done via RRA. Here today for PCI to Cx.          however was not deemed candidate for life vest as pt w/o insurance at time and lives between [place] and . Pt today presents to ED after experiencing episode of dizziness, palpitations and feeling as if he were going to faint. Pt states he had an episode where he 'passed out' and lost consciousness in [place] while working out but did not undergo evaluation at that time. Pt denies any cp, diaphoresis, n/v/d, gu sx, gi sx, fever, cough, leg swelling. Pt states compliance with med regimen asa, plavix, xarelto. 48 yo AA male with PMH CAD with multiple stents to RCA and LAD, MI in 9/2017 at [facility] with recanalization of RCA and LAD restenosis with a mid RCA PEPE placement, apical thrombus in 2017 (on coumadin),Chronic systolic congestive heart failure (5/2018 EF 35-40%), hx of syncope and 2:1 HB with bradycardia with left sided dual chamber AICD (DDD ) in May 2018 [facility] (Dr. Crook [place]), CVA with right sided weakness transferred today from [facility] for staged PCI to Cx. Patient went to [facility] for elective cardiac angiogram today with c/o midsternal chest tightness with and without exertion x 2-3 months. Patient evaluated at [facility] recommended to have angiogram fir further ischemic evalaution. Cath was done via RRA. Here today for PCI to Cx.          however was not deemed candidate for life vest as pt w/o insurance at time and lives between [facility] and . Pt today presents to ED after experiencing episode of dizziness, palpitations and feeling as if he were going to faint. Pt states he had an episode where he 'passed out' and lost consciousness in [facility] while working out but did not undergo evaluation at that time. Pt denies any cp, diaphoresis, n/v/d, gu sx, gi sx, fever, cough, leg swelling. Pt states compliance with med regimen asa, plavix, xarelto. 48 yo AA male with PMH CAD with multiple stents to RCA and LAD, MI in 9/2017 at [place] with recanalization of RCA and LAD restenosis with a mid RCA PEPE placement, apical thrombus in 2017 (on coumadin),Chronic systolic congestive heart failure (5/2018 EF 35-40%), hx of syncope and 2:1 HB with bradycardia with left sided dual chamber AICD (DDD ) in May 2018 [facility] (Dr. Crook [place]), CVA with right sided weakness transferred today from [facility] for staged PCI to Cx. Patient went to [facility] for elective cardiac angiogram today with c/o midsternal chest tightness with and without exertion x 2-3 months. Patient evaluated at [facility] recommended to have angiogram fir further ischemic evalaution. Cath was done via RRA. Here today for PCI to Cx. 48 yo AA male with PMH CAD with multiple stents to RCA and LAD, MI in 9/2017 at [facility] with recanalization of RCA and LAD restenosis with a mid RCA PEPE placement, apical thrombus in 2017 (on coumadin),Chronic systolic congestive heart failure (5/2018 EF 35-40%), hx of syncope and 2:1 HB with bradycardia with left sided dual chamber AICD (DDD ) in May [facility] CVA with right sided weakness (approx June 2019, date unsure, pt. presented with symptoms to cardiologist office upon return from [facility]) transferred today from [facility] for staged PCI to Cx. Patient went to [facility] for elective cardiac angiogram today with c/o midsternal chest tightness with and without exertion x 2-3 months. Patient evaluated by Dr. Crook recommended to have angiogram fir further ischemic evaluation Cath was done via RRA. Here today for PCI to Cx.

## 2019-11-19 NOTE — CHART NOTE - NSCHARTNOTEFT_GEN_A_CORE
Removal of Femoral Sheath (08:18pm)    Pulses in the right lower extremity are palpable. The patient was placed in the supine position. The insertion site was identified and the sutures were removed per protocol.  The 6____ Swiss femoral sheath was then removed. Direct pressure was applied for  __20____ minutes.     Monitoring of the right groin and both lower extremities including neuro-vascular checks and vital signs every 15 minutes x 4, then every 30 minutes x 2, then every 1 hour was ordered.    Gauze and Tegaderm dressing placed. Patient instructed to keep extremity straight and that nursing staff will inform them when they can sit up.     Complications:     Comments: clean gauze tegaderm dressing applied    PATRICIA Bueno  Invasive Cardiology  [phone]

## 2019-11-19 NOTE — H&P CARDIOLOGY - PMH
CAD (coronary artery disease)    Former smoker    Heart block  2:1 HB   dual chamber AICD (DDD ) in May 2018 [facility] (David Mahmood)  HTN (hypertension)    MI (myocardial infarction)  2 coronary stents  Stented coronary artery    Thrombosis  apical

## 2019-11-20 ENCOUNTER — TRANSCRIPTION ENCOUNTER (OUTPATIENT)
Age: 49
End: 2019-11-20

## 2019-11-20 VITALS
SYSTOLIC BLOOD PRESSURE: 154 MMHG | DIASTOLIC BLOOD PRESSURE: 70 MMHG | OXYGEN SATURATION: 98 % | TEMPERATURE: 98 F | RESPIRATION RATE: 16 BRPM | HEART RATE: 67 BPM

## 2019-11-20 DIAGNOSIS — I10 ESSENTIAL (PRIMARY) HYPERTENSION: ICD-10-CM

## 2019-11-20 DIAGNOSIS — I25.110 ATHEROSCLEROTIC HEART DISEASE OF NATIVE CORONARY ARTERY WITH UNSTABLE ANGINA PECTORIS: ICD-10-CM

## 2019-11-20 DIAGNOSIS — I63.9 CEREBRAL INFARCTION, UNSPECIFIED: ICD-10-CM

## 2019-11-20 LAB
ANION GAP SERPL CALC-SCNC: 13 MMOL/L — SIGNIFICANT CHANGE UP (ref 5–17)
BUN SERPL-MCNC: 16 MG/DL — SIGNIFICANT CHANGE UP (ref 7–23)
CALCIUM SERPL-MCNC: 9 MG/DL — SIGNIFICANT CHANGE UP (ref 8.4–10.5)
CHLORIDE SERPL-SCNC: 102 MMOL/L — SIGNIFICANT CHANGE UP (ref 96–108)
CO2 SERPL-SCNC: 25 MMOL/L — SIGNIFICANT CHANGE UP (ref 22–31)
CREAT SERPL-MCNC: 0.86 MG/DL — SIGNIFICANT CHANGE UP (ref 0.5–1.3)
GLUCOSE SERPL-MCNC: 103 MG/DL — HIGH (ref 70–99)
HCT VFR BLD CALC: 43 % — SIGNIFICANT CHANGE UP (ref 39–50)
HGB BLD-MCNC: 14.1 G/DL — SIGNIFICANT CHANGE UP (ref 13–17)
INR BLD: 1.3 RATIO — HIGH (ref 0.88–1.16)
MCHC RBC-ENTMCNC: 28.8 PG — SIGNIFICANT CHANGE UP (ref 27–34)
MCHC RBC-ENTMCNC: 32.8 GM/DL — SIGNIFICANT CHANGE UP (ref 32–36)
MCV RBC AUTO: 87.9 FL — SIGNIFICANT CHANGE UP (ref 80–100)
NRBC # BLD: 0 /100 WBCS — SIGNIFICANT CHANGE UP (ref 0–0)
PLATELET # BLD AUTO: 176 K/UL — SIGNIFICANT CHANGE UP (ref 150–400)
POTASSIUM SERPL-MCNC: 3.6 MMOL/L — SIGNIFICANT CHANGE UP (ref 3.5–5.3)
POTASSIUM SERPL-SCNC: 3.6 MMOL/L — SIGNIFICANT CHANGE UP (ref 3.5–5.3)
PROTHROM AB SERPL-ACNC: 14.9 SEC — HIGH (ref 10–12.9)
RBC # BLD: 4.89 M/UL — SIGNIFICANT CHANGE UP (ref 4.2–5.8)
RBC # FLD: 13.1 % — SIGNIFICANT CHANGE UP (ref 10.3–14.5)
SODIUM SERPL-SCNC: 140 MMOL/L — SIGNIFICANT CHANGE UP (ref 135–145)
WBC # BLD: 6.28 K/UL — SIGNIFICANT CHANGE UP (ref 3.8–10.5)
WBC # FLD AUTO: 6.28 K/UL — SIGNIFICANT CHANGE UP (ref 3.8–10.5)

## 2019-11-20 PROCEDURE — C1894: CPT

## 2019-11-20 PROCEDURE — 99238 HOSP IP/OBS DSCHRG MGMT 30/<: CPT

## 2019-11-20 PROCEDURE — 99152 MOD SED SAME PHYS/QHP 5/>YRS: CPT

## 2019-11-20 PROCEDURE — C1769: CPT

## 2019-11-20 PROCEDURE — 85027 COMPLETE CBC AUTOMATED: CPT

## 2019-11-20 PROCEDURE — 80048 BASIC METABOLIC PNL TOTAL CA: CPT

## 2019-11-20 PROCEDURE — 80053 COMPREHEN METABOLIC PANEL: CPT

## 2019-11-20 PROCEDURE — C1887: CPT

## 2019-11-20 PROCEDURE — C9600: CPT | Mod: LC

## 2019-11-20 PROCEDURE — 93005 ELECTROCARDIOGRAM TRACING: CPT

## 2019-11-20 PROCEDURE — 85610 PROTHROMBIN TIME: CPT

## 2019-11-20 PROCEDURE — 93306 TTE W/DOPPLER COMPLETE: CPT | Mod: 26

## 2019-11-20 PROCEDURE — 85730 THROMBOPLASTIN TIME PARTIAL: CPT

## 2019-11-20 PROCEDURE — C8929: CPT

## 2019-11-20 PROCEDURE — C1874: CPT

## 2019-11-20 RX ORDER — CLOPIDOGREL BISULFATE 75 MG/1
1 TABLET, FILM COATED ORAL
Qty: 0 | Refills: 0 | DISCHARGE

## 2019-11-20 RX ORDER — WARFARIN SODIUM 2.5 MG/1
1 TABLET ORAL
Qty: 0 | Refills: 0 | DISCHARGE
Start: 2019-11-20

## 2019-11-20 RX ORDER — WARFARIN SODIUM 2.5 MG/1
1 TABLET ORAL
Qty: 0 | Refills: 0 | DISCHARGE

## 2019-11-20 RX ORDER — CLOPIDOGREL BISULFATE 75 MG/1
1 TABLET, FILM COATED ORAL
Qty: 0 | Refills: 0 | DISCHARGE
Start: 2019-11-20

## 2019-11-20 RX ORDER — ASPIRIN/CALCIUM CARB/MAGNESIUM 324 MG
1 TABLET ORAL
Qty: 0 | Refills: 0 | DISCHARGE
Start: 2019-11-20

## 2019-11-20 RX ORDER — CLOPIDOGREL BISULFATE 75 MG/1
1 TABLET, FILM COATED ORAL
Qty: 90 | Refills: 3
Start: 2019-11-20 | End: 2020-11-13

## 2019-11-20 RX ADMIN — CARVEDILOL PHOSPHATE 12.5 MILLIGRAM(S): 80 CAPSULE, EXTENDED RELEASE ORAL at 05:58

## 2019-11-20 RX ADMIN — LISINOPRIL 20 MILLIGRAM(S): 2.5 TABLET ORAL at 05:58

## 2019-11-20 RX ADMIN — SODIUM CHLORIDE 3 MILLILITER(S): 9 INJECTION INTRAMUSCULAR; INTRAVENOUS; SUBCUTANEOUS at 05:34

## 2019-11-20 RX ADMIN — SODIUM CHLORIDE 3 MILLILITER(S): 9 INJECTION INTRAMUSCULAR; INTRAVENOUS; SUBCUTANEOUS at 16:18

## 2019-11-20 RX ADMIN — ISOSORBIDE MONONITRATE 30 MILLIGRAM(S): 60 TABLET, EXTENDED RELEASE ORAL at 05:58

## 2019-11-20 RX ADMIN — Medication 81 MILLIGRAM(S): at 05:59

## 2019-11-20 RX ADMIN — CLOPIDOGREL BISULFATE 75 MILLIGRAM(S): 75 TABLET, FILM COATED ORAL at 05:59

## 2019-11-20 NOTE — DISCHARGE NOTE PROVIDER - NSDCCPTREATMENT_GEN_ALL_CORE_FT
PRINCIPAL PROCEDURE  Procedure: Coronary stent placement  Findings and Treatment: one stent to left circumflex artery via right femoral artery PRINCIPAL PROCEDURE  Procedure: Coronary stent placement  Findings and Treatment: one stent to left circumflex artery via right femoral artery No heavy lifting for 2 weeks, no strenuous activity  or uneccesary stair climbing, no driving for x 2 days,  you may shower 24 hours following procedure but no bathing or swimming for x1  week, no bending, no straining while having a Bowel movement, No strenuous sexual activity for x 1 week check groin for bleeding, pain, tightness or ( golf ball size)  swelling daily following procedure , & follow up with your cardiologist in 1-2 week

## 2019-11-20 NOTE — PROGRESS NOTE ADULT - SUBJECTIVE AND OBJECTIVE BOX
50 yo AA male with PMH CAD with multiple stents to RCA and LAD, [place] [facility] with recanalization of RCA and LAD restenosis with a mid RCA PEPE placement, apical thrombus in 2017 (on coumadin),Chronic systolic congestive heart failure (5/2018 EF 35-40%), hx of syncope and 2:1 HB with bradycardia with left sided dual chamber AICD (DDD ) [facility] CVA with right sided weakness (approx June 2019, date unsure, pt. presented with symptoms to cardiologist office upon return from [facility]) transferred today from [facility] for staged PCI to Cx. Patient went to [facility] for elective cardiac angiogram today with c/o midsternal chest tightness with and without exertion x 2-3 months. Patient evaluated by Dr. Crook recommended to have angiogram fir further ischemic evaluation Cath was done via RRA.  Patient now s/p PEPE x1 to LCx via RFA    Subjective/Observations: patient remains comfortable and offers no complaints      REVIEW OF SYSTEMS: All other review of systems is negative unless indicated above    MEDICATIONS  (STANDING):  aspirin enteric coated 81 milliGRAM(s) Oral daily  atorvastatin 80 milliGRAM(s) Oral at bedtime  carvedilol 12.5 milliGRAM(s) Oral every 12 hours  clopidogrel Tablet 75 milliGRAM(s) Oral daily  gabapentin 100 milliGRAM(s) Oral at bedtime  isosorbide   mononitrate ER Tablet (IMDUR) 30 milliGRAM(s) Oral daily  lisinopril 20 milliGRAM(s) Oral daily  sodium chloride 0.9% lock flush 3 milliLiter(s) IV Push every 8 hours    MEDICATIONS  (PRN):      Allergies    No Known Allergies    Intolerances            Vital Signs Last 24 Hrs  T(C): 36.4 (19 Nov 2019 20:00), Max: 36.8 (19 Nov 2019 16:00)  T(F): 97.6 (19 Nov 2019 20:00), Max: 98.3 (19 Nov 2019 16:00)  HR: 68 (19 Nov 2019 23:35) (62 - 78)  BP: 122/86 (19 Nov 2019 23:35) (122/86 - 150/96)  BP(mean): 110 (19 Nov 2019 14:34) (110 - 110)  RR: 15 (19 Nov 2019 23:35) (14 - 73)  SpO2: 97% (19 Nov 2019 23:35) (96% - 100%)    I&O's Summary    19 Nov 2019 07:01  -  20 Nov 2019 01:49  --------------------------------------------------------  IN: 360 mL / OUT: 400 mL / NET: -40 mL      Weight (kg): 88.3 (11-19 @ 15:16)    PHYSICAL EXAM:  Constitutional: NAD, awake and alert, well-developed  HEENT: Moist Mucous Membranes, Anicteric  Pulmonary: Non-labored, breath sounds are clear bilaterally, No wheezing, rales or rhonchi  Cardiovascular: Regular, S1 and S2, No murmurs, rubs, gallops or clicks  Gastrointestinal: Bowel Sounds present, firm, nontender.   Lymph: No peripheral edema. No lymphadenopathy.  Skin: No visible rashes or ulcers. RRA and RFA cath sites stable  Psych:  Mood & affect appropriate    LABS: All Labs Reviewed:                        15.5   5.38  )-----------( 181      ( 19 Nov 2019 15:31 )             46.5     19 Nov 2019 15:31    142    |  102    |  10     ----------------------------<  79     3.9     |  27     |  0.91     Ca    9.4        19 Nov 2019 15:31    TPro  7.4    /  Alb  4.6    /  TBili  0.8    /  DBili  x      /  AST  21     /  ALT  18     /  AlkPhos  57     19 Nov 2019 15:31    PT/INR - ( 19 Nov 2019 15:31 )   PT: 15.6 sec;   INR: 1.36 ratio         PTT - ( 19 Nov 2019 15:31 )  PTT:35.7 sec

## 2019-11-20 NOTE — DISCHARGE NOTE NURSING/CASE MANAGEMENT/SOCIAL WORK - PATIENT PORTAL LINK FT
You can access the FollowMyHealth Patient Portal offered by [facility] by registering at the following website: [email or web address]. By joining [facility]’s FollowMyHealth portal, you will also be able to view your health information using other applications (apps) compatible with our system.

## 2019-11-20 NOTE — DISCHARGE NOTE PROVIDER - PROVIDER TOKENS
FREE:[LAST:[Your Doctor],PHONE:[(   )    -],FAX:[(   )    -],ADDRESS:[[facility]]] FREE:[LAST:[Your Doctor],PHONE:[(   )    -],FAX:[(   )    -],ADDRESS:[[facility]]],FREE:[LAST:[heart failure clinic],PHONE:[phone]:[(   )    -],ADDRESS:[[address]],SCHEDULEDAPPT:[11/25/2019],SCHEDULEDAPPTTIME:[01:30 PM]]

## 2019-11-20 NOTE — PROGRESS NOTE ADULT - ASSESSMENT
49 yr old male with PMH of CAD w/stents, MI, CHF EF 35-40%, AICD, CVA w/ right sided weakness transferred to [facility] from [facility] for PCI of the Lcx.

## 2019-11-20 NOTE — DISCHARGE NOTE NURSING/CASE MANAGEMENT/SOCIAL WORK - NSDCPEPTCOWAR_GEN_ALL_CORE
Warfarin/Coumadin - Dietary Advice/Warfarin/Coumadin - Compliance/Warfarin/Coumadin - Follow up monitoring/Warfarin/Coumadin - Potential for adverse drug reactions and interactions

## 2019-11-20 NOTE — DISCHARGE NOTE PROVIDER - NSDCACTIVITY_GEN_ALL_CORE
Walking - Outdoors allowed/Showering allowed/Stairs allowed/Walking - Indoors allowed/No heavy lifting/straining

## 2019-11-20 NOTE — DISCHARGE NOTE PROVIDER - NSDCMRMEDTOKEN_GEN_ALL_CORE_FT
aspirin 81 mg oral delayed release tablet: 1 tab(s) orally once a day  buy over the counter from pharmacy  carvedilol 12.5 mg oral tablet: 1 tab(s) orally 2 times a day  home  Crestor 40 mg oral tablet: 1 tab(s) orally once a day  gabapentin 100 mg oral tablet: orally once a day (at bedtime)  isosorbide mononitrate 30 mg oral tablet, extended release: 1 tab(s) orally once a day  home  lisinopril 20 mg oral tablet: 1 tab(s) orally once a day  reports taking half a pill 2/2 hypotension at home  Plavix 75 mg oral tablet: 1 tab(s) orally once a day  home aspirin 81 mg oral delayed release tablet: 1 tab(s) orally once a day  buy over the counter from pharmacy  carvedilol 12.5 mg oral tablet: 1 tab(s) orally 2 times a day  home  Crestor 40 mg oral tablet: 1 tab(s) orally once a day  gabapentin 100 mg oral tablet: orally once a day (at bedtime)  isosorbide mononitrate 30 mg oral tablet, extended release: 1 tab(s) orally once a day  home  lisinopril 20 mg oral tablet: 1 tab(s) orally once a day  reports taking half a pill 2/2 hypotension at home  Plavix 75 mg oral tablet: 1 tab(s) orally once a day  home  warfarin 4 mg oral tablet: 1 tab(s) orally once a day aspirin 81 mg oral delayed release tablet: 1 tab(s) orally once a day  buy over the counter from pharmacy  carvedilol 12.5 mg oral tablet: 1 tab(s) orally 2 times a day  home  Crestor 40 mg oral tablet: 1 tab(s) orally once a day  gabapentin 100 mg oral tablet: orally once a day (at bedtime)  isosorbide mononitrate 30 mg oral tablet, extended release: 1 tab(s) orally once a day  home  lisinopril 20 mg oral tablet: 1 tab(s) orally once a day  reports taking half a pill 2/2 hypotension at home  warfarin 4 mg oral tablet: 1 tab(s) orally once a day aspirin 81 mg oral delayed release tablet: 1 tab(s) orally once a day  buy over the counter from pharmacy  carvedilol 12.5 mg oral tablet: 1 tab(s) orally 2 times a day  home  clopidogrel 75 mg oral tablet: 1 tab(s) orally once a day  Crestor 40 mg oral tablet: 1 tab(s) orally once a day  gabapentin 100 mg oral tablet: orally once a day (at bedtime)  isosorbide mononitrate 30 mg oral tablet, extended release: 1 tab(s) orally once a day  home  lisinopril 20 mg oral tablet: 1 tab(s) orally once a day  reports taking half a pill 2/2 hypotension at home  warfarin 4 mg oral tablet: 1 tab(s) orally once a day

## 2019-11-20 NOTE — DISCHARGE NOTE PROVIDER - CARE PROVIDER_API CALL
Your Doctor,   [facility]  Phone: (   )    -  Fax: (   )    -  Follow Up Time: Your Doctor,   [facility]  Phone: (   )    -  Fax: (   )    -  Follow Up Time:     heart failure clinic,   [address]  Phone: [phone]  Fax: (   )    -  Scheduled Appointment: 11/25/2019 01:30 PM

## 2019-11-20 NOTE — DISCHARGE NOTE PROVIDER - NSDCFUADDINST_GEN_ALL_CORE_FT
Continue your medications. Do not stop Aspirin or Plavix unless instructed by your cardiologist.  No heavy lifting, strenuous activity, bending, straining or unnecessary stair climbing for 2 weeks. No driving for 2 days. You may shower 24 hours following procedure but avoid baths and swimming for 1 week. Check groin site for bleeding and/or swelling daily following procedure. Call your doctor/cardiologist immediately for bleeding or swelling or if you have increased/persistent pain or drainage at the site. Follow up with your cardiologist in 1- 2 weeks. You may call [facility] at [phone] (or [phone] after office hours and weekends) with any questions or concerns following your procedure. Continue your medications. Do not stop Aspirin or Plavix unless instructed by your cardiologist.  No heavy lifting, strenuous activity, bending, straining or unnecessary stair climbing for 2 weeks. No driving for 2 days. You may shower 24 hours following procedure but avoid baths and swimming for 1 week. Check groin site for bleeding and/or swelling daily following procedure. Call your doctor/cardiologist immediately for bleeding or swelling or if you have increased/persistent pain or drainage at the site. Follow up with your cardiologist in 1- 2 weeks. You may call [facility] at [phone] (or [phone] after office hours and weekends) with any questions or concerns following your procedure.    ***** ASA /Plavix/Coumadin****** for 1 month  then D/C ASA after 1 month and Continue Coumadin and Plavix . Stop Plavix after 6 months as per Dr. Kulkarni .

## 2019-11-20 NOTE — PROGRESS NOTE ADULT - PROBLEM SELECTOR PLAN 1
-s/p PEPE x1 to LCx via RFA  -continue aspirin, plavix, statin  -TTE ordered for eval of thrombus; pt has hx of apical thrombus that has supposedly resolved. Pending results, coumadin may be DC'd per Dr. Kulkarni  -plan for DC home pending echo results, lab review and site checks

## 2019-11-20 NOTE — DISCHARGE NOTE NURSING/CASE MANAGEMENT/SOCIAL WORK - NSDCPEPTSTRK_GEN_ALL_CORE
Prescribed medications/Stroke education booklet/Call 911 for stroke/Need for follow up after discharge/Stroke support groups for patients, families, and friends/Risk factors for stroke/Stroke warning signs and symptoms/Signs and symptoms of stroke

## 2019-11-20 NOTE — DISCHARGE NOTE PROVIDER - NSDCCPCAREPLAN_GEN_ALL_CORE_FT
PRINCIPAL DISCHARGE DIAGNOSIS  Diagnosis: Coronary artery disease  Assessment and Plan of Treatment: -one stent to left circumflex artery; continue your medications as ordered. You must take your aspirin and Plavix everyday as these medications keep your stents open. PRINCIPAL DISCHARGE DIAGNOSIS  Diagnosis: Coronary artery disease  Assessment and Plan of Treatment: -one stent to left circumflex artery; continue your medications as ordered. You must take your aspirin and Plavix everyday as these medications keep your stents open.      SECONDARY DISCHARGE DIAGNOSES  Diagnosis: CVA (cerebral vascular accident)  Assessment and Plan of Treatment: on coumadin

## 2019-11-20 NOTE — DISCHARGE NOTE NURSING/CASE MANAGEMENT/SOCIAL WORK - NSDCFUADDAPPT_GEN_ALL_CORE_FT
Please see your doctor at [facility] in 1-2 weeks and bring your discharge paperwork with you.  Follow up with [facility] failure [facility]    Monday 11/25/19 at 1330

## 2019-11-20 NOTE — DISCHARGE NOTE PROVIDER - HOSPITAL COURSE
48 yo AA male with PMH CAD with multiple stents to RCA and LAD, MI in [place] at [place] with recanalization of RCA and LAD restenosis with a mid RCA PEPE placement, apical thrombus in 2017 (on coumadin),Chronic systolic congestive heart failure (5/2018 EF 35-40%), hx of syncope and 2:1 HB with bradycardia with left sided dual chamber AICD (DDD ) in May [facility] CVA with right sided weakness (approx June 2019, date unsure, pt. presented with symptoms to cardiologist office upon return from [facility]) transferred today from [facility] for staged PCI to Cx. Patient went to [facility] for elective cardiac angiogram today with c/o midsternal chest tightness with and without exertion x 2-3 months. Patient evaluated by Dr. Crook recommended to have angiogram fir further ischemic evaluation Cath was done via RRA. Here today for PCI to Cx.         11/19: PEPE x1 to LCx via RFA; pt to continue on aspirin and plavix (home meds)    11/20: AM labs reviewed and pt may be DC'd home pending site check. Per Dr. Kulkarni, patient may stop coumadin as apical thrombus is no longer present per echo 48 yo AA male with PMH CAD with multiple stents to RCA and LAD, MI in [place] at [place] with recanalization of RCA and LAD restenosis with a mid RCA PEPE placement, apical thrombus in 2017 (on coumadin),Chronic systolic congestive heart failure (5/2018 EF 35-40%), hx of syncope and 2:1 HB with bradycardia with left sided dual chamber AICD (DDD ) in May 2018 [facility] CVA with right sided weakness (approx June 2019, date unsure, pt. presented with symptoms to cardiologist office upon return from [facility]) transferred today from [facility] for staged PCI to Cx. Patient went to [facility] for elective cardiac angiogram today with c/o midsternal chest tightness with and without exertion x 2-3 months. Patient evaluated by Dr. Crook recommended to have angiogram fir further ischemic evaluation Cath was done via RRA. Here today for PCI to Cx.         11/19: PEPE x1 to LCx via RFA; pt to continue on aspirin and plavix (home meds)    11/20: AM labs reviewed and pt may be DC'd home pending site check. Per Dr. Kulkarni, patient may stop coumadin as apical thrombus is no longer present per EP report from May 2018; also mentioned in patient discharge notes from [facility] in May 2018 that patient no longer needed AC for thrombus.        Patient: RICK LATHAM MRN:  [id number] Patient #:  [id number] Proc Date:     05/16/2018   Ord #: [id number]        Page 3 of 4    Patient History    This is a 47 year old male with past medical history of palpitations,     nighttime bradycardia , dizziness, CAD s/p PCI 2006, Ml 9/17, CHF, NSVT,      NYHA Class II with an EF of 35% on optimal medical therapy > 90 days and     apical thrombus-now resolved on TTE. Patient is now referred for ICD for     primary prevention.    Physical: Unremarkable.    Lab Data: Unremarkable. 48 yo AA male with PMH CAD with multiple stents to RCA and LAD, MI in 9/2017 at [facility] with recanalization of RCA and LAD restenosis with a mid RCA PEPE placement, apical thrombus in 2017 (on coumadin),Chronic systolic congestive heart failure (5/2018 EF 35-40%), hx of syncope and 2:1 HB with bradycardia with left sided dual chamber AICD (DDD ) in May 2018 [facility] (Dr. Crook, [place]), CVA with right sided weakness (approx June 2019, date unsure, pt. presented with symptoms to cardiologist office upon return from [facility]) transferred today from [facility] for staged PCI to Cx. Patient went to [facility] for elective cardiac angiogram today with c/o midsternal chest tightness with and without exertion x 2-3 months. Patient evaluated by Dr. Crook recommended to have angiogram fir further ischemic evaluation Cath was done via RRA. Here today for PCI to Cx.         11/19: PEPE x1 to LCx via RFA; pt to continue on aspirin and plavix (home meds)        < from: TTE with Doppler (w/Cont) (11.20.19 @ 09:46) >        Conclusions:    1. Thickened mitral valve. Minimal mitral regurgitation.    2. Calcified trileaflet aortic valve with normal opening.    Mild aortic regurgitation.    3. Moderate concentric left ventricular hypertrophy with    4. Endocardial visualization enhanced with intravenous    injection of Ultrasonic Enhancing Agent (Definity).    Moderate segmental left ventricular systolic dysfunction.    There is hyperdynamicmid-ventricular systolic function    with apical akinesis.    There is swirling of the Ultrasonic Enhancing Agent    (Definity) in the left ventricular apex indicating a region    of stasis, however a left ventricular thrombus is not seen.    May consider cardiac MRI to further rule out the presence    of a left ventricular thrombus.    5. Moderate diastolic dysfunction (Stage II).    6. Normal right ventricular size and function. A device    wire is noted in the right heart.    *** No previous Echo exam.    ------------------------------------------------------------------------    Confirmed on  11/20/2019 - 12:02:49 by Etienne Shane M.D.    ------------------------------------------------------------------------        < end of copied text >        < from: TTE Echo Complete w/Doppler (05.12.18 @ 10:54) >        Summary:     1. Left ventricular ejection fraction, by visual estimation, is 35 to     40%.     2. Mildly to moderately decreased global left ventricular systolic     function.     3. Multiple left ventricular regional wall motion abnormalities exist.     See wall motion findings.     4. Increased LV wall thickness.     5. There is severe septal left ventricular hypertrophy.     6. Thickening of the anterior and posterior mitral valve leaflets.        [id number] Negro Hanson MD, Electronically signed on 5/12/2018 at 12:38:30 PM             < end of copied text >                05/16/2018   Ord #: [id number]        Page 3 of 4    Patient History    This is a 47 year old male with past medical history of palpitations,     nighttime bradycardia , dizziness, CAD s/p PCI 2006, Ml 9/17, CHF, NSVT,      NYHA Class II with an EF of 35% on optimal medical therapy > 90 days and     apical thrombus-now resolved on TTE. Patient is now referred for ICD for     primary prevention.    Physical: Unremarkable.    Lab Data: Unremarkable.

## 2019-11-20 NOTE — DISCHARGE NOTE PROVIDER - NSDCFUADDAPPT_GEN_ALL_CORE_FT
Please see your doctor at [facility] in 1-2 weeks and bring your discharge paperwork with you. Please see your doctor at [facility] in 1-2 weeks and bring your discharge paperwork with you.  Follow up with [facility]    Monday 11/25/19 at 1330

## 2020-08-17 NOTE — H&P CARDIOLOGY - RESPIRATORY AND THORAX
Discussed with patient need to increase vegetables, and protein, give the child more whole foods and less processed foods, no sugar free foods, and to change to whole milk.  She will continue to keep a food diary and follow-up with Endocrine at the end of September as scheduled.   negative

## 2020-09-02 ENCOUNTER — INPATIENT (INPATIENT)
Facility: HOSPITAL | Age: 50
LOS: 1 days | Discharge: HOME | DRG: 287 | End: 2020-09-04
Attending: STUDENT IN AN ORGANIZED HEALTH CARE EDUCATION/TRAINING PROGRAM | Admitting: HOSPITALIST
Payer: MEDICAID

## 2020-09-02 VITALS
TEMPERATURE: 99 F | OXYGEN SATURATION: 98 % | HEART RATE: 70 BPM | DIASTOLIC BLOOD PRESSURE: 73 MMHG | WEIGHT: 184.97 LBS | SYSTOLIC BLOOD PRESSURE: 119 MMHG | HEIGHT: 68.5 IN | RESPIRATION RATE: 20 BRPM

## 2020-09-02 DIAGNOSIS — I25.110 ATHEROSCLEROTIC HEART DISEASE OF NATIVE CORONARY ARTERY WITH UNSTABLE ANGINA PECTORIS: ICD-10-CM

## 2020-09-02 DIAGNOSIS — Z95.810 PRESENCE OF AUTOMATIC (IMPLANTABLE) CARDIAC DEFIBRILLATOR: Chronic | ICD-10-CM

## 2020-09-02 PROBLEM — Z87.891 PERSONAL HISTORY OF NICOTINE DEPENDENCE: Chronic | Status: ACTIVE | Noted: 2019-11-19

## 2020-09-02 PROBLEM — Z00.00 ENCOUNTER FOR PREVENTIVE HEALTH EXAMINATION: Status: ACTIVE | Noted: 2020-09-02

## 2020-09-02 PROBLEM — I45.9 CONDUCTION DISORDER, UNSPECIFIED: Chronic | Status: ACTIVE | Noted: 2019-11-19

## 2020-09-02 LAB
ALBUMIN SERPL ELPH-MCNC: 4.6 G/DL — SIGNIFICANT CHANGE UP (ref 3.3–5.2)
ALP SERPL-CCNC: 82 U/L — SIGNIFICANT CHANGE UP (ref 40–120)
ALT FLD-CCNC: 23 U/L — SIGNIFICANT CHANGE UP
ANION GAP SERPL CALC-SCNC: 13 MMOL/L — SIGNIFICANT CHANGE UP (ref 5–17)
APPEARANCE UR: CLEAR — SIGNIFICANT CHANGE UP
APTT BLD: 61.2 SEC — HIGH (ref 27.5–35.5)
AST SERPL-CCNC: 23 U/L — SIGNIFICANT CHANGE UP
BASOPHILS # BLD AUTO: 0.02 K/UL — SIGNIFICANT CHANGE UP (ref 0–0.2)
BASOPHILS NFR BLD AUTO: 0.3 % — SIGNIFICANT CHANGE UP (ref 0–2)
BILIRUB SERPL-MCNC: 0.4 MG/DL — SIGNIFICANT CHANGE UP (ref 0.4–2)
BILIRUB UR-MCNC: NEGATIVE — SIGNIFICANT CHANGE UP
BUN SERPL-MCNC: 9 MG/DL — SIGNIFICANT CHANGE UP (ref 8–20)
CALCIUM SERPL-MCNC: 9.2 MG/DL — SIGNIFICANT CHANGE UP (ref 8.6–10.2)
CHLORIDE SERPL-SCNC: 103 MMOL/L — SIGNIFICANT CHANGE UP (ref 98–107)
CK MB CFR SERPL CALC: 2 NG/ML — SIGNIFICANT CHANGE UP (ref 0–6.7)
CK SERPL-CCNC: 243 U/L — HIGH (ref 30–200)
CO2 SERPL-SCNC: 26 MMOL/L — SIGNIFICANT CHANGE UP (ref 22–29)
COLOR SPEC: YELLOW — SIGNIFICANT CHANGE UP
CREAT SERPL-MCNC: 0.85 MG/DL — SIGNIFICANT CHANGE UP (ref 0.5–1.3)
DIFF PNL FLD: ABNORMAL
EOSINOPHIL # BLD AUTO: 0.09 K/UL — SIGNIFICANT CHANGE UP (ref 0–0.5)
EOSINOPHIL NFR BLD AUTO: 1.4 % — SIGNIFICANT CHANGE UP (ref 0–6)
EPI CELLS # UR: SIGNIFICANT CHANGE UP
GLUCOSE SERPL-MCNC: 84 MG/DL — SIGNIFICANT CHANGE UP (ref 70–99)
GLUCOSE UR QL: NEGATIVE MG/DL — SIGNIFICANT CHANGE UP
HCT VFR BLD CALC: 49 % — SIGNIFICANT CHANGE UP (ref 39–50)
HGB BLD-MCNC: 16 G/DL — SIGNIFICANT CHANGE UP (ref 13–17)
IMM GRANULOCYTES NFR BLD AUTO: 0.2 % — SIGNIFICANT CHANGE UP (ref 0–1.5)
INR BLD: 4.74 RATIO — HIGH (ref 0.88–1.16)
KETONES UR-MCNC: NEGATIVE — SIGNIFICANT CHANGE UP
LEUKOCYTE ESTERASE UR-ACNC: NEGATIVE — SIGNIFICANT CHANGE UP
LYMPHOCYTES # BLD AUTO: 3.21 K/UL — SIGNIFICANT CHANGE UP (ref 1–3.3)
LYMPHOCYTES # BLD AUTO: 49.6 % — HIGH (ref 13–44)
MAGNESIUM SERPL-MCNC: 2.1 MG/DL — SIGNIFICANT CHANGE UP (ref 1.6–2.6)
MCHC RBC-ENTMCNC: 28.9 PG — SIGNIFICANT CHANGE UP (ref 27–34)
MCHC RBC-ENTMCNC: 32.7 GM/DL — SIGNIFICANT CHANGE UP (ref 32–36)
MCV RBC AUTO: 88.6 FL — SIGNIFICANT CHANGE UP (ref 80–100)
MONOCYTES # BLD AUTO: 0.72 K/UL — SIGNIFICANT CHANGE UP (ref 0–0.9)
MONOCYTES NFR BLD AUTO: 11.1 % — SIGNIFICANT CHANGE UP (ref 2–14)
NEUTROPHILS # BLD AUTO: 2.42 K/UL — SIGNIFICANT CHANGE UP (ref 1.8–7.4)
NEUTROPHILS NFR BLD AUTO: 37.4 % — LOW (ref 43–77)
NITRITE UR-MCNC: NEGATIVE — SIGNIFICANT CHANGE UP
PH UR: 7 — SIGNIFICANT CHANGE UP (ref 5–8)
PLATELET # BLD AUTO: 189 K/UL — SIGNIFICANT CHANGE UP (ref 150–400)
POTASSIUM SERPL-MCNC: 3.7 MMOL/L — SIGNIFICANT CHANGE UP (ref 3.5–5.3)
POTASSIUM SERPL-SCNC: 3.7 MMOL/L — SIGNIFICANT CHANGE UP (ref 3.5–5.3)
PROT SERPL-MCNC: 7.4 G/DL — SIGNIFICANT CHANGE UP (ref 6.6–8.7)
PROT UR-MCNC: NEGATIVE MG/DL — SIGNIFICANT CHANGE UP
PROTHROM AB SERPL-ACNC: 51.1 SEC — HIGH (ref 10.6–13.6)
RBC # BLD: 5.53 M/UL — SIGNIFICANT CHANGE UP (ref 4.2–5.8)
RBC # FLD: 12 % — SIGNIFICANT CHANGE UP (ref 10.3–14.5)
RBC CASTS # UR COMP ASSIST: SIGNIFICANT CHANGE UP /HPF (ref 0–4)
SARS-COV-2 RNA SPEC QL NAA+PROBE: SIGNIFICANT CHANGE UP
SODIUM SERPL-SCNC: 142 MMOL/L — SIGNIFICANT CHANGE UP (ref 135–145)
SP GR SPEC: 1.01 — SIGNIFICANT CHANGE UP (ref 1.01–1.02)
TROPONIN T SERPL-MCNC: <0.01 NG/ML — SIGNIFICANT CHANGE UP (ref 0–0.06)
UROBILINOGEN FLD QL: NEGATIVE MG/DL — SIGNIFICANT CHANGE UP
WBC # BLD: 6.47 K/UL — SIGNIFICANT CHANGE UP (ref 3.8–10.5)
WBC # FLD AUTO: 6.47 K/UL — SIGNIFICANT CHANGE UP (ref 3.8–10.5)
WBC UR QL: NEGATIVE — SIGNIFICANT CHANGE UP

## 2020-09-02 PROCEDURE — 93306 TTE W/DOPPLER COMPLETE: CPT | Mod: 26

## 2020-09-02 PROCEDURE — 71045 X-RAY EXAM CHEST 1 VIEW: CPT | Mod: 26

## 2020-09-02 PROCEDURE — 93010 ELECTROCARDIOGRAM REPORT: CPT

## 2020-09-02 PROCEDURE — 99284 EMERGENCY DEPT VISIT MOD MDM: CPT

## 2020-09-02 RX ORDER — CARVEDILOL PHOSPHATE 80 MG/1
12.5 CAPSULE, EXTENDED RELEASE ORAL EVERY 12 HOURS
Refills: 0 | Status: DISCONTINUED | OUTPATIENT
Start: 2020-09-02 | End: 2020-09-04

## 2020-09-02 RX ORDER — ASPIRIN/CALCIUM CARB/MAGNESIUM 324 MG
81 TABLET ORAL DAILY
Refills: 0 | Status: DISCONTINUED | OUTPATIENT
Start: 2020-09-02 | End: 2020-09-04

## 2020-09-02 RX ORDER — NITROGLYCERIN 6.5 MG
1 CAPSULE, EXTENDED RELEASE ORAL ONCE
Refills: 0 | Status: COMPLETED | OUTPATIENT
Start: 2020-09-02 | End: 2020-09-02

## 2020-09-02 RX ORDER — ATORVASTATIN CALCIUM 80 MG/1
80 TABLET, FILM COATED ORAL AT BEDTIME
Refills: 0 | Status: DISCONTINUED | OUTPATIENT
Start: 2020-09-02 | End: 2020-09-04

## 2020-09-02 RX ORDER — NITROGLYCERIN 6.5 MG
0.4 CAPSULE, EXTENDED RELEASE ORAL
Refills: 0 | Status: DISCONTINUED | OUTPATIENT
Start: 2020-09-02 | End: 2020-09-04

## 2020-09-02 RX ORDER — LISINOPRIL 2.5 MG/1
20 TABLET ORAL DAILY
Refills: 0 | Status: DISCONTINUED | OUTPATIENT
Start: 2020-09-02 | End: 2020-09-04

## 2020-09-02 RX ORDER — ISOSORBIDE MONONITRATE 60 MG/1
30 TABLET, EXTENDED RELEASE ORAL DAILY
Refills: 0 | Status: DISCONTINUED | OUTPATIENT
Start: 2020-09-02 | End: 2020-09-04

## 2020-09-02 RX ORDER — CLOPIDOGREL BISULFATE 75 MG/1
75 TABLET, FILM COATED ORAL DAILY
Refills: 0 | Status: DISCONTINUED | OUTPATIENT
Start: 2020-09-02 | End: 2020-09-04

## 2020-09-02 RX ADMIN — CLOPIDOGREL BISULFATE 75 MILLIGRAM(S): 75 TABLET, FILM COATED ORAL at 22:46

## 2020-09-02 RX ADMIN — CARVEDILOL PHOSPHATE 12.5 MILLIGRAM(S): 80 CAPSULE, EXTENDED RELEASE ORAL at 22:46

## 2020-09-02 RX ADMIN — Medication 1 INCH(S): at 18:07

## 2020-09-02 RX ADMIN — Medication 81 MILLIGRAM(S): at 22:46

## 2020-09-02 RX ADMIN — ATORVASTATIN CALCIUM 80 MILLIGRAM(S): 80 TABLET, FILM COATED ORAL at 22:46

## 2020-09-02 NOTE — H&P ADULT - NSHPPHYSICALEXAM_GEN_ALL_CORE
General: Well developed male sitting up in bed not in distress  HEENT: AT, NC. PERRL. intact EOM. no throat erythema or exudate.   Neck: supple. no JVD.   Chest: CTA bilaterally. air entry is fair b/L.   Heart: S1,S2. RRR. no heart murmur. No edema.   Abdomen: soft. non-tender. non-distended. + BS.   Ext: no calf tenderness. distal pulses 2 + B/L.   Neuro: AAO x3 . no speech disorder, rt. Lower ext. residual motor deficit about 3 to 4 /5, other muscle groups motor 5/5. sensory intact. all Cns intact.  Skin: no rash noted, normal tone. no diaphoresis.   Psych : normal affect, co-operative. no si/hi.

## 2020-09-02 NOTE — CONSULT NOTE ADULT - ASSESSMENT
Pt is a 49 y/o male with medical history of CAD with multiple stents to RCA, circ. and LAD (most recent 11/2019), MI in 9/2017 at [place] with recanalization of RCA and LAD restenosis with a mid RCA PEPE placement, apical thrombus in 2017 (on coumadin),Chronic systolic congestive heart failure (5/2018 EF 35-40%), hx of syncope and 2:1 HB with bradycardia with left sided dual chamber MDT AICD (DDD ) in May 2018 [facility] (Dr. Crook [place]), CVA with right sided weakness (approx June 2019, date unsure) who presents to [facility] for chest pain. Pt states that for past 3 days he has been experiencing chest pain. Chest Pain described as pressure 6/10, non raidiating, no pain modifying factors. Pt also admits to associated symptoms of intermittently palpitations. Pt took his BP last night and reading was 171/113. Pain worsened today so pt GF brought him to ED. In ED, ECG-NSR HR @ 68 with T-wave abnormalities in inferior lateral leads, INR-4.74 , Trop#1-negative. Upon assessment pt still c/o of chest pain. Pt bedside RN stated that pt had two runs of NSVT one 12 beats one 14 beats. Pt given nitro paste as ordered. Physical exam otherwise benign. Pt admits to stopping Plavix since June b/c he was told to.     Chest Pain  -ECG- NSR HR @ 68 with T-wave abnormalities in inferior lateral leads  -Trop#1-negative  -INR-4.74  -Pt given Nitro Paste as ordered  -If INR <3 in AM, plan for cath 9/3  -Cont. home medications  -Cont. Trend Trop

## 2020-09-02 NOTE — H&P ADULT - NSICDXPASTSURGICALHX_GEN_ALL_CORE_FT
PAST SURGICAL HISTORY:  Cardiac defibrillator in place 2017  [employer] Visa AF MRI VR Surescan ICD   Model# [id number], SN# [id number]

## 2020-09-02 NOTE — ED ADULT NURSE NOTE - OBJECTIVE STATEMENT
Pt is here with c/o chest pain, palpitations and dizziness  for the past 3 days and states pain increases with deep inspiration but denies SOB.  Pt has a defibrillator and hx of MI with 5 stents.  Pt chest pain x 3days with defibrillator. worsening with deep breathe. hx of MI and 5 stents. pt ambulatory with cane.

## 2020-09-02 NOTE — ED PROVIDER NOTE - CLINICAL SUMMARY MEDICAL DECISION MAKING FREE TEXT BOX
Pt is a 51 yo M with extensive cardiac history including MI, multiple stents, s/p defibrillator presenting with 3 days of symptoms concerning for cardiac ischemia. Suspect coronary stent restenosis given stopped plavix in July vs new cardiac occlusion. Will get serial EKGs, trops, BNP, CBC, CMP, chest x-ray.

## 2020-09-02 NOTE — H&P ADULT - ASSESSMENT
pt. is admitted for     - Chest pain with high risk for cardiac etiology, serial trop, b.blk, asa, palvix started by cardio team, SL NTG prn, echo, tele monitoring, possible cath per am inr as per cardiology team.     - Essential htn,  coreg and lisinopril , imdur on board.     - Supratherapeutic inr, hold coumadin for now and follow morning INR. if no LV thrombus on echo cardio plans to stop coumadin.     - hyperlipidemia unspecified type, high dose statin.     - H/O cva, at baseline.

## 2020-09-02 NOTE — CONSULT NOTE ADULT - SUBJECTIVE AND OBJECTIVE BOX
[place] [facility]                                                               Office:  [address] [place] [address]                                                              Telephone: [phone]. Fax:[phone]                                                                        CARDIOLOGY CONSULTATION NOTE                                                                                             Consult requested by:  Dr. Santos  Reason for Consultation: Chest Pain  History obtained by: Patient and medical record   obtained: No    Chief complaint:    Patient is a 50y old  Male who presents with a chief complaint of       HPI:        REVIEW OF SYMPTOMS:     CONSTITUTIONAL: No fever, weight loss, or fatigue  ENMT:  No difficulty hearing, tinnitus, vertigo; No sinus or throat pain  NECK: No pain or stiffness  CARDIOVASCULAR: No chest pain, dyspnea, syncope, palpitations, dizziness, Orthopnea, Paroxsymal nocturnal dyspnea  RESPIRATORY: No Dyspnea on exertion, Shortness of breath, cough, wheezing  : No dysuria, no hematuria   GI: No dark color stool, no melena, no diarrhea, no constipation, no abdominal pain   NEURO: No headache, no dizziness, no slurred speech   MUSCULOSKELETAL: No joint pain or swelling; No muscle, back, or extremity pain  PSYCH: No agitation, no anxiety.    ALL OTHER REVIEW OF SYSTEMS ARE NEGATIVE.      PREVIOUS DIAGNOSTIC TESTING  ECHO FINDINGS: < from: TTE with Doppler (w/Cont) (11.20.19 @ 09:46) >  Conclusions:  1. Thickened mitral valve. Minimal mitral regurgitation.  2. Calcified trileaflet aortic valve with normal opening.  Mild aortic regurgitation.  3. Moderate concentric left ventricular hypertrophy with  4. Endocardial visualization enhanced with intravenous  injection of Ultrasonic Enhancing Agent (Definity).  Moderate segmental left ventricular systolic dysfunction.  There is hyperdynamicmid-ventricular systolic function  with apical akinesis.  There is swirling of the Ultrasonic Enhancing Agent  (Definity) in the left ventricular apex indicating a region  of stasis, however a left ventricular thrombus is not seen.  May consider cardiac MRI to further rule out the presence  of a left ventricular thrombus.  5. Moderate diastolic dysfunction (Stage II).  6. Normal right ventricular size and function. A device  wire is noted in the right heart.  *** No previous Echo exam.      STRESS FINDINGS:  < from: Nuclear Stress Test-Exercise (05.15.18 @ 12:35) >  IMPRESSIONS:Abnormal Study  * Exercise capacity: 11 METS, Average for age and gender.  93% of MPHR.  * Chest Pain: No chest pain with exercise.  * HR Response: Appropriate.  * BP Response: Appropriate.  * Heart Rhythm: Sinus Rhythm - 85 BPM.  * ECG Abnormalities: There were no diagnostic changes.  * Arrhythmia: occasional PVCs in the early exercise period  and recvoery period.  * There is a medium sized, mild to moderate defect in  inferior wall that is predominantly fixed, suggestive of  infarction with minimal austin-infarct ischemia.There is a  medium sized, severe defect in apical walls that is  predominantly fixed, suggestive of infarction with minimal  austin-infarct ischemia.  *  Apical walls akinetic. Inferior wall akinetic. Post  stress LVEF 36%.          CATHETERIZATION FINDINGS: < from: Cardiac Cath Lab - Adult (11.19.19 @ 15:42) >  CORONARY VESSELS:  CX:   --  Mid circumflex: There was a 70 % stenosis.  COMPLICATIONS: There were no complications.              ALLERGIES: Allergies    No Known Allergies    Intolerances          PAST MEDICAL HISTORY  Former smoker  Heart block  CAD (coronary artery disease)  Stented coronary artery  Thrombosis  MI (myocardial infarction)  HTN (hypertension)      PAST SURGICAL HISTORY  Cardiac defibrillator in place  No significant past surgical history      FAMILY HISTORY:  Family history of MI (myocardial infarction) (Father)      SOCIAL HISTORY:  Denies smoking/alcohol/drugs        CURRENT MEDICATIONS:  nitroglycerin    2% Ointment 1 Inch(s) Transdermal Once           HOME MEDICATIONS:    aspirin 81 mg oral delayed release tablet: 1 tab(s) orally once a day  buy over the counter from pharmacy (20 Nov 2019 04:50)  carvedilol 12.5 mg oral tablet: 1 tab(s) orally 2 times a day  home (19 Nov 2019 15:15)  clopidogrel 75 mg oral tablet: 1 tab(s) orally once a day (20 Nov 2019 16:25)  Crestor 40 mg oral tablet: 1 tab(s) orally once a day (19 Nov 2019 15:15)  gabapentin 100 mg oral tablet: orally once a day (at bedtime) (19 Nov 2019 15:15)  lisinopril 20 mg oral tablet: 1 tab(s) orally once a day  reports taking half a pill 2/2 hypotension at home (19 Nov 2019 15:15)  warfarin 4 mg oral tablet: 1 tab(s) orally once a day (20 Nov 2019 13:48)      Vital Signs Last 24 Hrs  T(C): 37.3 (02 Sep 2020 16:05), Max: 37.3 (02 Sep 2020 16:05)  T(F): 99.1 (02 Sep 2020 16:05), Max: 99.1 (02 Sep 2020 16:05)  HR: 66 (02 Sep 2020 16:05) (66 - 70)  BP: 135/82 (02 Sep 2020 16:05) (119/73 - 135/82)  RR: 18 (02 Sep 2020 16:05) (18 - 20)  SpO2: 99% (02 Sep 2020 16:05) (98% - 99%)      PHYSICAL EXAM:  Constitutional: Comfortable . No acute distress.   HEENT: Atraumatic and normocephalic , neck is supple . no JVD. No carotid bruit. PEERL   CNS: A&Ox3. No focal deficits. EOMI. Cranial nerves II-IX are intact.   Lymph Nodes: Cervical : Not palpable.  Respiratory: CTAB  Cardiovascular: S1S2 RRR. No murmur/rubs or gallop.  Gastrointestinal: Soft non-tender and non distended . +Bowel sounds. negative Hidalgo's sign.  Extremities: No edema.   Psychiatric: Calm . no agitation.  Skin: No skin rash/ulcers visualized to face, hands or feet.    Intake and output:     LABS:            ;p-BNP=        INTERPRETATION OF TELEMETRY:   ECG: NSR HR @ 68 with T-wave abnormalities in inferior lateral leads      RADIOLOGY & ADDITIONAL STUDIES:    X-ray:   CT scan:   MRI: [place] [facility]                                                               Office:  [address] [address]                                                              Telephone: [phone]. Fax:[phone]                                                                        CARDIOLOGY CONSULTATION NOTE                                                                                             Consult requested by:  Dr. Santos  Reason for Consultation: Chest Pain  History obtained by: Patient and medical record   obtained: No    Chief complaint:    Patient is a 50y old  Male who presents with a chief complaint of chest pain      HPI: Pt is a 49 y/o male with medical history of CAD with multiple stents to RCA, circ. and LAD (most recent 11/2019), MI in 9/2017 at [facility] with recanalization of RCA and LAD restenosis with a mid RCA PEPE placement, apical thrombus in 2017 (on coumadin),Chronic systolic congestive heart failure (5/2018 EF 35-40%), hx of syncope and 2:1 HB with bradycardia with left sided dual chamber MDT AICD (DDD ) [facility] CVA with right sided weakness (approx June 2019, date unsure) who presents to [facility]-ED for chest pain. Pt states that for past 3 days he has been experiencing chest pain. Chest Pain described as pressure 6/10, non raidiating, no pain modifying factors. Pt also admits to associated symptoms of intermittently palpitations. Pt took his BP last night and reading was 171/113. Pain worsened today so pt GF brought him to ED. In ED, ECG-NSR HR @ 68 with T-wave abnormalities in inferior lateral leads, INR-4.74 , Trop#1-negative. Upon assessment pt still c/o of chest pain. Pt bedside RN stated that pt had two runs of NSVT one 12 beats one 14 beats. Pt given nitro paste as ordered. Physical exam otherwise benign. Pt admits to stopping Plavix since June b/c he was told to.           REVIEW OF SYMPTOMS:     CONSTITUTIONAL: No fever, weight loss, or fatigue  ENMT:  No difficulty hearing, tinnitus, vertigo; No sinus or throat pain  NECK: No pain or stiffness  CARDIOVASCULAR: See HPI  RESPIRATORY: No Dyspnea on exertion, Shortness of breath, cough, wheezing  : No dysuria, no hematuria   GI: No dark color stool, no melena, no diarrhea, no constipation, no abdominal pain   NEURO: No headache, no dizziness, no slurred speech   MUSCULOSKELETAL: No joint pain or swelling; No muscle, back, or extremity pain  PSYCH: No agitation, no anxiety.    ALL OTHER REVIEW OF SYSTEMS ARE NEGATIVE.      PREVIOUS DIAGNOSTIC TESTING  ECHO FINDINGS: < from: TTE with Doppler (w/Cont) (11.20.19 @ 09:46) >  Conclusions:  1. Thickened mitral valve. Minimal mitral regurgitation.  2. Calcified trileaflet aortic valve with normal opening.  Mild aortic regurgitation.  3. Moderate concentric left ventricular hypertrophy with  4. Endocardial visualization enhanced with intravenous  injection of Ultrasonic Enhancing Agent (Definity).  Moderate segmental left ventricular systolic dysfunction.  There is hyperdynamicmid-ventricular systolic function  with apical akinesis.  There is swirling of the Ultrasonic Enhancing Agent  (Definity) in the left ventricular apex indicating a region  of stasis, however a left ventricular thrombus is not seen.  May consider cardiac MRI to further rule out the presence  of a left ventricular thrombus.  5. Moderate diastolic dysfunction (Stage II).  6. Normal right ventricular size and function. A device  wire is noted in the right heart.  *** No previous Echo exam.      STRESS FINDINGS:  < from: Nuclear Stress Test-Exercise (05.15.18 @ 12:35) >  IMPRESSIONS:Abnormal Study  * Exercise capacity: 11 METS, Average for age and gender.  93% of MPHR.  * Chest Pain: No chest pain with exercise.  * HR Response: Appropriate.  * BP Response: Appropriate.  * Heart Rhythm: Sinus Rhythm - 85 BPM.  * ECG Abnormalities: There were no diagnostic changes.  * Arrhythmia: occasional PVCs in the early exercise period  and recvoery period.  * There is a medium sized, mild to moderate defect in  inferior wall that is predominantly fixed, suggestive of  infarction with minimal austin-infarct ischemia.There is a  medium sized, severe defect in apical walls that is  predominantly fixed, suggestive of infarction with minimal  austin-infarct ischemia.  *  Apical walls akinetic. Inferior wall akinetic. Post  stress LVEF 36%.          CATHETERIZATION FINDINGS: < from: Cardiac Cath Lab - Adult (11.19.19 @ 15:42) >  CORONARY VESSELS:  CX:   --  Mid circumflex: There was a 70 % stenosis.  COMPLICATIONS: There were no complications.              ALLERGIES: Allergies    No Known Allergies    Intolerances          PAST MEDICAL HISTORY  Former smoker  Heart block  CAD (coronary artery disease)  Stented coronary artery  Thrombosis  MI (myocardial infarction)  HTN (hypertension)      PAST SURGICAL HISTORY  Cardiac defibrillator in place  No significant past surgical history      FAMILY HISTORY:  Family history of MI (myocardial infarction) (Father)      SOCIAL HISTORY:  Denies smoking/alcohol/drugs        CURRENT MEDICATIONS:  nitroglycerin    2% Ointment 1 Inch(s) Transdermal Once           HOME MEDICATIONS:    aspirin 81 mg oral delayed release tablet: 1 tab(s) orally once a day  buy over the counter from pharmacy (20 Nov 2019 04:50)  carvedilol 12.5 mg oral tablet: 1 tab(s) orally 2 times a day  home (19 Nov 2019 15:15)  clopidogrel 75 mg oral tablet: 1 tab(s) orally once a day (20 Nov 2019 16:25)  Crestor 40 mg oral tablet: 1 tab(s) orally once a day (19 Nov 2019 15:15)  gabapentin 100 mg oral tablet: orally once a day (at bedtime) (19 Nov 2019 15:15)  lisinopril 20 mg oral tablet: 1 tab(s) orally once a day  reports taking half a pill 2/2 hypotension at home (19 Nov 2019 15:15)  warfarin 4 mg oral tablet: 1 tab(s) orally once a day (20 Nov 2019 13:48)      Vital Signs Last 24 Hrs  T(C): 37.3 (02 Sep 2020 16:05), Max: 37.3 (02 Sep 2020 16:05)  T(F): 99.1 (02 Sep 2020 16:05), Max: 99.1 (02 Sep 2020 16:05)  HR: 66 (02 Sep 2020 16:05) (66 - 70)  BP: 135/82 (02 Sep 2020 16:05) (119/73 - 135/82)  RR: 18 (02 Sep 2020 16:05) (18 - 20)  SpO2: 99% (02 Sep 2020 16:05) (98% - 99%)      PHYSICAL EXAM:  Constitutional: Comfortable . No acute distress.   HEENT: Atraumatic and normocephalic , neck is supple . no JVD. No carotid bruit. PEERL   CNS: A&Ox3. No focal deficits. EOMI.    Lymph Nodes: Cervical : Not palpable.  Respiratory: CTAB  Cardiovascular: S1S2 RRR. No murmur/rubs or gallop.  Gastrointestinal: Soft non-tender and non distended . +Bowel sounds. negative Hidalgo's sign.  Extremities: No edema.   Psychiatric: Calm . no agitation.  Skin: No skin rash/ulcers visualized to face, hands or feet.    Intake and output:     LABS:            ;p-BNP=        INTERPRETATION OF TELEMETRY: NSR HR @ 60-70s with two episodes of NSVT 12 and 14 beats  ECG: NSR HR @ 68 with T-wave abnormalities in inferior lateral leads

## 2020-09-02 NOTE — H&P ADULT - NSHPSOCIALHISTORY_GEN_ALL_CORE
stopped smoking 1 years ago , prior to that smoked 1/2 ppd for 15 years. stopped etoh use also 1 year ago.

## 2020-09-02 NOTE — ED PROVIDER NOTE - ATTENDING CONTRIBUTION TO CARE
Rekha Winter
49 y/o male comes in c/o chest pain x three days h/o CAD and stents   chest pain exacerbated by ambulation    c/o left arm pain and palpitations   d/c plavix    labs cxr ekg reeval

## 2020-09-02 NOTE — H&P ADULT - NSICDXPASTMEDICALHX_GEN_ALL_CORE_FT
PAST MEDICAL HISTORY:  CAD (coronary artery disease)     Former smoker     Heart block 2:1 HB   dual chamber AICD (DDD ) in May 2018 [facility] (Dr. Crook [place])    HTN (hypertension)     MI (myocardial infarction) 2 coronary stents    Status post CVA residual rt. lower ext weakness.    Stented coronary artery     Thrombosis apical

## 2020-09-02 NOTE — CONSULT NOTE ADULT - ATTENDING COMMENTS
50M significant h/o CAD/ischemic cardiomyopathy/HFrEF with prior LV thrombus, last PCI/stent to mLCx in 11/2019, off clopidogrel since 7/2020 and no outpatient cardiology follow up since his cardiologist left practice at [facility], supposedly off warfarin as last TTE in 11/2019 without obvious LV apical thrombus but unclear remains on it with elevated INR, came in with typical angina x3 days, noted NSVT on telemetry but 1st Troponin and EKG unremarkable, AICD interrogated without recent event.   -suspects unstable angina, clopidogrel load 300mg x1 and NPO for left heart cath tomorrow if INR downtrended  -hold warfarin for now and await repeat TTE.   -continue trend Troponin and telemetry      Mulugeta Rush DO, [facility]  Faculty Non-Invasive Cardiologist  [phone] 50M significant h/o CAD/ischemic cardiomyopathy/HFrEF with prior LV thrombus, last PCI/stent to mLCx in 11/2019, off clopidogrel since 7/2020 and no outpatient cardiology follow up since his cardiologist left practice at [facility], supposedly off warfarin as last TTE in 11/2019 without obvious LV apical thrombus but unclear remains on it with elevated INR, came in with typical angina x3 days, noted NSVT on telemetry but 1st Troponin and EKG unremarkable, AICD interrogated without recent event.   -suspects unstable angina, restart clopidogrel and NPO for left heart cath tomorrow if INR downtrended  -hold warfarin for now and await repeat TTE.   -continue trend Troponin and telemetry      Mulugeta Rush DO, [facility]  Faculty Non-Invasive Cardiologist  [phone] 50M significant h/o CAD/ischemic cardiomyopathy/HFrEF with prior LV thrombus, last PCI/stent to mLCx in 11/2019, off clopidogrel since 7/2020 and no outpatient cardiology follow up since his cardiologist left practice at [facility], supposedly off warfarin as last TTE in 11/2019 without obvious LV apical thrombus but unclear remains on it with elevated INR, came in with typical angina x3 days, noted NSVT on telemetry but 1st Troponin and EKG unremarkable, AICD interrogated without recent event.   -suspects unstable angina, restart clopidogrel and NPO for left heart cath tomorrow if INR downtrended  -hold warfarin for now and await repeat TTE. if no recurrent LV thrombus then can stop warfarin, given prior h/o restenosis he should be maintained on life-long DAPT.   -continue trend Troponin and telemetry      Mulugeta Rush DO, [facility]  Faculty Non-Invasive Cardiologist  [phone]

## 2020-09-02 NOTE — ED PROVIDER NOTE - OBJECTIVE STATEMENT
Pt is a 49 yo M with extensive cardiac history including past MI 2017, 5 stents in the RCA, LAD, and LCF, defibrilator, and CVA presenting with 3 days of chest pain/pressure. Pain is exacerbated by ambulation and improves with rest. Endorses L arm pain and intermittent palpitations. Denies N/V, diaphoresis, cough, SOB, fever, abd pain.    Pt states he stopped taking plavix in July because he had been on it for 6 months and his cardiologist told him to stop after 6 months. He states he does not regularly follow with one cardiologist and would like to see house cardiology.

## 2020-09-02 NOTE — ED ADULT TRIAGE NOTE - CHIEF COMPLAINT QUOTE
chest pain x 3days with defibrillator. worsening with deep breathe. hx of MI and 5 stents. pt ambulatory with cane

## 2020-09-02 NOTE — ED PROVIDER NOTE - PMH
CAD (coronary artery disease)    Former smoker    Heart block  2:1 HB   dual chamber AICD (DDD ) in May 2018 [facility] (David Mahmood)  HTN (hypertension)    MI (myocardial infarction)  2 coronary stents  Stented coronary artery    Thrombosis  apical

## 2020-09-02 NOTE — ED PROVIDER NOTE - CARE PLAN
Principal Discharge DX:	Coronary artery disease with unstable angina pectoris, unspecified vessel or lesion type, unspecified whether native or transplanted heart

## 2020-09-02 NOTE — ED ADULT NURSE REASSESSMENT NOTE - NS ED NURSE REASSESS COMMENT FT1
Assumed pt care at this time. Pt resting comfortably in stretcher, A&Ox3, NAD noted, respirations even and nonlabored, NSR on monitor. Pt c/o slight CP. Girlfriend at bedside for creole interpretation.

## 2020-09-02 NOTE — ED PROVIDER NOTE - NS ED ATTENDING STATEMENT MOD
Attending Only I have personally seen and examined this patient. I have fully participated in the care of this patient. I have reviewed all pertinent clinical information, including history, physical exam, plan and the Medical/PA/NP Student's note and agree except as noted.

## 2020-09-02 NOTE — H&P ADULT - HISTORY OF PRESENT ILLNESS
Pt is a 49 y/o male with medical history of CAD with multiple stents to RCA, circ. and LAD (most recent 11/2019), MI in 9/2017 at [facility] with recanalization of RCA and LAD restenosis with a mid RCA PEPE placement, apical thrombus in 2017 (on coumadin),Chronic systolic congestive heart failure (5/2018 EF 35-40%), hx of syncope and 2:1 HB with bradycardia with left sided dual chamber MDT AICD (DDD ) in May 2018 [facility] (Dr. Crook [place]), CVA with right sided weakness (approx June 2019, date unsure) who presents to [facility]-ED for chest pain. Pt states that for past 3 days he has been experiencing chest pain. Chest Pain described as pressure 6/10, non raidiating, no pain modifying factors. Pt also admits to associated symptoms of intermittently palpitations. Pt took his BP last night and reading was 171/113. Pain worsened today so pt GF brought him to ED. In ED, ECG-NSR HR @ 68 with T-wave abnormalities in inferior lateral leads, INR-4.74 , Trop#1-negative. Upon assessment pt still c/o of chest pain. Pt bedside RN stated that pt had two runs of NSVT one 12 beats one 14 beats. Pt given nitro paste as ordered. Physical exam otherwise benign. Pt admits to stopping Plavix since June b/c he was told to. 51 y/o male with medical history of CAD with multiple stents to RCA, circ. and LAD (most recent 11/2019), MI in 9/2017 at [facility] with recanalization of RCA and LAD restenosis with a mid RCA PEPE placement, apical thrombus in 2017 (on coumadin),Chronic systolic congestive heart failure (5/2018 EF 35-40%), hx of syncope and 2:1 HB with bradycardia with left sided dual chamber MDT AICD (DDD ) in May 2018 [facility] (David Mahmood), CVA with right sided weakness (approx June 2019, date unsure) who presents to [facility]-ED for chest pain. Pt states that for past 3 days he has been experiencing chest pain, on and off and episodes lasting for about 10 minutes. Chest Pain described as pressure 6/10, non raidiating, no pain modifying factors. no sob. no diaphoresis, no abd. pain. no n/v/d. no syncopy or dizziness reported. Pt also admits to associated symptoms of intermittently palpitations. Pt took his BP last night and reading was 171/113. Pain worsened today so pt GF brought him to ED. In ED, ECG-NSR HR @ 68 with T-wave abnormalities in inferior lateral leads, INR-4.74 , Trop#1-negative. Pt bedside RN stated that pt had two runs of NSVT one 12 beats one 14 beats. Pt given nitro paste in the ER, no sig. chest pain at the time of admission. 49 y/o male with medical history of CAD with multiple stents to RCA, circ. and LAD (most recent 11/2019), MI in 9/2017 at [facility] with recanalization of RCA and LAD restenosis with a mid RCA PEPE placement, apical thrombus in 2017 (on coumadin),Chronic systolic congestive heart failure (5/2018 EF 35-40%), hx of syncope and 2:1 HB with bradycardia with left sided dual chamber MDT AICD (DDD ) in May 2018 [facility] (David Mahmood), CVA with right sided weakness (approx June 2019, date unsure) who presents to [facility]-ED for chest pain. Pt states that for past 3 days he has been experiencing chest pain, on and off and episodes lasting for about 10 minutes. Chest Pain described as pressure 6/10, non raidiating, no pain modifying factors. no sob. no diaphoresis, no abd. pain. no n/v/d. no syncopy or dizziness reported. Pt also admits to associated symptoms of intermittently palpitations. Pt took his BP last night and reading was 171/113. Pain worsened today so pt GF brought him to ED. In ED, ECG-NSR HR @ 68 with T-wave abnormalities in inferior lateral leads, INR-4.74 , Trop#1-negative. Pt bedside RN stated that pt had two runs of NSVT one 12 beats one 14 beats. Pt given nitro paste in the ER, no sig. chest pain at the time of admission. pt. did not take any coumadin today. As per history pt. has stopped taking palvix , reason ?

## 2020-09-02 NOTE — H&P ADULT - NSHPLABSRESULTS_GEN_ALL_CORE
cxr reviewed by me probable cardiomegaly, aicd, no obvious infiltrate , pl. effusion, follow radiologist read.

## 2020-09-03 LAB
ANION GAP SERPL CALC-SCNC: 13 MMOL/L — SIGNIFICANT CHANGE UP (ref 5–17)
APTT BLD: 62.5 SEC — HIGH (ref 27.5–35.5)
BUN SERPL-MCNC: 8 MG/DL — SIGNIFICANT CHANGE UP (ref 8–20)
CALCIUM SERPL-MCNC: 9.5 MG/DL — SIGNIFICANT CHANGE UP (ref 8.6–10.2)
CHLORIDE SERPL-SCNC: 102 MMOL/L — SIGNIFICANT CHANGE UP (ref 98–107)
CO2 SERPL-SCNC: 29 MMOL/L — SIGNIFICANT CHANGE UP (ref 22–29)
CREAT SERPL-MCNC: 0.86 MG/DL — SIGNIFICANT CHANGE UP (ref 0.5–1.3)
GLUCOSE SERPL-MCNC: 88 MG/DL — SIGNIFICANT CHANGE UP (ref 70–99)
INR BLD: 4.45 RATIO — HIGH (ref 0.88–1.16)
POTASSIUM SERPL-MCNC: 4.3 MMOL/L — SIGNIFICANT CHANGE UP (ref 3.5–5.3)
POTASSIUM SERPL-SCNC: 4.3 MMOL/L — SIGNIFICANT CHANGE UP (ref 3.5–5.3)
PROTHROM AB SERPL-ACNC: 48.1 SEC — HIGH (ref 10.6–13.6)
SARS-COV-2 IGG SERPL QL IA: NEGATIVE — SIGNIFICANT CHANGE UP
SARS-COV-2 IGM SERPL IA-ACNC: <3.8 AU/ML — SIGNIFICANT CHANGE UP
SODIUM SERPL-SCNC: 144 MMOL/L — SIGNIFICANT CHANGE UP (ref 135–145)
TROPONIN T SERPL-MCNC: <0.01 NG/ML — SIGNIFICANT CHANGE UP (ref 0–0.06)
TROPONIN T SERPL-MCNC: <0.01 NG/ML — SIGNIFICANT CHANGE UP (ref 0–0.06)

## 2020-09-03 PROCEDURE — 99233 SBSQ HOSP IP/OBS HIGH 50: CPT

## 2020-09-03 PROCEDURE — 93010 ELECTROCARDIOGRAM REPORT: CPT

## 2020-09-03 RX ORDER — PHYTONADIONE (VIT K1) 5 MG
2.5 TABLET ORAL ONCE
Refills: 0 | Status: COMPLETED | OUTPATIENT
Start: 2020-09-03 | End: 2020-09-03

## 2020-09-03 RX ORDER — SPIRONOLACTONE 25 MG/1
25 TABLET, FILM COATED ORAL DAILY
Refills: 0 | Status: DISCONTINUED | OUTPATIENT
Start: 2020-09-03 | End: 2020-09-04

## 2020-09-03 RX ADMIN — CARVEDILOL PHOSPHATE 12.5 MILLIGRAM(S): 80 CAPSULE, EXTENDED RELEASE ORAL at 17:22

## 2020-09-03 RX ADMIN — CARVEDILOL PHOSPHATE 12.5 MILLIGRAM(S): 80 CAPSULE, EXTENDED RELEASE ORAL at 06:24

## 2020-09-03 RX ADMIN — ATORVASTATIN CALCIUM 80 MILLIGRAM(S): 80 TABLET, FILM COATED ORAL at 21:59

## 2020-09-03 RX ADMIN — Medication 0.4 MILLIGRAM(S): at 10:25

## 2020-09-03 RX ADMIN — ISOSORBIDE MONONITRATE 30 MILLIGRAM(S): 60 TABLET, EXTENDED RELEASE ORAL at 11:10

## 2020-09-03 RX ADMIN — Medication 81 MILLIGRAM(S): at 11:09

## 2020-09-03 RX ADMIN — Medication 2.5 MILLIGRAM(S): at 17:22

## 2020-09-03 RX ADMIN — CLOPIDOGREL BISULFATE 75 MILLIGRAM(S): 75 TABLET, FILM COATED ORAL at 11:10

## 2020-09-03 RX ADMIN — Medication 1 INCH(S): at 06:26

## 2020-09-03 RX ADMIN — LISINOPRIL 20 MILLIGRAM(S): 2.5 TABLET ORAL at 06:24

## 2020-09-03 NOTE — PROGRESS NOTE ADULT - SUBJECTIVE AND OBJECTIVE BOX
Patient is a 50y old  Male who presents with a chief complaint of chest pain (03 Sep 2020 08:45)      INTERVAL HPI/OVERNIGHT EVENTS: seen and examined in ED. Still has pain on and off.   INR elevated     MEDICATIONS  (STANDING):  aspirin  chewable 81 milliGRAM(s) Oral daily  atorvastatin 80 milliGRAM(s) Oral at bedtime  carvedilol 12.5 milliGRAM(s) Oral every 12 hours  clopidogrel Tablet 75 milliGRAM(s) Oral daily  isosorbide   mononitrate ER Tablet (IMDUR) 30 milliGRAM(s) Oral daily  lisinopril 20 milliGRAM(s) Oral daily  spironolactone 25 milliGRAM(s) Oral daily    MEDICATIONS  (PRN):  nitroglycerin     SubLingual 0.4 milliGRAM(s) SubLingual every 5 minutes PRN Chest Pain      Allergies    No Known Allergies    Intolerances        REVIEW OF SYSTEMS:  CONSTITUTIONAL: No fever, weight loss, or fatigue  RESPIRATORY: No cough, wheezing, chills or hemoptysis; No shortness of breath  CARDIOVASCULAR: No chest pain, palpitations, dizziness, or leg swelling  GASTROINTESTINAL: No abdominal or epigastric pain. No nausea, vomiting, or hematemesis; No diarrhea or constipation. No melena or hematochezia.  NEUROLOGICAL: No headaches, memory loss, loss of strength, numbness, or tremors  MUSCULOSKELETAL: No joint pain or swelling; No muscle, back, or extremity pain      Vital Signs Last 24 Hrs  T(C): 36.6 (03 Sep 2020 15:21), Max: 37.3 (02 Sep 2020 16:05)  T(F): 97.8 (03 Sep 2020 15:21), Max: 99.1 (02 Sep 2020 16:05)  HR: 62 (03 Sep 2020 15:21) (59 - 66)  BP: 113/74 (03 Sep 2020 15:21) (113/74 - 149/91)  BP(mean): 95 (03 Sep 2020 05:00) (95 - 95)  RR: 18 (03 Sep 2020 15:21) (18 - 18)  SpO2: 94% (03 Sep 2020 15:21) (94% - 99%)    PHYSICAL EXAM:  GENERAL: NAD, well-groomed, well-developed  HEAD:  Atraumatic, Normocephalic  EYES: EOMI, PERRLA, conjunctiva and sclera clear  NECK: Supple, No JVD, Normal thyroid  NERVOUS SYSTEM:  Alert & Oriented X3, No gross focal deficits  CHEST/LUNG: Clear to percussion bilaterally; No rales, rhonchi, wheezing, or rubs  HEART: Regular rate and rhythm; (+) murmurs, no rubs, or gallops  ABDOMEN: Soft, Nontender, Nondistended; Bowel sounds present  EXTREMITIES:  No clubbing, cyanosis, or edema  SKIN: No rashes or lesions    LABS:                        16.0   6.47  )-----------( 189      ( 02 Sep 2020 17:54 )             49.0         144  |  102  |  8.0  ----------------------------<  88  4.3   |  29.0  |  0.86    Ca    9.5      03 Sep 2020 09:40  Mg     2.1         TPro  7.4  /  Alb  4.6  /  TBili  0.4  /  DBili  x   /  AST  23  /  ALT  23  /  AlkPhos  82  -02    PT/INR - ( 03 Sep 2020 03:22 )   PT: 48.1 sec;   INR: 4.45 ratio         PTT - ( 03 Sep 2020 03:22 )  PTT:62.5 sec  Urinalysis Basic - ( 02 Sep 2020 22:13 )    Color: Yellow / Appearance: Clear / S.015 / pH: x  Gluc: x / Ketone: Negative  / Bili: Negative / Urobili: Negative mg/dL   Blood: x / Protein: Negative mg/dL / Nitrite: Negative   Leuk Esterase: Negative / RBC: 0-2 /HPF / WBC Negative   Sq Epi: x / Non Sq Epi: Occasional / Bacteria: x      CAPILLARY BLOOD GLUCOSE          RADIOLOGY & ADDITIONAL TESTS:    Imaging Personally Reviewed:  [ ] YES  [ ] NO    Consultant(s) Notes Reviewed:  [ ] YES  [ ] NO    Care Discussed with Consultants/Other Providers [ ] YES  [ ] NO    Plan of Care discussed with Housestaff [ ]YES [ ] NO

## 2020-09-03 NOTE — PROGRESS NOTE ADULT - ASSESSMENT
50M [place] Creole speaking with history of CAD with multiple stents to RCA, circ. and LAD (most recent 11/2019), MI in 9/2017 at [facility] with recanalization of RCA and LAD restenosis with a mid RCA PEPE placement, apical thrombus in 2017 (on warfarin),Chronic systolic congestive heart failure (5/2018 EF 35-40%), hx of syncope and 2:1 HB with bradycardia with left sided dual chamber MDT AICD (DDD ) in May 2018 [facility] (David Mahmood), CVA with right sided weakness (approx June 2019), last cath 11/2019 with PCI/stent to mLCx at [facility], was supposedly be off warfarin and remains on DAPT, however unclear reason discontinued clopidogrel 75mg back in 7/2020 and now remains on warfarin, who presents to [facility] for chest pain. Pt states that for past 3 days he has been experiencing chest pain. Chest Pain described as pressure 6/10, non radiating no pain modifying factors. Pt also admits to associated symptoms of intermittently palpitations. Pt took his BP last night and reading was 171/113. Pain worsened today so pt GF brought him to ED. In ED, ECG-NSR HR @ 68 with T-wave abnormalities in inferior lateral leads, INR-4.74 , Trop#1-negative. Upon assessment pt still c/o of chest pain. Pt bedside RN stated that pt had two runs of NSVT one 12 beats one 14 beats. Pt given nitro paste as ordered. Physical exam otherwise benign. Pt admits to stopping Plavix since June b/c he was told to.     Chest Pain suspect unstable angina  -INR remains elevated, given negative troponin and overall controlled chest pain will defer cardiac cath until INR <4 given bleeding risk; would avoid reversal with Vit K for now as prior history of LV apical thrombus with akinetic/aneursymal segment.   -continue ASA 81mg and clopidogrel 75mg with atorvastatin 80mg- given prior history of restenosis would probably need lifelong DAPT.   -continue Imdur and beta-blocker as antianginal.     HFrEF/ICM-   -compensated and euvolemic not requiring maintenance diuretic  -continue GDMT with carvedilol, lisinopril and will add spironolactone 25mg daily  -AICD interrogated yesterday without event    History of LV apical thrombus  -supposedly resolved since 11/2019 and was told to discontinue warfarin but unclear reason still remains on it, follow up official repeat TTE read.       Need to establish care with new cardiologist as his former cardiologist left the practice at [facility] and has no follow up >1 year.       Mulugeta Rush DO, [facility]  Faculty Non-Invasive Cardiologist  [phone]

## 2020-09-03 NOTE — PROGRESS NOTE ADULT - SUBJECTIVE AND OBJECTIVE BOX
[facility]                                                               Office: [address] [address]                                                              Telephone: [phone]. Fax:[phone]                                                                             PROGRESS NOTE  Reason for follow up: Chest pain, unstable angina  Overnight: No new events.   Update: [facility]  [id number]- patient reports still some mild chest pain intermittent comes and goes for 10-15 minutes, Troponin remains negative, no recurrent NSVT on telemetry. INR remains elevated 4.4 today, warfarin held since yesterday. TTE done prelim no evidence of LV apical thrombus.       Review of symptoms:   Cardiac:  +chest pain. No dyspnea. No palpitations.  Respiratory: No cough. No dyspnea  Gastrointestinal: No diarrhea. No abdominal pain. No bleeding.     Past medical history: No updates.   	  Vital Signs Last 24 Hrs  T(C): 36.5 (09-03-20 @ 07:30), Max: 37.3 (09-02-20 @ 16:05)  T(F): 97.7 (09-03-20 @ 07:30), Max: 99.1 (09-02-20 @ 16:05)  HR: 63 (09-03-20 @ 07:30) (59 - 70)  BP: 146/87 (09-03-20 @ 07:30) (119/73 - 149/91)  BP(mean): 95 (09-03-20 @ 05:00) (95 - 95)  RR: 18 (09-03-20 @ 07:30) (18 - 20)  SpO2: 96% (09-03-20 @ 07:30) (95% - 99%)  I&O's Summary        PHYSICAL EXAM:  Appearance: Comfortable. No acute distress  HEENT:  Head and neck: Atraumatic. Normocephalic.  Normal oral mucosa, PERRL, Neck is supple. No JVD, No carotid bruit.   Neurologic: A&Ox 3, no focal deficits. EOMI, Cranial nerves are intact.  Lymphatic: No cervical lymphadenopathy  Cardiovascular: Normal S1 S2, No murmur, rubs/gallops. No JVD, No edema  Respiratory: Lungs clear to auscultation  Gastrointestinal:  Soft, Non-tender, + BS  Lower Extremities: No edema  Psychiatry: Patient is calm. No agitation. Mood & affect appropriate  Skin: No rashes/ecchymoses/cyanosis/ulcers visualized on the face, hands or feet.      CURRENT MEDICATIONS:  MEDICATIONS  (STANDING):  aspirin  chewable 81 milliGRAM(s) Oral daily  atorvastatin 80 milliGRAM(s) Oral at bedtime  carvedilol 12.5 milliGRAM(s) Oral every 12 hours  clopidogrel Tablet 75 milliGRAM(s) Oral daily  isosorbide   mononitrate ER Tablet (IMDUR) 30 milliGRAM(s) Oral daily  lisinopril 20 milliGRAM(s) Oral daily    MEDICATIONS  (PRN):  nitroglycerin     SubLingual 0.4 milliGRAM(s) SubLingual every 5 minutes PRN Chest Pain      DIAGNOSTIC TESTING:  [ ] Echocardiogram:   < from: TTE with Doppler (w/Cont) (11.20.19 @ 09:46) >  Conclusions:  1. Thickened mitral valve. Minimal mitral regurgitation.  2. Calcified trileaflet aortic valve with normal opening.  Mild aortic regurgitation.  3. Moderate concentric left ventricular hypertrophy with  4. Endocardial visualization enhanced with intravenous  injection of Ultrasonic Enhancing Agent (Definity).  Moderate segmental left ventricular systolic dysfunction.  There is hyperdynamicmid-ventricular systolic function  with apical akinesis.  There is swirling of the Ultrasonic Enhancing Agent  (Definity) in the left ventricular apex indicating a region  of stasis, however a left ventricular thrombus is not seen.  May consider cardiac MRI to further rule out the presence  of a left ventricular thrombus.  5. Moderate diastolic dysfunction (Stage II).  6. Normal right ventricular size and function. A device  wire is noted in the right heart.    < end of copied text >    [ ]  Catheterization:  [ ] Stress Test:      Labs:                        16.0   6.47  )-----------( 189      ( 02 Sep 2020 17:54 )             49.0     09-02    142  |  103  |  9.0  ----------------------------<  84  3.7   |  26.0  |  0.85    Ca    9.2      02 Sep 2020 17:54  Mg     2.1     09-02    TPro  7.4  /  Alb  4.6  /  TBili  0.4  /  DBili  x   /  AST  23  /  ALT  23  /  AlkPhos  82  09-02     03 Sep 2020 02:53 Troponin <0.01 ng/mL / Creatine Kinase x     /  CKMB x     / CPK Mass Assay % x       02 Sep 2020 17:54 Troponin <0.01 ng/mL / Creatine Kinase 243 U/L /  CKMB 2.0 ng/mL / CPK Mass Assay % x          Serum Pro-Brain Natriuretic Peptide: 90 pg/mL (05-11-18 @ 15:25)      TELEMETRY: Sinus 60s, no ectopy   EKG- sinus 57, inferior and anterolatera infarct, TWI V3-V6, QTc 420

## 2020-09-03 NOTE — PROGRESS NOTE ADULT - ASSESSMENT
51 yo male with PMHs of CAD and stents presented with chest pain     1. Chest pain in patient with CAD and stents, concern for ACS   Serial troponins negative   Echo done and results reviewed   Plan for cardiac cath tomorrow if INR is <3   c/w Aspirin, plavix, atorvastatin and imdur     2. Elevated INR   INR today 4.5  Will give a small dose of Vitamin K 2.5 mg oral   Follow INR in AM     3. Hx of CHF: not in exacerbation   c/w Carvedilol, Lisinopril, Spironolactone     4. HTN: BP controlled on meds above   5. DVT prophylaxis: Enoxaparin     Plan discussed with patient in detail

## 2020-09-04 ENCOUNTER — TRANSCRIPTION ENCOUNTER (OUTPATIENT)
Age: 50
End: 2020-09-04

## 2020-09-04 VITALS — DIASTOLIC BLOOD PRESSURE: 82 MMHG | SYSTOLIC BLOOD PRESSURE: 110 MMHG | HEART RATE: 65 BPM

## 2020-09-04 LAB
CHOLEST SERPL-MCNC: 105 MG/DL — LOW (ref 110–199)
HDLC SERPL-MCNC: 39 MG/DL — LOW
INR BLD: 2.26 RATIO — HIGH (ref 0.88–1.16)
LIPID PNL WITH DIRECT LDL SERPL: 54 MG/DL — SIGNIFICANT CHANGE UP
PROTHROM AB SERPL-ACNC: 25.2 SEC — HIGH (ref 10.6–13.6)
TOTAL CHOLESTEROL/HDL RATIO MEASUREMENT: 3 RATIO — LOW (ref 3.4–9.6)
TRIGL SERPL-MCNC: 58 MG/DL — SIGNIFICANT CHANGE UP (ref 10–200)

## 2020-09-04 PROCEDURE — C1894: CPT

## 2020-09-04 PROCEDURE — 93454 CORONARY ARTERY ANGIO S&I: CPT | Mod: 26

## 2020-09-04 PROCEDURE — 93010 ELECTROCARDIOGRAM REPORT: CPT | Mod: 76

## 2020-09-04 PROCEDURE — 99239 HOSP IP/OBS DSCHRG MGMT >30: CPT

## 2020-09-04 PROCEDURE — 80048 BASIC METABOLIC PNL TOTAL CA: CPT

## 2020-09-04 PROCEDURE — 99233 SBSQ HOSP IP/OBS HIGH 50: CPT

## 2020-09-04 PROCEDURE — C8929: CPT

## 2020-09-04 PROCEDURE — 93005 ELECTROCARDIOGRAM TRACING: CPT

## 2020-09-04 PROCEDURE — 82553 CREATINE MB FRACTION: CPT

## 2020-09-04 PROCEDURE — 83735 ASSAY OF MAGNESIUM: CPT

## 2020-09-04 PROCEDURE — 80053 COMPREHEN METABOLIC PANEL: CPT

## 2020-09-04 PROCEDURE — 85610 PROTHROMBIN TIME: CPT

## 2020-09-04 PROCEDURE — 80061 LIPID PANEL: CPT

## 2020-09-04 PROCEDURE — 81001 URINALYSIS AUTO W/SCOPE: CPT

## 2020-09-04 PROCEDURE — 84484 ASSAY OF TROPONIN QUANT: CPT

## 2020-09-04 PROCEDURE — 93454 CORONARY ARTERY ANGIO S&I: CPT

## 2020-09-04 PROCEDURE — 82550 ASSAY OF CK (CPK): CPT

## 2020-09-04 PROCEDURE — C1769: CPT

## 2020-09-04 PROCEDURE — 85027 COMPLETE CBC AUTOMATED: CPT

## 2020-09-04 PROCEDURE — 99232 SBSQ HOSP IP/OBS MODERATE 35: CPT | Mod: 25

## 2020-09-04 PROCEDURE — 99285 EMERGENCY DEPT VISIT HI MDM: CPT

## 2020-09-04 PROCEDURE — 87635 SARS-COV-2 COVID-19 AMP PRB: CPT

## 2020-09-04 PROCEDURE — 36415 COLL VENOUS BLD VENIPUNCTURE: CPT

## 2020-09-04 PROCEDURE — C1887: CPT

## 2020-09-04 PROCEDURE — 86769 SARS-COV-2 COVID-19 ANTIBODY: CPT

## 2020-09-04 PROCEDURE — 71045 X-RAY EXAM CHEST 1 VIEW: CPT

## 2020-09-04 PROCEDURE — 85730 THROMBOPLASTIN TIME PARTIAL: CPT

## 2020-09-04 RX ORDER — ATORVASTATIN CALCIUM 80 MG/1
1 TABLET, FILM COATED ORAL
Qty: 90 | Refills: 3
Start: 2020-09-04 | End: 2021-08-29

## 2020-09-04 RX ORDER — ROSUVASTATIN CALCIUM 5 MG/1
1 TABLET ORAL
Qty: 0 | Refills: 0 | DISCHARGE

## 2020-09-04 RX ORDER — INFLUENZA VIRUS VACCINE 15; 15; 15; 15 UG/.5ML; UG/.5ML; UG/.5ML; UG/.5ML
0.5 SUSPENSION INTRAMUSCULAR ONCE
Refills: 0 | Status: COMPLETED | OUTPATIENT
Start: 2020-09-04 | End: 2020-09-04

## 2020-09-04 RX ORDER — SPIRONOLACTONE 25 MG/1
1 TABLET, FILM COATED ORAL
Qty: 30 | Refills: 3
Start: 2020-09-04 | End: 2021-01-01

## 2020-09-04 RX ORDER — ISOSORBIDE MONONITRATE 60 MG/1
1 TABLET, EXTENDED RELEASE ORAL
Qty: 90 | Refills: 3
Start: 2020-09-04 | End: 2021-08-29

## 2020-09-04 RX ORDER — CLOPIDOGREL BISULFATE 75 MG/1
1 TABLET, FILM COATED ORAL
Qty: 90 | Refills: 4
Start: 2020-09-04 | End: 2021-11-27

## 2020-09-04 RX ORDER — ISOSORBIDE MONONITRATE 60 MG/1
120 TABLET, EXTENDED RELEASE ORAL DAILY
Refills: 0 | Status: DISCONTINUED | OUTPATIENT
Start: 2020-09-04 | End: 2020-09-04

## 2020-09-04 RX ADMIN — CLOPIDOGREL BISULFATE 75 MILLIGRAM(S): 75 TABLET, FILM COATED ORAL at 06:18

## 2020-09-04 RX ADMIN — ISOSORBIDE MONONITRATE 120 MILLIGRAM(S): 60 TABLET, EXTENDED RELEASE ORAL at 12:50

## 2020-09-04 RX ADMIN — SPIRONOLACTONE 25 MILLIGRAM(S): 25 TABLET, FILM COATED ORAL at 05:20

## 2020-09-04 RX ADMIN — CARVEDILOL PHOSPHATE 12.5 MILLIGRAM(S): 80 CAPSULE, EXTENDED RELEASE ORAL at 05:20

## 2020-09-04 RX ADMIN — LISINOPRIL 20 MILLIGRAM(S): 2.5 TABLET ORAL at 05:19

## 2020-09-04 RX ADMIN — Medication 81 MILLIGRAM(S): at 06:17

## 2020-09-04 RX ADMIN — CARVEDILOL PHOSPHATE 12.5 MILLIGRAM(S): 80 CAPSULE, EXTENDED RELEASE ORAL at 16:55

## 2020-09-04 NOTE — PROGRESS NOTE ADULT - SUBJECTIVE AND OBJECTIVE BOX
S/p coronary angiogram.     LM- normal.   LAD- diffuse distal LAD instent restenosis.   Lcx- patent stent.   RCA- patent stent.     Diffuse LAD stenosis in a small caliber stent.   Would plan for aggressive medical management.     If needed, consider atherectomy, pci but will require 40-50mm of stent in a small caliber vessel.

## 2020-09-04 NOTE — PROGRESS NOTE ADULT - SUBJECTIVE AND OBJECTIVE BOX
Department of Cardiology                                                    [facility]/[place] [address]  [place]                                               Telephone: [phone]. Fax:[phone]                                                          Post Procedure Cardiac Cath NP Note       Narrative:    51 y/o male with medical history of CAD with multiple stents to RCA, circ. and LAD (most recent 11/2019), MI in 9/2017 at [facility] with recanalization of RCA and LAD restenosis with a mid RCA PEPE placement, apical thrombus in 2017 (on coumadin),Chronic systolic congestive heart failure (5/2018 EF 35-40%), hx of syncope and 2:1 HB with bradycardia with left sided dual chamber MDT AICD (DDD ) in May 2018 [facility] (David Mahmood), CVA with right sided weakness (approx June 2019, date unsure) who presents to [facility]ED for chest pain. Chest Pain described as pressure 6/10, non radiating no pain modifying factors. Pt also admits to associated symptoms of intermittently palpitations. Last TTE in 11/2019 without obvious LV apical thrombus but unclear remains on it with elevated INR, came in with typical angina x3 days, noted NSVT on telemetry but 1st Troponin and EKG unremarkable, AICD interrogated without recent event. Repeat echo 9/2 with LVEF 55 - 60%, normal global left ventricular systolic function, and multiple left ventricular regional wall motion abnormalities, moderate concentric LVH, mild MR. Denies chest pain, sob, syncope during exam. Given patients symptoms and strong cardiac history he is now s/p LHC today.       s/p left heart catheterization via right radial approach Dr. Escobar which showed:  CAD with patent stents   Medications used: IV Versed 1 mg, Verapamil 5 mg, Heparin 4,000 units  Contrast used:   Omnipaque 51 ml   RRA with radial band in situ  no complications       PAST MEDICAL & SURGICAL HISTORY:  Status post CVA: residual rt. lower ext weakness.  Former smoker  Heart block: 2:1 HB   dual chamber AICD (DDD ) in May 2018 [facility] (David Mahmood)  CAD (coronary artery disease)  Stented coronary artery  Thrombosis: apical  MI (myocardial infarction): 2 coronary stents  HTN (hypertension)  Cardiac defibrillator in place: 2017  Medtronic Visa AF MRI VR Surescan ICD   Model# [id number], SN# [id number]    FAMILY HISTORY:  Family history of MI (myocardial infarction): father at age 66.    Home Medications:  aspirin 81 mg oral delayed release tablet: 1 tab(s) orally once a day  buy over the counter from pharmacy (20 Nov 2019 04:50)  carvedilol 12.5 mg oral tablet: 1 tab(s) orally 2 times a day  home (19 Nov 2019 15:15)  clopidogrel 75 mg oral tablet: 1 tab(s) orally once a day (20 Nov 2019 16:25)  Crestor 40 mg oral tablet: 1 tab(s) orally once a day (19 Nov 2019 15:15)  gabapentin 100 mg oral tablet: orally once a day (at bedtime) (19 Nov 2019 15:15)  lisinopril 20 mg oral tablet: 1 tab(s) orally once a day  reports taking half a pill 2/2 hypotension at home (19 Nov 2019 15:15)  warfarin 4 mg oral tablet: 1 tab(s) orally once a day (20 Nov 2019 13:48)                         16.0   6.47  )-----------( 189      ( 02 Sep 2020 17:54 )             49.0     09-03    144  |  102  |  8.0  ----------------------------<  88  4.3   |  29.0  |  0.86    Ca    9.5      03 Sep 2020 09:40  Mg     2.1     09-02    TPro  7.4  /  Alb  4.6  /  TBili  0.4  /  DBili  x   /  AST  23  /  ALT  23  /  AlkPhos  82  09-02    PT/INR - ( 04 Sep 2020 06:10 )   PT: 25.2 sec;   INR: 2.26 ratio    PTT - ( 03 Sep 2020 03:22 )  PTT:62.5 sec      General: No fatigue, no fevers/chills  Respiratory: No dyspnea, no cough, no wheeze  CV: No chest pain, no palpitations  Abd: No nausea  Neuro: No headache, no dizziness  No Known Allergies      Objective:  Vital Signs Last 24 Hrs  T(C): 36.9 (04 Sep 2020 06:18), Max: 36.9 (04 Sep 2020 05:00)  T(F): 98.4 (04 Sep 2020 06:18), Max: 98.4 (04 Sep 2020 05:00)  HR: 55 (04 Sep 2020 09:15) (55 - 69)  BP: 142/88 (04 Sep 2020 09:15) (113/74 - 171/97)  BP(mean): --  RR: 16 (04 Sep 2020 09:15) (16 - 18)  SpO2: 96% (04 Sep 2020 09:15) (94% - 98%)      CM: SR  Neuro: A&OX3, CN 2-12 intact  HEENT: NC, AT  Lungs: CTA B/L  CV: S1, S2, no murmur, RRR  Abd: Soft  Right Groin: Soft, no bleeding, no hematoma  Extremity: + distal pulses  EKG:   Normal Sinus Rhythm with 1st degree AV block       ASSESSMENT AND PLAN:      51 y/o male with medical history of CAD with multiple stents to RCA, circ. and LAD (most recent 11/2019), MI in 9/2017 at [facility] with recanalization of RCA and LAD restenosis with a mid RCA PEPE placement, apical thrombus in 2017 (on coumadin),Chronic systolic congestive heart failure (5/2018 EF 35-40%), hx of syncope and 2:1 HB with bradycardia with left sided dual chamber MDT AICD (DDD ) in May 2018 [facility] (Dr. Crook [place]), CVA with right sided weakness (approx June 2019, date unsure) who presents to [facility]ED for chest pain. Chest Pain described as pressure 6/10, non raidiating, no pain modifying factors. Pt also admits to associated symptoms of intermittently palpitations. Last TTE in 11/2019 without obvious LV apical thrombus but unclear remains on it with elevated INR, came in with typical angina x3 days.  Patient is now s/p LHC via R-radial, found with patent LCx stent, mild restenosis of the RCA stent and also moderate-severe restenosis of the mid-distal LAD stent, given small caliber of the stent recommended for medical management.        -post cardiac cath orders  - right radial precautions  - continue current medical therapy  - Department of Cardiology                                                    [facility]/[place] [address] [place]                                               Telephone: [phone]. Fax:[phone]                                                    Post Procedure Cardiac Cath NP Note       Narrative:    51 y/o male with medical history of CAD with multiple stents to RCA, circ. and LAD (most recent 11/2019), MI in 9/2017 at [facility] with recanalization of RCA and LAD restenosis with a mid RCA PEPE placement, apical thrombus in 2017 (on coumadin),Chronic systolic congestive heart failure (5/2018 EF 35-40%), hx of syncope and 2:1 HB with bradycardia with left sided dual chamber MDT AICD (DDD ) in May 2018 [facility] (David Mahmood), CVA with right sided weakness (approx June 2019, date unsure) who presents to [facility]ED for chest pain. Chest Pain described as pressure 6/10, non radiating no pain modifying factors. Pt also admits to associated symptoms of intermittently palpitations. Last TTE in 11/2019 without obvious LV apical thrombus but unclear remains on it with elevated INR, came in with typical angina x3 days, noted NSVT on telemetry but 1st Troponin and EKG unremarkable, AICD interrogated without recent event. Repeat echo 9/2 with LVEF 55 - 60%, normal global left ventricular systolic function, and multiple left ventricular regional wall motion abnormalities, moderate concentric LVH, mild MR. Denies chest pain, sob, syncope during exam. Given patients symptoms and strong cardiac history he is now s/p LHC today.       s/p left heart catheterization via right radial approach Dr. Escobar which showed:  CAD: patent LCx stent, mild restenosis of RCA stent, mod-severe restenosis of mid-distal LAD stent, small caliber   Medications used: IV Versed 1 mg, Verapamil 5 mg, Heparin 4,000 units  Contrast used:   Omnipaque 51 ml   RRA with radial band in situ  no complications       PAST MEDICAL & SURGICAL HISTORY:  Status post CVA: residual rt. lower ext weakness.  Former smoker  Heart block: 2:1 HB   dual chamber AICD (DDD ) in May 2018 [facility] (David Mahmood)  CAD (coronary artery disease)  Stented coronary artery  Thrombosis: apical  MI (myocardial infarction): 2 coronary stents  HTN (hypertension)  Cardiac defibrillator in place: 2017  Medtronic Visa AF MRI VR Surescan ICD   Model# [id number], SN# [id number]    FAMILY HISTORY:  Family history of MI (myocardial infarction): father at age 66.    Home Medications:  aspirin 81 mg oral delayed release tablet: 1 tab(s) orally once a day  buy over the counter from pharmacy (20 Nov 2019 04:50)  carvedilol 12.5 mg oral tablet: 1 tab(s) orally 2 times a day  home (19 Nov 2019 15:15)  clopidogrel 75 mg oral tablet: 1 tab(s) orally once a day (20 Nov 2019 16:25)  Crestor 40 mg oral tablet: 1 tab(s) orally once a day (19 Nov 2019 15:15)  gabapentin 100 mg oral tablet: orally once a day (at bedtime) (19 Nov 2019 15:15)  lisinopril 20 mg oral tablet: 1 tab(s) orally once a day  reports taking half a pill 2/2 hypotension at home (19 Nov 2019 15:15)  warfarin 4 mg oral tablet: 1 tab(s) orally once a day (20 Nov 2019 13:48)                         16.0   6.47  )-----------( 189      ( 02 Sep 2020 17:54 )             49.0     09-03    144  |  102  |  8.0  ----------------------------<  88  4.3   |  29.0  |  0.86    Ca    9.5      03 Sep 2020 09:40  Mg     2.1     09-02    TPro  7.4  /  Alb  4.6  /  TBili  0.4  /  DBili  x   /  AST  23  /  ALT  23  /  AlkPhos  82  09-02    PT/INR - ( 04 Sep 2020 06:10 )   PT: 25.2 sec;   INR: 2.26 ratio    PTT - ( 03 Sep 2020 03:22 )  PTT:62.5 sec      General: No fatigue, no fevers/chills  Respiratory: No dyspnea, no cough, no wheeze  CV: No chest pain, no palpitations  Abd: No nausea  Neuro: No headache, no dizziness  No Known Allergies      Objective:  Vital Signs Last 24 Hrs  T(C): 36.9 (04 Sep 2020 06:18), Max: 36.9 (04 Sep 2020 05:00)  T(F): 98.4 (04 Sep 2020 06:18), Max: 98.4 (04 Sep 2020 05:00)  HR: 55 (04 Sep 2020 09:15) (55 - 69)  BP: 142/88 (04 Sep 2020 09:15) (113/74 - 171/97)  BP(mean): --  RR: 16 (04 Sep 2020 09:15) (16 - 18)  SpO2: 96% (04 Sep 2020 09:15) (94% - 98%)      CM: SR  Neuro: A&OX3, CN 2-12 intact  HEENT: NC, AT  Lungs: CTA B/L  CV: S1, S2, no murmur, RRR  Abd: Soft  Right Groin: Soft, no bleeding, no hematoma  Extremity: + distal pulses  EKG:   Normal Sinus Rhythm with 1st degree AV block     TELEMETRY:   NSR    DIAGNOSTICS:     < from: TTE Echo Complete w/ Contrast w/ Doppler (09.02.20 @ 18:42) >  Summary:   1. Endocardial visualization was enhanced with intravenous echo contrast.   2. Left ventricular ejection fraction, by visual estimation, is 55 to 60%.   3. Normal global left ventricular systolic function.   4. Multiple left ventricular regional wall motion abnormalities exist. See wall motion findings.   5. Normal left ventricular internal cavity size.   6. There is moderate concentric left ventricular hypertrophy.   7. Normal left atrial size.   8. Normal right atrial size.   9. Mild thickening of the anterior and posterior mitral valve leaflets.  10. Trace mitral valve regurgitation.  11. Sclerotic aortic valve with normal opening.  12. There is no evidence of pericardial effusion.    MD Adrienne Electronically signed on 9/3/2020 at 9:40:24 AM    *** Final ***    MILLA HASTINGS   This document has been electronically signed. Sep  2 2020  6:42PM    < end of copied text >      CARDIAC CATH 9/4/20 PENDING FINAL REPORT   9/4/20- prelim cath report: patent LCx stent, mild restenosis of RCA stent, mod-severe restenosis of mid-distal LAD stent, small caliber       ASSESSMENT AND PLAN:      51 y/o male with medical history of CAD with multiple stents to RCA, circ. and LAD (most recent 11/2019), MI in 9/2017 at [facility] with recanalization of RCA and LAD restenosis with a mid RCA PEPE placement, apical thrombus in 2017 (on coumadin),Chronic systolic congestive heart failure (5/2018 EF 35-40%), hx of syncope and 2:1 HB with bradycardia with left sided dual chamber MDT AICD (DDD ) in May 2018 [facility] (Dr. Crook, [place]), CVA with right sided weakness (approx June 2019, date unsure) who presents to [facility]ED for chest pain. Chest Pain described as pressure 6/10, non raidiating, no pain modifying factors. Pt also admits to associated symptoms of intermittently palpitations. Last TTE in 11/2019 without obvious LV apical thrombus but unclear remains on it with elevated INR, came in with typical angina x3 days.  Patient is now s/p LHC via R-radial, found with patent LCx stent, mild restenosis of the RCA stent and also moderate-severe restenosis of the mid-distal LAD stent, given small caliber of the stent recommended for medical management.        -post cardiac cath orders  - right radial precautions  - in stent restenosis: increase Imdur to 120 mg daily and will add nitrate SL as needed per cards - Dr. Rush reccs  - continue GDMT with carvedilol, lisinopril, added spironolactone 25mg daily  - AICD interrogated yesterday without event  - apical and distal segments akinesis/aneursymal will need repeat TTE in 2-3 months off warfarin to ensure no recurrence of LV apical thrombus per cards Dr. Rush    - patient can establish care with Dr. Rush ([facility]) in the office in 2 weeks (office to call an schedule appointment/ pt to call to schedule appt)

## 2020-09-04 NOTE — DISCHARGE NOTE PROVIDER - NSDCACTIVITY_GEN_ALL_CORE
Do not drive or operate machinery/Walking - Outdoors allowed/Do not make important decisions/Showering allowed/Stairs allowed/Walking - Indoors allowed

## 2020-09-04 NOTE — DISCHARGE NOTE NURSING/CASE MANAGEMENT/SOCIAL WORK - PATIENT PORTAL LINK FT
You can access the FollowMyHealth Patient Portal offered by [facility] by registering at the following website: [email or web address]. By joining [facility]’s FollowMyHealth portal, you will also be able to view your health information using other applications (apps) compatible with our system.

## 2020-09-04 NOTE — DISCHARGE NOTE PROVIDER - HOSPITAL COURSE
51 y/o male with medical history of CAD with multiple stents to RCA, circ. and LAD (most recent 11/2019), MI in 9/2017 at [facility] with recanalization of RCA and LAD restenosis with a mid RCA PEPE placement, apical thrombus in 2017 (on coumadin),Chronic systolic congestive heart failure (5/2018 EF 35-40%), hx of syncope and 2:1 HB with bradycardia with left sided dual chamber MDT AICD (DDD ) in May 2018 [facility] (Dr. Crook [place]), CVA with right sided weakness (approx June 2019, date unsure) who presents to [facility] for chest pain. Chest Pain described as pressure 6/10, non radiating no pain modifying factors. Pt also admits to associated symptoms of intermittently palpitations. Last TTE in 11/2019 without obvious LV apical thrombus but unclear remains on it with elevated INR, came in with typical angina x3 days, noted NSVT on telemetry but 1st Troponin and EKG unremarkable, AICD interrogated without recent event. Repeat echo 9/2 with LVEF 55 - 60%, normal global left ventricular systolic function, and multiple left ventricular regional wall motion abnormalities, moderate concentric LVH, mild MR. Denies chest pain, sob, syncope during exam. Given patients symptoms and strong cardiac history he is now s/p LHC today.             s/p left heart catheterization via right radial approach Dr. Escobar which showed:    CAD: patent LCx stent, mild restenosis of RCA stent, mod-severe restenosis of mid-distal LAD stent, small caliber     Diffuse LAD stenosis in a small caliber stent.     Would plan for aggressive medical management    No complications

## 2020-09-04 NOTE — PROGRESS NOTE ADULT - SUBJECTIVE AND OBJECTIVE BOX
[facility]                                                               Office: [address] [place] [address]                                                              Telephone: [phone]. Fax:[phone]                                                                             PROGRESS NOTE  Reason for follow up: unstable angina  Overnight: No new events.   Update: INR down to 2.2 today and underwent LHC via R-radial, found with patent LCx stent, mild restenosis of the RCA stent and also moderate-severe restenosis of the mid-distal LAD stent, given small caliber of the stent recommended for medical management and if refractory to antianginal can consider future PCI/bradytherapy. He reports still with mild chest pain this morning but overall comfortable.   [place]  #[id number]      Review of symptoms:   Cardiac:  +mild chest pain. No dyspnea. No palpitations.  Respiratory: No cough. No dyspnea  Gastrointestinal: No diarrhea. No abdominal pain. No bleeding.     Past medical history: No updates.   	  Vital Signs Last 24 Hrs  T(C): 36.9 (09-04-20 @ 06:18), Max: 36.9 (09-04-20 @ 05:00)  T(F): 98.4 (09-04-20 @ 06:18), Max: 98.4 (09-04-20 @ 05:00)  HR: 55 (09-04-20 @ 09:15) (55 - 69)  BP: 142/88 (09-04-20 @ 09:15) (113/74 - 171/97)  BP(mean): --  RR: 16 (09-04-20 @ 09:15) (16 - 18)  SpO2: 96% (09-04-20 @ 09:15) (94% - 98%)  I&O's Summary        PHYSICAL EXAM:  Appearance: Comfortable. No acute distress  HEENT:  Head and neck: Atraumatic. Normocephalic.  Normal oral mucosa, PERRL, Neck is supple. No JVD, No carotid bruit.   Neurologic: A&Ox 3, no focal deficits. EOMI, Cranial nerves are intact.  Lymphatic: No cervical lymphadenopathy  Cardiovascular: Normal S1 S2, No murmur, rubs/gallops. No JVD, No edema  Respiratory: Lungs clear to auscultation  Gastrointestinal:  Soft, Non-tender, + BS  Lower Extremities: No edema, R-radial band  Psychiatry: Patient is calm. No agitation. Mood & affect appropriate  Skin: No rashes/ecchymoses/cyanosis/ulcers visualized on the face, hands or feet.      CURRENT MEDICATIONS:  MEDICATIONS  (STANDING):  aspirin  chewable 81 milliGRAM(s) Oral daily  atorvastatin 80 milliGRAM(s) Oral at bedtime  carvedilol 12.5 milliGRAM(s) Oral every 12 hours  clopidogrel Tablet 75 milliGRAM(s) Oral daily  influenza   Vaccine 0.5 milliLiter(s) IntraMuscular once  isosorbide   mononitrate ER Tablet (IMDUR) 120 milliGRAM(s) Oral daily  lisinopril 20 milliGRAM(s) Oral daily  spironolactone 25 milliGRAM(s) Oral daily    MEDICATIONS  (PRN):  nitroglycerin     SubLingual 0.4 milliGRAM(s) SubLingual every 5 minutes PRN Chest Pain      DIAGNOSTIC TESTING:  [ ] Echocardiogram:   < from: TTE Echo Complete w/ Contrast w/ Doppler (09.02.20 @ 18:42) >    Summary:   1. Endocardial visualization was enhanced with intravenous echo contrast.   2. Left ventricular ejection fraction, by visual estimation, is 55 to 60%.   3. Normal global left ventricular systolic function.   4. Multiple left ventricular regional wall motion abnormalities exist. See wall motion findings.   5. Normal left ventricular internal cavity size.   6. There is moderate concentric left ventricular hypertrophy.   7. Normal left atrial size.   8. Normal right atrial size.   9. Mild thickening of the anterior and posterior mitral valve leaflets.  10. Trace mitral valve regurgitation.  11. Sclerotic aortic valve with normal opening.  12. There is no evidence of pericardial effusion.    < end of copied text >  < from: TTE with Doppler (w/Cont) (11.20.19 @ 09:46) >  Conclusions:  1. Thickened mitral valve. Minimal mitral regurgitation.  2. Calcified trileaflet aortic valve with normal opening.  Mild aortic regurgitation.  3. Moderate concentric left ventricular hypertrophy with  4. Endocardial visualization enhanced with intravenous  injection of Ultrasonic Enhancing Agent (Definity).  Moderate segmental left ventricular systolic dysfunction.  There is hyperdynamicmid-ventricular systolic function  with apical akinesis.  There is swirling of the Ultrasonic Enhancing Agent  (Definity) in the left ventricular apex indicating a region  of stasis, however a left ventricular thrombus is not seen.  May consider cardiac MRI to further rule out the presence  of a left ventricular thrombus.  5. Moderate diastolic dysfunction (Stage II).  6. Normal right ventricular size    < end of copied text >    [ ]  Catheterization:  9/4/20- prelim cath report: patent LCx stent, mild restenosis of RCA stent, mod-severe restenosis of mid-distal LAD stent, small caliber   [ ] Stress Test:      Labs:                        16.0   6.47  )-----------( 189      ( 02 Sep 2020 17:54 )             49.0     09-03    144  |  102  |  8.0  ----------------------------<  88  4.3   |  29.0  |  0.86    Ca    9.5      03 Sep 2020 09:40  Mg     2.1     09-02    TPro  7.4  /  Alb  4.6  /  TBili  0.4  /  DBili  x   /  AST  23  /  ALT  23  /  AlkPhos  82  09-02     03 Sep 2020 09:40 Troponin <0.01 ng/mL / Creatine Kinase x     /  CKMB x     / CPK Mass Assay % x       03 Sep 2020 02:53 Troponin <0.01 ng/mL / Creatine Kinase x     /  CKMB x     / CPK Mass Assay % x       02 Sep 2020 17:54 Troponin <0.01 ng/mL / Creatine Kinase 243 U/L /  CKMB 2.0 ng/mL / CPK Mass Assay % x          Serum Pro-Brain Natriuretic Peptide: 90 pg/mL (05-11-18 @ 15:25)    Cholesterol 105 mg/dL; Direct LDL 54 mg/dL; HDL Cholesterol, Serum 39 mg/dL; HDL/ Total Cholesterol Ratio Measurement --; Total Cholesterol/ HDL Ratio Measurement 3.0 Ratio; Triglycerides, Serum 58 mg/dL      TELEMETRY: Sinus

## 2020-09-04 NOTE — DISCHARGE NOTE PROVIDER - CARE PROVIDER_API CALL
Mulugeta Rush (DO)  Cardiology; Internal Medicine  [address] [facility], [place] [address]  Phone: [phone]  Fax: [phone]  Follow Up Time:

## 2020-09-04 NOTE — DISCHARGE NOTE PROVIDER - NSDCCPCAREPLAN_GEN_ALL_CORE_FT
PRINCIPAL DISCHARGE DIAGNOSIS  Diagnosis: Coronary artery disease with unstable angina pectoris, unspecified vessel or lesion type, unspecified whether native or transplanted heart  Assessment and Plan of Treatment: S/p coronary angiogram.   LM- normal.   LAD- diffuse distal LAD instent restenosis.   Lcx- patent stent.   RCA- patent stent.

## 2020-09-04 NOTE — PROGRESS NOTE ADULT - ASSESSMENT
50M [place] Creole speaking with history of CAD with multiple stents to RCA, circ. and LAD (most recent 11/2019), MI in 9/2017 at [facility] with recanalization of RCA and LAD restenosis with a mid RCA PEPE placement, apical thrombus in 2017 (on warfarin),Chronic systolic congestive heart failure (5/2018 EF 35-40%), hx of syncope and 2:1 HB with bradycardia with left sided dual chamber MDT AICD (DDD ) in May 2018 [facility] (David Mahmood), CVA with right sided weakness (approx June 2019), last cath 11/2019 with PCI/stent to mLCx at [facility], was supposedly be off warfarin and remains on DAPT, however unclear reason discontinued clopidogrel 75mg back in 7/2020 and now remains on warfarin, who presents to [facility]ED for chest pain. Pt states that for past 3 days he has been experiencing chest pain. Chest Pain described as pressure 6/10, non radiating no pain modifying factors. Pt also admits to associated symptoms of intermittently palpitations. Pt took his BP last night and reading was 171/113. Pain worsened today so pt GF brought him to ED. In ED, ECG-NSR HR @ 68 with T-wave abnormalities in inferior lateral leads, INR-4.74 , Trop#1-negative. Upon assessment pt still c/o of chest pain. Pt bedside RN stated that pt had two runs of NSVT one 12 beats one 14 beats. Pt given nitro paste as ordered. Physical exam otherwise benign. Pt admits to stopping Plavix since June b/c he was told to.     Chest Pain suspect unstable angina, CAD/stents with instents restenosis  -due to instent restenosis of mid-distal LAD stent, will increase Imdur to 120mg and add sublingual nitrate as needed.   -if recurrent angina can consider future elective balloon angioplasty vs. brachytherapy given small caliber stent not able to place additional layer of stent.   -will need to continue DAPT with ASA/clopidogrel indefinitely given instent restenosis.   -LDL at goal <70 on atorvastatin 80mg    HFrEF/ICM- EF 40% in 2019, repeat TTE reviewed by myself again 40-45%  -compensated and euvolemic not requiring maintenance diuretic  -continue GDMT with carvedilol, lisinopril, added spironolactone 25mg daily  -AICD interrogated yesterday without event    History of LV apical thrombus  -resolved since 11/2019 and also on current repeat TTE,  was told to discontinue warfarin in 2019 but unclear reason still remains on it, no need to continue.   -apical and distal segments akinesis/aneursymal will need repeat TTE in 2-3 months off warfarin to ensure no recurrence of LV apical thrombus.       Stable for discharge home today, patient can establish care with me in the office in 2 weeks.       Mulugeta Rush DO, [facility]  Faculty Non-Invasive Cardiologist  [phone]

## 2020-09-04 NOTE — DISCHARGE NOTE PROVIDER - NSDCMRMEDTOKEN_GEN_ALL_CORE_FT
aspirin 81 mg oral delayed release tablet: 1 tab(s) orally once a day  buy over the counter from pharmacy  atorvastatin 80 mg oral tablet: 1 tab(s) orally once a day (at bedtime)  carvedilol 12.5 mg oral tablet: 1 tab(s) orally 2 times a day  home  clopidogrel 75 mg oral tablet: 1 tab(s) orally once a day  gabapentin 100 mg oral tablet: orally once a day (at bedtime)  isosorbide mononitrate 120 mg oral tablet, extended release: 1 tab(s) orally once a day  lisinopril 20 mg oral tablet: 1 tab(s) orally once a day  reports taking half a pill 2/2 hypotension at home  spironolactone 25 mg oral tablet: 1 tab(s) orally once a day

## 2020-09-04 NOTE — DISCHARGE NOTE PROVIDER - NSDCFUADDINST_GEN_ALL_CORE_FT
Restricted use with no heavy lifting of affected arm for 48 hours.    No submerging the arm in water for 48 hours.  You may start showering today.  Call your doctor for any bleeding, swelling, loss of sensation in the hand or fingers, or fingers turning blue.    If heavy bleeding or large lumps form, hold pressure at the spot and come to the Emergency Room.    patient not a candidate for cardiac rehab secondary to: -  pt with no need for stent

## 2020-09-04 NOTE — PROGRESS NOTE ADULT - SUBJECTIVE AND OBJECTIVE BOX
Department of Cardiology                                                            [facility]/[place] [address] [place]                                                      Telephone: [phone]. Fax:[phone]                                                               Pre Procedure Cardiac Cath NP Note      Narrative:        51 y/o male with medical history of CAD with multiple stents to RCA, circ. and LAD (most recent 11/2019), MI in 9/2017 at [facility] with recanalization of RCA and LAD restenosis with a mid RCA PEPE placement, apical thrombus in 2017 (on coumadin),Chronic systolic congestive heart failure (5/2018 EF 35-40%), hx of syncope and 2:1 HB with bradycardia with left sided dual chamber MDT AICD (DDD ) in May 2018 [facility] (Dr. Crook, [place]), CVA with right sided weakness (approx June 2019, date unsure) who presents to [facility]ED for chest pain. Chest Pain described as pressure 6/10, non radiating no pain modifying factors. Pt also admits to associated symptoms of intermittently palpitations. Last TTE in 11/2019 without obvious LV apical thrombus but unclear remains on it with elevated INR, came in with typical angina x3 days, noted NSVT on telemetry but 1st Troponin and EKG unremarkable, AICD interrogated without recent event. Repeat echo 9/2 with LVEF 55 - 60%, normal global left ventricular systolic function, and multiple left ventricular regional wall motion abnormalities, moderate concentric LVH, mild MR. Denies chest pain, sob, syncope during exam. Given patients symptoms and strong cardiac history he is now scheduled for Left Heart Catheterization with Dr. Escobar.       ASA:   2  Mallampati:  2  Creatinine:  0.86   GFR:  116  Bleeding Risk:  0.9%  Covid  Negative  9/3/20     	  MEDICATIONS:  carvedilol 12.5 milliGRAM(s) Oral every 12 hours  isosorbide   mononitrate ER Tablet (IMDUR) 30 milliGRAM(s) Oral daily  lisinopril 20 milliGRAM(s) Oral daily  nitroglycerin     SubLingual 0.4 milliGRAM(s) SubLingual every 5 minutes PRN  spironolactone 25 milliGRAM(s) Oral daily  atorvastatin 80 milliGRAM(s) Oral at bedtime  aspirin  chewable 81 milliGRAM(s) Oral daily  clopidogrel Tablet 75 milliGRAM(s) Oral daily  influenza   Vaccine 0.5 milliLiter(s) IntraMuscular once    LABS:	 	    CARDIAC MARKERS:  Trop  <0.01 x3                        16.0   6.47  )-----------( 189      ( 02 Sep 2020 17:54 )             49.0     09-03    144  |  102  |  8.0  ----------------------------<  88  4.3   |  29.0  |  0.86    Ca    9.5      03 Sep 2020 09:40  Mg     2.1     09-02    TPro  7.4  /  Alb  4.6  /  TBili  0.4  /  DBili  x   /  AST  23  /  ALT  23  /  AlkPhos  82  09-02    proBNP:   Lipid Profile:   HgA1c:   TSH:     PHYSICAL EXAM:    T(C): 36.9 (09-04-20 @ 06:18), Max: 36.9 (09-04-20 @ 05:00)  HR: 60 (09-04-20 @ 06:18) (60 - 69)  BP: 171/97 (09-04-20 @ 06:18) (113/74 - 171/97)  RR: 18 (09-04-20 @ 06:18) (18 - 18)  SpO2: 98% (09-04-20 @ 06:18) (94% - 98%)  Wt(kg): --    I&O's Summary      Constitutional: A & O x 3  HEENT:   Normal oral mucosa, PERRL, EOMI	  Cardiovascular: Normal S1 S2, No JVD, No murmurs, No edema  Respiratory: Lungs clear to auscultation	  Gastrointestinal:  Soft, Non-tender, + BS	  Skin: No rashes, No ecchymoses, No cyanosis  Neurologic: Non-focal  Extremities: Normal range of motion, No clubbing, cyanosis or edema  Vascular: Peripheral pulses palpable 2+ bilaterally    TELEMETRY: 	    ECG:    < from: 12 Lead ECG (09.03.20 @ 07:57) >  Sinus bradycardia with 1st degree A-V block  Inferior infarct , age undetermined  Anterolateral infarct , age undetermined  Abnormal ECG    Confirmed by GWEN GRAHAM ([id number]) on 9/3/2020 8:43:29 AM    < end of copied text >  	  RADIOLOGY:   DIAGNOSTIC TESTING:  [ X] Echocardiogram:  < from: TTE Echo Complete w/ Contrast w/ Doppler (09.02.20 @ 18:42) >  Summary:   1. Endocardial visualization was enhanced with intravenous echo contrast.   2. Left ventricular ejection fraction, by visual estimation, is 55 to 60%.   3. Normal global left ventricular systolic function.   4. Multiple left ventricular regional wall motion abnormalities exist. See wall motion findings.   5. Normal left ventricular internal cavity size.   6. There is moderate concentric left ventricular hypertrophy.   7. Normal left atrial size.   8. Normal right atrial size.   9. Mild thickening of the anterior and posterior mitral valve leaflets.  10. Trace mitral valve regurgitation.  11. Sclerotic aortic valve with normal opening.  12. There is no evidence of pericardial effusion.    MD Adrienne Electronically signed on 9/3/2020 at 9:40:24 AM    *** Final ***    MILLA HASTINGS     < end of copied text >      [X ]  Catheterization:    < from: Cardiac Cath Lab - Adult (11.19.19 @ 15:42) >  PROCEDURE:  --  Sonosite - Interventional.  --  Intervention on mid circumflex: drug-eluting stent.  TECHNIQUE: The risks and alternatives of the procedures and conscious  sedation were explained to the patient and informed consent was obtained.  Cardiac catheterization performed urgently.  Local anesthetic given. Right femoral artery access. RADIATION EXPOSURE:  2.8 min. A successful drug-eluting stent was performed on the 70 % lesion  in the mid circumflex. Following intervention there was a 1 % residual  stenosis. According to the ACC/AHA classification system, this lesion was  a type C lesion. There was LOLIS 3 flow before the procedure and LOLIS 3  flow after the procedure. Vessel setup was performed. A 6FR JL3.5 LAUNCHER  guiding catheter was used to intubate the vessel. Vessel setup was  performed. A BMW UNIVERSAL 190CM wire was used to cross the lesion. A 4.00  X 15 KAUSHAL drug-eluting stent was placed across the lesionand deployed at  a maximum inflation pressure of 14 jackelin. Sonosite - Interventional.  CONTRAST GIVEN: Omnipaque 39 ml.  MEDICATIONS GIVEN: Midazolam, 1 mg, IV. Fentanyl, 25 mcg, IV. Heparin, 9000  units, IV. Aspirin, 81 mg, PO. Clopidogrel (Plavix), 600 mg, PO.  CORONARY VESSELS:  CX:   --  Mid circumflex: There was a 70 % stenosis.  COMPLICATIONS: There were no complications.  DIAGNOSTIC RECOMMENDATIONS: ASA and Plavix for 1 year.  INTERVENTIONAL RECOMMENDATIONS: ASA and Plavix for 1 year.  Prepared and signed by  Kenan Kulkarni M.D.  Signed 11/20/2019 12:46:53    < end of copied text >      ASSESSMENT:    51 y/o male with medical history of CAD with multiple stents to RCA, circ. and LAD (most recent 11/2019), MI in 9/2017 at [facility] with recanalization of RCA and LAD restenosis with a mid RCA PEPE placement, apical thrombus in 2017 (on coumadin),Chronic systolic congestive heart failure (5/2018 EF 35-40%), hx of syncope and 2:1 HB with bradycardia with left sided dual chamber MDT AICD (DDD ) in May [facility] CVA with right sided weakness (approx June 2019, date unsure) who presents to [facility]ED for chest pain. Chest Pain described as pressure 6/10, non raidiating, no pain modifying factors. Pt also admits to associated symptoms of intermittently palpitations. Last TTE in 11/2019 without obvious LV apical thrombus but unclear remains on it with elevated INR, came in with typical angina x3 days, noted NSVT on telemetry but 1st Troponin and EKG unremarkable, AICD interrogated without recent event. Repeat echo 9/2 with LVEF 55 - 60%, normal global left ventricular systolic function, and multiple left ventricular regional wall motion abnormalities, moderate concentric LVH, mild MR. Denies chest pain, sob, syncope during exam. Given patients symptoms and strong cardiac history he is now scheduled for Left Heart Catheterization with Dr. Escobar.       -consent obtained by attending   -procedure discussed with patient; risks and benefits explained, questions answered  -labs and ECG reviewed  -last 3 trop negative   -has ongoing intermittent chest pain  -pt received aspirin and plavix pre procedure Department of Cardiology                                                            [facility]/[place] [address] [place]                                                      Telephone: [phone]. Fax:[phone]                                                               Pre Procedure Cardiac Cath NP Note      Narrative:        49 y/o male with medical history of CAD with multiple stents to RCA, circ. and LAD (most recent 11/2019), MI in 9/2017 at [facility] with recanalization of RCA and LAD restenosis with a mid RCA PEPE placement, apical thrombus in 2017 (on coumadin),Chronic systolic congestive heart failure (5/2018 EF 35-40%), hx of syncope and 2:1 HB with bradycardia with left sided dual chamber MDT AICD (DDD ) in May 2018 [facility] (Dr. Crook, [place]), CVA with right sided weakness (approx June 2019, date unsure) who presents to [facility]-ED for chest pain. Chest Pain described as pressure 6/10, non radiating no pain modifying factors. Pt also admits to associated symptoms of intermittently palpitations. Last TTE in 11/2019 without obvious LV apical thrombus but unclear remains on it with elevated INR, came in with typical angina x3 days, noted NSVT on telemetry but 1st Troponin and EKG unremarkable, AICD interrogated without recent event. Repeat echo 9/2 with LVEF 55 - 60%, normal global left ventricular systolic function, and multiple left ventricular regional wall motion abnormalities, moderate concentric LVH, mild MR. Denies chest pain, sob, syncope during exam. Given patients symptoms and strong cardiac history he is now scheduled for Left Heart Catheterization with Dr. Escobar.       ASA:   2  Mallampati:  2  Creatinine:  0.86   GFR:  116  Bleeding Risk:  0.9%  Covid  Negative  9/3/20     	  MEDICATIONS:  carvedilol 12.5 milliGRAM(s) Oral every 12 hours  isosorbide   mononitrate ER Tablet (IMDUR) 30 milliGRAM(s) Oral daily  lisinopril 20 milliGRAM(s) Oral daily  nitroglycerin     SubLingual 0.4 milliGRAM(s) SubLingual every 5 minutes PRN  spironolactone 25 milliGRAM(s) Oral daily  atorvastatin 80 milliGRAM(s) Oral at bedtime  aspirin  chewable 81 milliGRAM(s) Oral daily  clopidogrel Tablet 75 milliGRAM(s) Oral daily  influenza   Vaccine 0.5 milliLiter(s) IntraMuscular once    LABS:	 	    CARDIAC MARKERS:  Trop  <0.01 x3                        16.0   6.47  )-----------( 189      ( 02 Sep 2020 17:54 )             49.0     09-03    144  |  102  |  8.0  ----------------------------<  88  4.3   |  29.0  |  0.86    Ca    9.5      03 Sep 2020 09:40  Mg     2.1     09-02    TPro  7.4  /  Alb  4.6  /  TBili  0.4  /  DBili  x   /  AST  23  /  ALT  23  /  AlkPhos  82  09-02    proBNP:   Lipid Profile:   HgA1c:   TSH:     PHYSICAL EXAM:    T(C): 36.9 (09-04-20 @ 06:18), Max: 36.9 (09-04-20 @ 05:00)  HR: 60 (09-04-20 @ 06:18) (60 - 69)  BP: 171/97 (09-04-20 @ 06:18) (113/74 - 171/97)  RR: 18 (09-04-20 @ 06:18) (18 - 18)  SpO2: 98% (09-04-20 @ 06:18) (94% - 98%)  Wt(kg): --    I&O's Summary      Constitutional: A & O x 3  HEENT:   Normal oral mucosa, PERRL, EOMI	  Cardiovascular: Normal S1 S2, No JVD, No murmurs, No edema  Respiratory: Lungs clear to auscultation	  Gastrointestinal:  Soft, Non-tender, + BS	  Skin: No rashes, No ecchymoses, No cyanosis  Neurologic: Non-focal  Extremities: Normal range of motion, No clubbing, cyanosis or edema  Vascular: Peripheral pulses palpable 2+ bilaterally    TELEMETRY: 	    ECG:    < from: 12 Lead ECG (09.03.20 @ 07:57) >  Sinus bradycardia with 1st degree A-V block  Inferior infarct , age undetermined  Anterolateral infarct , age undetermined  Abnormal ECG    Confirmed by GWEN GRAHAM ([id number]) on 9/3/2020 8:43:29 AM    < end of copied text >  	  RADIOLOGY:   DIAGNOSTIC TESTING:  [ X] Echocardiogram:  < from: TTE Echo Complete w/ Contrast w/ Doppler (09.02.20 @ 18:42) >  Summary:   1. Endocardial visualization was enhanced with intravenous echo contrast.   2. Left ventricular ejection fraction, by visual estimation, is 55 to 60%.   3. Normal global left ventricular systolic function.   4. Multiple left ventricular regional wall motion abnormalities exist. See wall motion findings.   5. Normal left ventricular internal cavity size.   6. There is moderate concentric left ventricular hypertrophy.   7. Normal left atrial size.   8. Normal right atrial size.   9. Mild thickening of the anterior and posterior mitral valve leaflets.  10. Trace mitral valve regurgitation.  11. Sclerotic aortic valve with normal opening.  12. There is no evidence of pericardial effusion.    MD Adrienne Electronically signed on 9/3/2020 at 9:40:24 AM    *** Final ***    MILLA HASTINGS     < end of copied text >      [X ]  Catheterization:    < from: Cardiac Cath Lab - Adult (11.19.19 @ 15:42) >  PROCEDURE:  --  Sonosite - Interventional.  --  Intervention on mid circumflex: drug-eluting stent.  TECHNIQUE: The risks and alternatives of the procedures and conscious  sedation were explained to the patient and informed consent was obtained.  Cardiac catheterization performed urgently.  Local anesthetic given. Right femoral artery access. RADIATION EXPOSURE:  2.8 min. A successful drug-eluting stent was performed on the 70 % lesion  in the mid circumflex. Following intervention there was a 1 % residual  stenosis. According to the ACC/AHA classification system, this lesion was  a type C lesion. There was LOLIS 3 flow before the procedure and LOLIS 3  flow after the procedure. Vessel setup was performed. A 6FR JL3.5 LAUNCHER  guiding catheter was used to intubate the vessel. Vessel setup was  performed. A BMW UNIVERSAL 190CM wire was used to cross the lesion. A 4.00  X 15 KAUSHAL drug-eluting stent was placed across the lesionand deployed at  a maximum inflation pressure of 14 jackelin. Sonosite - Interventional.  CONTRAST GIVEN: Omnipaque 39 ml.  MEDICATIONS GIVEN: Midazolam, 1 mg, IV. Fentanyl, 25 mcg, IV. Heparin, 9000  units, IV. Aspirin, 81 mg, PO. Clopidogrel (Plavix), 600 mg, PO.  CORONARY VESSELS:  CX:   --  Mid circumflex: There was a 70 % stenosis.  COMPLICATIONS: There were no complications.  DIAGNOSTIC RECOMMENDATIONS: ASA and Plavix for 1 year.  INTERVENTIONAL RECOMMENDATIONS: ASA and Plavix for 1 year.  Prepared and signed by  Kenan Kulkarni M.D.  Signed 11/20/2019 12:46:53    < end of copied text >      ASSESSMENT:    49 y/o male with medical history of CAD with multiple stents to RCA, circ. and LAD (most recent 11/2019), MI in 9/2017 at [facility] with recanalization of RCA and LAD restenosis with a mid RCA PEPE placement, apical thrombus in 2017 (on coumadin),Chronic systolic congestive heart failure (5/2018 EF 35-40%), hx of syncope and 2:1 HB with bradycardia with left sided dual chamber MDT AICD (DDD ) [facility] CVA with right sided weakness (approx June 2019, date unsure) who presents to [facility]-ED for chest pain. Chest Pain described as pressure 6/10, non raidiating, no pain modifying factors. Pt also admits to associated symptoms of intermittently palpitations. Last TTE in 11/2019 without obvious LV apical thrombus but unclear remains on it with elevated INR, came in with typical angina x3 days, noted NSVT on telemetry but 1st Troponin and EKG unremarkable, AICD interrogated without recent event. Repeat echo 9/2 with LVEF 55 - 60%, normal global left ventricular systolic function, and multiple left ventricular regional wall motion abnormalities, moderate concentric LVH, mild MR. Denies chest pain, sob, syncope during exam. Given patients symptoms and strong cardiac history he is now scheduled for Left Heart Catheterization with Dr. Escobar.     -has ongoing intermittent chest pain  -consent obtained by attending   -procedure discussed with patient; risks and benefits explained, questions answered  -labs and ECG reviewed  -last 3 trop negative   -pt's INR 2.26 (4.74 on admit)  -pt received aspirin and plavix pre procedure   -plan for [facility] today

## 2020-09-08 PROBLEM — Z86.73 PERSONAL HISTORY OF TRANSIENT ISCHEMIC ATTACK (TIA), AND CEREBRAL INFARCTION WITHOUT RESIDUAL DEFICITS: Chronic | Status: ACTIVE | Noted: 2020-09-02

## 2020-09-14 DIAGNOSIS — E78.5 HYPERLIPIDEMIA, UNSPECIFIED: ICD-10-CM

## 2020-09-15 ENCOUNTER — APPOINTMENT (OUTPATIENT)
Dept: CARDIOLOGY | Facility: CLINIC | Age: 50
End: 2020-09-15
Payer: MEDICAID

## 2020-09-15 ENCOUNTER — NON-APPOINTMENT (OUTPATIENT)
Age: 50
End: 2020-09-15

## 2020-09-15 VITALS — DIASTOLIC BLOOD PRESSURE: 67 MMHG | SYSTOLIC BLOOD PRESSURE: 117 MMHG

## 2020-09-15 VITALS
HEIGHT: 68.5 IN | SYSTOLIC BLOOD PRESSURE: 111 MMHG | OXYGEN SATURATION: 97 % | BODY MASS INDEX: 28.17 KG/M2 | TEMPERATURE: 98.9 F | WEIGHT: 188 LBS | HEART RATE: 64 BPM | DIASTOLIC BLOOD PRESSURE: 69 MMHG

## 2020-09-15 DIAGNOSIS — I21.9 ACUTE MYOCARDIAL INFARCTION, UNSPECIFIED: ICD-10-CM

## 2020-09-15 PROCEDURE — 99214 OFFICE O/P EST MOD 30 MIN: CPT

## 2020-09-15 PROCEDURE — 93000 ELECTROCARDIOGRAM COMPLETE: CPT

## 2020-09-15 RX ORDER — WARFARIN 4 MG/1
4 TABLET ORAL
Refills: 0 | Status: DISCONTINUED | COMMUNITY
Start: 2020-09-14 | End: 2020-09-15

## 2020-09-15 NOTE — HISTORY OF PRESENT ILLNESS
[FreeTextEntry1] : 50M Creole-speaking man with history of CAD with multivessels stentings and MI (2017 at [facility] with restenosis of RCA and LAD s/p PEPE to mRCA), LV apical thrombus (been on warfarin since ), HFrEF (35-40% in 2018), syncope with 2:1 heart block s/p [employer] AICD 2018 (DDD ), CVA (2019) and with unstable angina in 2019 s/p PCI/stent to mLCx at [facility], previously follows at [facility] with Dr. Crook but since left practice without follow up, presented to [facility]ED on 20 with unstable angina, serial Troponin negative, repeaT TTE with EF 40-45% (upon my own review) and resolved LV apical thrombus (known since 2019 but unclear reason remains on warfarin and self discontinued clopidogrel 75mg since 2019), underwent LHC found with patent stents in LCx, RCA and moderate-severe instent restenosis of [id number] LAD, given small caliber size of the vessel decided medical management with antianginal (increased Imdur to 120mg) for now, presents for initial outpatient cardiology evaluation.  \par \par Patient reports SBP 80-90s with dizziness at times when he uses Imdur 120mg, continues to have L-sided chest pain but mainly at night and in the morning, at times also with ambulation, typically last for about 30 minutes. He remains compliant with ASA/clopidogrel, off warfarin. He is planning ot return to [facility] in about 1-2 month as his visa is about to be . \par \par \par Lipid panel:\par TG 58, , HDL 39, LDL 54

## 2020-09-15 NOTE — REVIEW OF SYSTEMS
[Fever] : no fever [Blurry Vision] : no blurred vision [Chills] : no chills [Earache] : no earache [Shortness Of Breath] : no shortness of breath [Dyspnea on exertion] : not dyspnea during exertion [Chest Pain] : chest pain [Cough] : no cough [Abdominal Pain] : no abdominal pain [Joint Pain] : no joint pain [Urinary Frequency] : no change in urinary frequency [Skin: A Rash] : no rash: [Dizziness] : dizziness [Confusion] : no confusion was observed [Easy Bleeding] : no tendency for easy bleeding [Excessive Thirst] : no polydipsia

## 2020-09-15 NOTE — PHYSICAL EXAM
[General Appearance - Well Developed] : well developed [General Appearance - Well Nourished] : well nourished [Normal Conjunctiva] : the conjunctiva exhibited no abnormalities [FreeTextEntry1] : No carotid bruit [Heart Rate And Rhythm] : heart rate and rhythm were normal [Murmurs] : no murmurs present [Heart Sounds] : normal S1 and S2 [Respiration, Rhythm And Depth] : normal respiratory rhythm and effort [Edema] : no peripheral edema present [] : no respiratory distress [Bowel Sounds] : normal bowel sounds [Abdomen Soft] : soft [Abnormal Walk] : normal gait [Nail Clubbing] : no clubbing of the fingernails [Cyanosis, Localized] : no localized cyanosis [Skin Color & Pigmentation] : normal skin color and pigmentation [Oriented To Time, Place, And Person] : oriented to person, place, and time [Affect] : the affect was normal

## 2020-09-15 NOTE — DISCUSSION/SUMMARY
[FreeTextEntry1] : 50M Creole-speaking man with history of CAD with multivessels stentings and MI (2017 at [facility] with restenosis of RCA and LAD s/p PEPE to mRCA), LV apical thrombus (been on warfarin since 2017), HFrEF (35-40% in 2018), syncope with 2:1 heart block s/p Medtronic AICD 5/2018 (DDD ), CVA (6/2019) and with unstable angina in 11/2019 s/p PCI/stent to mLCx at [facility], previously follows at [facility] with Dr. Crook but since left practice without follow up, presented to [facility]ED on 9/2/20 with unstable angina, serial Troponin negative, repeaT TTE with EF 40-45% (upon my own review) and resolved LV apical thrombus (known since 11/2019 but unclear reason remains on warfarin and self discontinued clopidogrel 75mg since 7/2019), underwent LHC found with patent stents in LCx, RCA and moderate-severe instent restenosis of ioq-ah-cooavx LAD, given small caliber size of the vessel decided medical management with antianginal (increased Imdur to 120mg) for now, presents for initial outpatient cardiology evaluation.  \par \par Recurrent angina not tolerating high dose Imdur use, will reduce to 60mg and add sublingual nitrate. Also will get prior approval for Ranexa if would cover. If still with refractory angina would need laser arterectomy. \par \par 1. CAD with multiple stents and instent-restenosis- continue DAPT indefinitely- continue carvedilol and Imdur 60mg, sublingual nitrate, Ranexa if approved; if refractory to antianginal then for laser arterectomy. Continue rosuvastatin. \par \par 2. Ischemic cardiomyopathy, HFrEf- ACC/AHA stage B- continue carvedilol and lisinopril. \par \par 3. History of LV apical thrombus- resolved and off warfarin- will repeat TTE in 2-4 weeks to reassess as now off anticoagulant.  \par \par 4. Histor of 2:1 heart block- AICD interrogated during recent hospitalization. \par \par Will need to plan for his procedure if returning to [facility] in 1-2 months. \par \par \par Follow up in 2 weeks to reassess anginal symptoms. \par

## 2020-09-19 DIAGNOSIS — Z01.818 ENCOUNTER FOR OTHER PREPROCEDURAL EXAMINATION: ICD-10-CM

## 2020-09-20 ENCOUNTER — APPOINTMENT (OUTPATIENT)
Dept: DISASTER EMERGENCY | Facility: CLINIC | Age: 50
End: 2020-09-20

## 2020-09-21 LAB — SARS-COV-2 N GENE NPH QL NAA+PROBE: NOT DETECTED

## 2020-09-23 ENCOUNTER — TRANSCRIPTION ENCOUNTER (OUTPATIENT)
Age: 50
End: 2020-09-23

## 2020-09-23 ENCOUNTER — INPATIENT (INPATIENT)
Facility: HOSPITAL | Age: 50
LOS: 0 days | Discharge: HOME | DRG: 247 | End: 2020-09-24
Attending: INTERNAL MEDICINE | Admitting: INTERNAL MEDICINE
Payer: MEDICAID

## 2020-09-23 VITALS
RESPIRATION RATE: 18 BRPM | HEIGHT: 70 IN | OXYGEN SATURATION: 98 % | TEMPERATURE: 99 F | SYSTOLIC BLOOD PRESSURE: 144 MMHG | HEART RATE: 78 BPM | DIASTOLIC BLOOD PRESSURE: 93 MMHG | WEIGHT: 175.05 LBS

## 2020-09-23 DIAGNOSIS — I25.10 ATHEROSCLEROTIC HEART DISEASE OF NATIVE CORONARY ARTERY WITHOUT ANGINA PECTORIS: ICD-10-CM

## 2020-09-23 DIAGNOSIS — Z95.810 PRESENCE OF AUTOMATIC (IMPLANTABLE) CARDIAC DEFIBRILLATOR: Chronic | ICD-10-CM

## 2020-09-23 LAB
ABO RH CONFIRMATION: SIGNIFICANT CHANGE UP
ALBUMIN SERPL ELPH-MCNC: 4.2 G/DL — SIGNIFICANT CHANGE UP (ref 3.3–5.2)
ALP SERPL-CCNC: 78 U/L — SIGNIFICANT CHANGE UP (ref 40–120)
ALT FLD-CCNC: 22 U/L — SIGNIFICANT CHANGE UP
ANION GAP SERPL CALC-SCNC: 13 MMOL/L — SIGNIFICANT CHANGE UP (ref 5–17)
AST SERPL-CCNC: 24 U/L — SIGNIFICANT CHANGE UP
BASOPHILS # BLD AUTO: 0.02 K/UL — SIGNIFICANT CHANGE UP (ref 0–0.2)
BASOPHILS NFR BLD AUTO: 0.4 % — SIGNIFICANT CHANGE UP (ref 0–2)
BILIRUB SERPL-MCNC: 0.8 MG/DL — SIGNIFICANT CHANGE UP (ref 0.4–2)
BLD GP AB SCN SERPL QL: SIGNIFICANT CHANGE UP
BUN SERPL-MCNC: 9 MG/DL — SIGNIFICANT CHANGE UP (ref 8–20)
CALCIUM SERPL-MCNC: 9.5 MG/DL — SIGNIFICANT CHANGE UP (ref 8.6–10.2)
CHLORIDE SERPL-SCNC: 101 MMOL/L — SIGNIFICANT CHANGE UP (ref 98–107)
CO2 SERPL-SCNC: 26 MMOL/L — SIGNIFICANT CHANGE UP (ref 22–29)
CREAT SERPL-MCNC: 0.95 MG/DL — SIGNIFICANT CHANGE UP (ref 0.5–1.3)
EOSINOPHIL # BLD AUTO: 0.09 K/UL — SIGNIFICANT CHANGE UP (ref 0–0.5)
EOSINOPHIL NFR BLD AUTO: 1.9 % — SIGNIFICANT CHANGE UP (ref 0–6)
GLUCOSE SERPL-MCNC: 83 MG/DL — SIGNIFICANT CHANGE UP (ref 70–99)
HCT VFR BLD CALC: 48.2 % — SIGNIFICANT CHANGE UP (ref 39–50)
HGB BLD-MCNC: 16 G/DL — SIGNIFICANT CHANGE UP (ref 13–17)
IMM GRANULOCYTES NFR BLD AUTO: 0.6 % — SIGNIFICANT CHANGE UP (ref 0–1.5)
INR BLD: 1.19 RATIO — HIGH (ref 0.88–1.16)
LYMPHOCYTES # BLD AUTO: 1.64 K/UL — SIGNIFICANT CHANGE UP (ref 1–3.3)
LYMPHOCYTES # BLD AUTO: 34.5 % — SIGNIFICANT CHANGE UP (ref 13–44)
MCHC RBC-ENTMCNC: 29.6 PG — SIGNIFICANT CHANGE UP (ref 27–34)
MCHC RBC-ENTMCNC: 33.2 GM/DL — SIGNIFICANT CHANGE UP (ref 32–36)
MCV RBC AUTO: 89.3 FL — SIGNIFICANT CHANGE UP (ref 80–100)
MONOCYTES # BLD AUTO: 0.52 K/UL — SIGNIFICANT CHANGE UP (ref 0–0.9)
MONOCYTES NFR BLD AUTO: 10.9 % — SIGNIFICANT CHANGE UP (ref 2–14)
NEUTROPHILS # BLD AUTO: 2.45 K/UL — SIGNIFICANT CHANGE UP (ref 1.8–7.4)
NEUTROPHILS NFR BLD AUTO: 51.7 % — SIGNIFICANT CHANGE UP (ref 43–77)
NT-PROBNP SERPL-SCNC: 76 PG/ML — SIGNIFICANT CHANGE UP (ref 0–300)
PLATELET # BLD AUTO: 159 K/UL — SIGNIFICANT CHANGE UP (ref 150–400)
POTASSIUM SERPL-MCNC: 4.1 MMOL/L — SIGNIFICANT CHANGE UP (ref 3.5–5.3)
POTASSIUM SERPL-SCNC: 4.1 MMOL/L — SIGNIFICANT CHANGE UP (ref 3.5–5.3)
PROT SERPL-MCNC: 7 G/DL — SIGNIFICANT CHANGE UP (ref 6.6–8.7)
PROTHROM AB SERPL-ACNC: 13.7 SEC — HIGH (ref 10.6–13.6)
RBC # BLD: 5.4 M/UL — SIGNIFICANT CHANGE UP (ref 4.2–5.8)
RBC # FLD: 12.1 % — SIGNIFICANT CHANGE UP (ref 10.3–14.5)
SARS-COV-2 RNA SPEC QL NAA+PROBE: SIGNIFICANT CHANGE UP
SODIUM SERPL-SCNC: 140 MMOL/L — SIGNIFICANT CHANGE UP (ref 135–145)
TROPONIN T SERPL-MCNC: <0.01 NG/ML — SIGNIFICANT CHANGE UP (ref 0–0.06)
WBC # BLD: 4.75 K/UL — SIGNIFICANT CHANGE UP (ref 3.8–10.5)
WBC # FLD AUTO: 4.75 K/UL — SIGNIFICANT CHANGE UP (ref 3.8–10.5)

## 2020-09-23 PROCEDURE — 93010 ELECTROCARDIOGRAM REPORT: CPT

## 2020-09-23 PROCEDURE — 71045 X-RAY EXAM CHEST 1 VIEW: CPT | Mod: 26

## 2020-09-23 PROCEDURE — 99253 IP/OBS CNSLTJ NEW/EST LOW 45: CPT

## 2020-09-23 PROCEDURE — 99285 EMERGENCY DEPT VISIT HI MDM: CPT

## 2020-09-23 PROCEDURE — 99223 1ST HOSP IP/OBS HIGH 75: CPT | Mod: AI

## 2020-09-23 RX ORDER — RANOLAZINE 500 MG/1
500 TABLET, FILM COATED, EXTENDED RELEASE ORAL
Refills: 0 | Status: DISCONTINUED | OUTPATIENT
Start: 2020-09-23 | End: 2020-09-24

## 2020-09-23 RX ORDER — CARVEDILOL PHOSPHATE 80 MG/1
12.5 CAPSULE, EXTENDED RELEASE ORAL
Refills: 0 | Status: DISCONTINUED | OUTPATIENT
Start: 2020-09-23 | End: 2020-09-23

## 2020-09-23 RX ORDER — ASPIRIN/CALCIUM CARB/MAGNESIUM 324 MG
243 TABLET ORAL ONCE
Refills: 0 | Status: COMPLETED | OUTPATIENT
Start: 2020-09-23 | End: 2020-09-23

## 2020-09-23 RX ORDER — ATORVASTATIN CALCIUM 80 MG/1
80 TABLET, FILM COATED ORAL AT BEDTIME
Refills: 0 | Status: DISCONTINUED | OUTPATIENT
Start: 2020-09-23 | End: 2020-09-24

## 2020-09-23 RX ORDER — ASPIRIN/CALCIUM CARB/MAGNESIUM 324 MG
81 TABLET ORAL DAILY
Refills: 0 | Status: DISCONTINUED | OUTPATIENT
Start: 2020-09-23 | End: 2020-09-24

## 2020-09-23 RX ORDER — LISINOPRIL 2.5 MG/1
20 TABLET ORAL DAILY
Refills: 0 | Status: DISCONTINUED | OUTPATIENT
Start: 2020-09-23 | End: 2020-09-24

## 2020-09-23 RX ORDER — SPIRONOLACTONE 25 MG/1
25 TABLET, FILM COATED ORAL DAILY
Refills: 0 | Status: DISCONTINUED | OUTPATIENT
Start: 2020-09-23 | End: 2020-09-24

## 2020-09-23 RX ORDER — CARVEDILOL PHOSPHATE 80 MG/1
12.5 CAPSULE, EXTENDED RELEASE ORAL
Refills: 0 | Status: DISCONTINUED | OUTPATIENT
Start: 2020-09-23 | End: 2020-09-24

## 2020-09-23 RX ORDER — CLOPIDOGREL BISULFATE 75 MG/1
75 TABLET, FILM COATED ORAL DAILY
Refills: 0 | Status: DISCONTINUED | OUTPATIENT
Start: 2020-09-24 | End: 2020-09-24

## 2020-09-23 RX ORDER — NITROGLYCERIN 6.5 MG
0.4 CAPSULE, EXTENDED RELEASE ORAL
Refills: 0 | Status: DISCONTINUED | OUTPATIENT
Start: 2020-09-23 | End: 2020-09-24

## 2020-09-23 RX ORDER — NITROGLYCERIN 6.5 MG
0.4 CAPSULE, EXTENDED RELEASE ORAL ONCE
Refills: 0 | Status: COMPLETED | OUTPATIENT
Start: 2020-09-23 | End: 2020-09-23

## 2020-09-23 RX ORDER — MORPHINE SULFATE 50 MG/1
2 CAPSULE, EXTENDED RELEASE ORAL ONCE
Refills: 0 | Status: DISCONTINUED | OUTPATIENT
Start: 2020-09-23 | End: 2020-09-24

## 2020-09-23 RX ADMIN — Medication 0.4 MILLIGRAM(S): at 11:00

## 2020-09-23 RX ADMIN — CARVEDILOL PHOSPHATE 12.5 MILLIGRAM(S): 80 CAPSULE, EXTENDED RELEASE ORAL at 18:54

## 2020-09-23 RX ADMIN — RANOLAZINE 500 MILLIGRAM(S): 500 TABLET, FILM COATED, EXTENDED RELEASE ORAL at 18:54

## 2020-09-23 RX ADMIN — Medication 0.4 MILLIGRAM(S): at 15:02

## 2020-09-23 RX ADMIN — ATORVASTATIN CALCIUM 80 MILLIGRAM(S): 80 TABLET, FILM COATED ORAL at 21:42

## 2020-09-23 RX ADMIN — Medication 243 MILLIGRAM(S): at 10:59

## 2020-09-23 NOTE — H&P ADULT - NSHPLANGTRANSLATORFT_GEN_A_CORE
Pt understands english and limited speaking, Girlfriend at bedside who translated on his behalf. Declined translation services

## 2020-09-23 NOTE — H&P ADULT - NSHPLABSRESULTS_GEN_ALL_CORE
16.0   4.75  )-----------( 159      ( 23 Sep 2020 10:13 )             48.2   09-23    140  |  101  |  9.0  ----------------------------<  83  4.1   |  26.0  |  0.95    Ca    9.5      23 Sep 2020 10:13    TPro  7.0  /  Alb  4.2  /  TBili  0.8  /  DBili  x   /  AST  24  /  ALT  22  /  AlkPhos  78  09-23  Troponin T, Serum (09.23.20 @ 10:13)    Troponin T, Serum: <0.01: Reference Interval for Troponin T  Less than 0.06 ng/mL - includes the 99th percentile of a healthy  population at a method C.V. of 10% or less.  NOTE: Troponin T is measured by the Roche CLIA method and these  results are not interchangable with Troponin I results since they are  different assays with different reference intervals. ng/mL    < from: Xray Chest 1 View-PORTABLE IMMEDIATE (09.23.20 @ 10:15) >    IMPRESSION:    Clear lungs.

## 2020-09-23 NOTE — H&P ADULT - NSICDXPASTSURGICALHX_GEN_ALL_CORE_FT
PAST SURGICAL HISTORY:  Cardiac defibrillator in place 2017  Medtronic Visa AF MRI VR Surescan ICD   Model# [id number], SN# [id number]

## 2020-09-23 NOTE — ED ADULT TRIAGE NOTE - CHIEF COMPLAINT QUOTE
pt ambulatory into ED with own personal cane c/o chest pain since yesterday with associated difficulty breathing. reports cardiac hx. was recently d/gina from [facility] for same complaint.

## 2020-09-23 NOTE — ED PROVIDER NOTE - CLINICAL SUMMARY MEDICAL DECISION MAKING FREE TEXT BOX
Check labs, EKG, consult cardiology. Discussed with [facility], they knew that patient was coming, plan for laser therapy today. Will order labs, ECG, CXR and admit to medicine on tele.

## 2020-09-23 NOTE — DISCHARGE NOTE PROVIDER - NSDCFUADDINST_GEN_ALL_CORE_FT
Choose lean meats and poultry without skin and prepare them without added saturated and trans fat.  Eat fish at least twice a week. Recent research shows that eating oily fish containing omega-3 fatty acids (for example, salmon, trout and herring) may help lower your risk of death from coronary artery disease.  Select fat-free, 1 percent fat and low-fat dairy products.  Cut back on foods containing partially hydrogenated vegetable oils to reduce trans fat in your diet.   To lower cholesterol, reduce saturated fat to no more than 5 to 6 percent of total calories. For someone eating 2,000 calories a day, that’s about 13 grams of saturated fat.  Cut back on beverages and foods with added sugars.  Choose and prepare foods with little or no salt. To lower blood pressure, aim to eat no more than 2,400 milligrams of sodium per day. Reducing daily intake to 1,500 mg is desirable because it can lower blood pressure even further.  If you drink alcohol, drink in moderation. That means one drink per day if you’re a woman and two drinks  per day if you’re a man.  Follow the American Heart Association recommendations when you eat out, and keep an eye on your portion sizes.

## 2020-09-23 NOTE — ED ADULT NURSE NOTE - NSIMPLEMENTINTERV_GEN_ALL_ED
Implemented All Universal Safety Interventions:  [place] to call system. Call bell, personal items and telephone within reach. Instruct patient to call for assistance. Room bathroom lighting operational. Non-slip footwear when patient is off stretcher. Physically safe environment: no spills, clutter or unnecessary equipment. Stretcher in lowest position, wheels locked, appropriate side rails in place.

## 2020-09-23 NOTE — ED ADULT NURSE NOTE - PSH
Cardiac defibrillator in place  2017  Medtronic Visa AF MRI VR Surescan ICD   Model# [id number], SN# [id number]

## 2020-09-23 NOTE — ED ADULT NURSE NOTE - CHIEF COMPLAINT QUOTE
pt ambulatory into ED with own personal cane c/o chest pain since yesterday with associated difficulty breathing. reports cardiac hx. was recently d/gina from [facility] for same complaint.

## 2020-09-23 NOTE — DISCHARGE NOTE PROVIDER - NSDCCPCAREPLAN_GEN_ALL_CORE_FT
PRINCIPAL DISCHARGE DIAGNOSIS  Diagnosis: CAD (coronary artery disease)  Assessment and Plan of Treatment: Optimize cardiac function       PRINCIPAL DISCHARGE DIAGNOSIS  Diagnosis: Unstable angina  Assessment and Plan of Treatment: s/p cath and now has 2 PEPE in the LAD. Patient will follow up with Dr. Rush in the office within 2 weeks. continue aspirin and plavix. continue statin.      SECONDARY DISCHARGE DIAGNOSES  Diagnosis: Ventricular tachycardia  Assessment and Plan of Treatment: patient has ICD. For now will increase carvedilol. Follow up with Dr. Rush as above.

## 2020-09-23 NOTE — ED ADULT NURSE NOTE - OBJECTIVE STATEMENT
49 y/o m 49 y/o a&ox3 m comes to ED c/o rt. sided chest pain radiating to his left shoulder and arm starting yesterday. pt. c/o sob at times at rest. pt. denies n/v/d/fevers. pt. hx of defibrillator and x5 stents. pt. in no apparent distress.

## 2020-09-23 NOTE — H&P ADULT - NSICDXPASTMEDICALHX_GEN_ALL_CORE_FT
PAST MEDICAL HISTORY:  CAD (coronary artery disease)     Former smoker     Heart block 2:1 HB   dual chamber AICD (DDD ) in May 2018 [facility] (Dr. Crook David)    HTN (hypertension)     MI (myocardial infarction) 2 coronary stents    Status post CVA residual rt. lower ext weakness.    Stented coronary artery     Thrombosis apical

## 2020-09-23 NOTE — CONSULT NOTE ADULT - ASSESSMENT
A/P:  50M Creole-speaking man with history of CAD with multivessels stentings and MI (2017 at [facility] with restenosis of RCA and LAD s/p PEPE to mRCA), LV apical thrombus (been on warfarin since 2017), HFrEF (35-40% in 2018), syncope with 2:1 heart block s/p Medtronic AICD 5/2018 (DDD ), CVA (6/2019) and with unstable angina in 11/2019 s/p PCI/stent to mLCx at [facility], previously follows at [facility] with Dr. Crook but since left practice without follow up, presented to [facility]ED on 9/2/20 with unstable angina, serial Troponin negative, repeaT TTE with EF 40-45% (upon my own review) and resolved LV apical thrombus (known since 11/2019 but unclear reason remains on warfarin and self discontinued clopidogrel 75mg since 7/2019), underwent LHC found with patent stents in LCx, RCA and moderate-severe instent restenosis of bmy-ov-hhsniy LAD, given small caliber size of the vessel decided medical management with antianginal (increased Imdur to 120mg). Patient c/t c/o anginal symptoms in the morning, at night, and at times w/ambulation, as well as some dizziness. Currently asymptomatic. Denies palpitations, irregular and/or rapid heart beat, SOB, NORIEGA, syncope/near syncope, orthopnea, PND, cough, edema, f/c, n/v/d, hematuria, or hematochezia.     Recurrent Angina, known CAD  -COVID swab for Laser arthrectomy to dLAD today  -Troponin  -CBC, CMP, Coag panel  -EKG  -tele monitor  -c/w DAPT  -c/w coreg 12.5mg PO BID  -c/w Crestor 40mg PO qHS  -c/w Lisinopril 20mg PO QD  -NPO  -post cath TTE (limited) eval LV thrombus d/t hold of coumadin    HFrecEF, EF now 55-60% (9/2/20)  -GDMT as above  -currently euvolemic on exam    Plan discussed w/Dr. Rush and Dr. Pearson A/P:  50M Creole-speaking man with history of CAD with multivessels stentings and MI (2017 at [facility] with restenosis of RCA and LAD s/p PEPE to mRCA), LV apical thrombus (been on warfarin since 2017), HFrEF (35-40% in 2018), syncope with 2:1 heart block s/p Medtronic AICD 5/2018 (DDD ), CVA (6/2019) and with unstable angina in 11/2019 s/p PCI/stent to mLCx at [facility], previously follows at [facility] with Dr. Crook but since left practice without follow up, presented to [facility]ED on 9/2/20 with unstable angina, serial Troponin negative, repeaT TTE with EF 40-45% (upon my own review) and resolved LV apical thrombus (known since 11/2019 but unclear reason remains on warfarin and self discontinued clopidogrel 75mg since 7/2019), underwent LHC found with patent stents in LCx, RCA and moderate-severe instent restenosis of ekn-sq-jekkay LAD, given small caliber size of the vessel decided medical management with antianginal (increased Imdur to 120mg). Patient c/t c/o anginal symptoms in the morning, at night, and at times w/ambulation, as well as some dizziness. Currently asymptomatic. Denies palpitations, irregular and/or rapid heart beat, SOB, NORIEGA, syncope/near syncope, orthopnea, PND, cough, edema, f/c, n/v/d, hematuria, or hematochezia.     Recurrent Angina, known CAD  -COVID swab for Laser arthrectomy to dLAD today  -Troponin  -CBC, CMP, Coag panel  -EKG  -tele monitor  -c/w DAPT  -c/w coreg 12.5mg PO BID  -c/w Crestor 40mg PO qHS  -c/w Lisinopril 20mg PO QD  -NPO  -post cath TTE (limited) eval LV thrombus d/t hold of coumadin    HFrecEF, EF now 55-60% (9/2/20)  -GDMT as above  -currently euvolemic on exam    Plan for Laser arthrectomy, dLAD  ASA: 3  Mallampati: Class II  Bleeding risk: 1%        Plan discussed w/Dr. Rush and Dr. Pearson

## 2020-09-23 NOTE — ED PROVIDER NOTE - PHYSICAL EXAMINATION
Gen: Well appearing in NAD  Head: NC/AT  Neck: trachea midline  Cardiac: RRR  Resp: No distress, CTAB  Ext: no deformities  Neuro:  A&O appears non focal  Skin:  Warm and dry as visualized  Psych:  Normal affect and mood Gen: Well appearing in NAD  Head: NC/AT  Neck: trachea midline  Cardiac: RRR  Resp: No distress, CTAB  Ext: no deformities, no calf ttp, no LE edema  Neuro:  A&O appears non focal  Skin:  Warm and dry as visualized  Psych:  Normal affect and mood

## 2020-09-23 NOTE — H&P ADULT - NSHPPHYSICALEXAM_GEN_ALL_CORE
T(C): 37 (09-23-20 @ 09:02), Max: 37 (09-23-20 @ 09:02)  HR: 65 (09-23-20 @ 10:35) (65 - 78)  BP: 147/90 (09-23-20 @ 10:35) (144/93 - 147/90)  RR: 18 (09-23-20 @ 09:02) (18 - 18)  SpO2: 98% (09-23-20 @ 09:02) (98% - 98%)    PHYSICAL EXAM:  GENERAL: NAD, well-developed  HEAD:  Atraumatic, Normocephalic  EYES: EOMI, PERRLA, conjunctiva and sclera clear  ENT: Normal tympanic membrane. No nasal obstruction or discharge. No tonsillar exudate, swelling or erythema.  NECK: Supple, No JVD  RESP Clear to auscultation bilaterally; No wheeze  CARD: Regular rate and rhythm; No murmurs, rubs, or gallops  GASTRO: Soft, Nontender, Nondistended; Bowel sounds present  EXTREMITIES:  2+ Peripheral Pulses, No clubbing, cyanosis, or edema  PSYCH: AAOx3  NEUROLOGY: non-focal  SKIN: No rashes or lesions

## 2020-09-23 NOTE — H&P ADULT - ASSESSMENT
50 male with history of CAD with multivessel stents and MI (2017 at [facility] with restenosis of RCA and LAD s/p PEPE to mRCA), LV apical thrombus (been on warfarin since 2017), HFrEF (35-40% in 2018), syncope with 2:1 heart block s/p Medtronic AICD 5/2018 (DDD ), CVA (6/2019) and with unstable angina in 11/2019 s/p PCI/stent to mLCx at [facility], previously follows at [facility] with Dr. Crook, presents to [facility] ED for severe left sided chest pain for 3 weeks that has worsened in 1 day admitted for ACS workup.     admit under medicine to Dr. Avel Lang NPO in anticipation for cardiac cath. Can resume diet after catheter.   vitals per routine  bed tele  activity ambulate as tolerated  Cardiology consulted    ACS work up  Pt has extensive cardiac hx including CAD, multiple stens placement, and follows closely with Dr. Rush at  Cardio  pt took ASA 81 in am, given 243mg . c/w with DAPT  nitroglycerin x 1 stat, c/w with statin  PRN morphine and nitro for severe chest pain.   ekg   cardiology consulted  npo except meds in anticipation for cardiac cath planned for today      CAD s/p multivessel stents  - c/w with asa 81 and plavix  - c/w with home cardiac meds with coreg, isosorbide, spironolactone  - cardiology following    Chronic angina  -c/w with ranolazine 500 mg  - cardiology following    HTN  - c.w with home medications with holding parameters    Hx of CHFrEF  - s/p medronic placement  - last ef 35-40%  - c/w with acei, spironolactone, and cardiac meds as listed above.   - cardiology following   -TTE planned after catheter      FULL CODE    Dispo: Pt will likely be able to be discharged within 24 hours after cath, dependent on results. Girlfriend Elza and Brother Victorino are HCP . Phone number: [phone], Brother: [phone] 50 male with history of CAD with multivessel stents and MI (2017 at [facility] with restenosis of RCA and LAD s/p PEPE to mRCA), LV apical thrombus (been on warfarin since 2017), HFrEF (35-40% in 2018), syncope with 2:1 heart block s/p Medtronic AICD 5/2018 (DDD ), CVA (6/2019) and with unstable angina in 11/2019 s/p PCI/stent to mLCx at [facility], previously follows at [facility] with Dr. Crook, presents to [facility] ED for severe left sided chest pain for 3 weeks that has worsened in 1 day admitted for ACS workup.     admit under medicine to Dr. Avel Lang NPO in anticipation for cardiac cath. Can resume diet after catheter.   vitals per routine  bed tele  activity ambulate as tolerated  Cardiology consulted    ACS work up  Pt has extensive cardiac hx including CAD, multiple stens placement, and follows closely with Dr. Rush at  Cardio  pt took ASA 81 in am, given 243mg . c/w with DAPT  nitroglycerin x 1 stat, c/w with statin  PRN morphine and nitro for severe chest pain.   ekg   cardiology consulted  npo except meds in anticipation for cardiac cath planned for today      CAD s/p multivessel stents  - c/w with asa 81 and plavix  - c/w with home cardiac meds with coreg, isosorbide, spironolactone  - cardiology following    Chronic angina  -c/w with ranolazine 500 mg  - cardiology following    HTN  - c.w with home medications with holding parameters    Hx of CHFrEF  - s/p medronic placement  - last ef 55-60%  - c/w with acei, spironolactone, and cardiac meds as listed above.   - cardiology following   -TTE planned after catheter      FULL CODE    Dispo: Pt will likely be able to be discharged within 24 hours after cath, dependent on results. Girlfriend Elza and Brother Victorino are HCP . Phone number: [phone], Brother: [phone] 50 male with history of CAD with multivessel stents and MI (2017 at [facility] with restenosis of RCA and LAD s/p PEPE to mRCA), LV apical thrombus (been on warfarin since 2017), HFrEF (35-40% in 2018), syncope with 2:1 heart block s/p Medtronic AICD 5/2018 (DDD ), CVA (6/2019) and with unstable angina in 11/2019 s/p PCI/stent to mLCx at [facility], previously follows at [facility] with Dr. Crook, presents to [facility] ED for severe left sided chest pain for 3 weeks that has worsened in 1 day admitted for ACS workup.     admit under medicine to Dr. Avel Lang NPO in anticipation for cardiac cath. Can resume diet after catheter.   vitals per routine  bed tele  activity ambulate as tolerated  Cardiology consulted    ACS work up  Pt has extensive cardiac hx including CAD, multiple stens placement, and follows closely with Dr. Rush at  Cardio  pt took ASA 81 in am, given 243mg . c/w with DAPT  nitroglycerin x 1 stat, c/w with statin  PRN morphine and nitro for severe chest pain.   ekg   cardiology consulted  npo except meds in anticipation for cardiac cath planned for today      CAD s/p multivessel stents  - c/w with asa 81 and plavix  - c/w with home cardiac meds with coreg, isosorbide, spironolactone  - cardiology following    Chronic angina  -c/w with ranolazine 500 mg  - cardiology following    HTN  - c.w with home medications with holding parameters    Hx of CHFrEF  - s/p medronic placement  - last ef 55-60%  - c/w with acei, spironolactone, and cardiac meds as listed above.   - cardiology following   -TTE planned after catheter    DVT prophylaxis  - heparin 5000 u q 12 sq    FULL CODE    Dispo: Pt will likely be able to be discharged within 24 hours after cath, dependent on results. Girlfriend Elza and Brother Victorino are HCP . Phone number: [phone], Brother: [phone]

## 2020-09-23 NOTE — ED ADULT NURSE REASSESSMENT NOTE - NS ED NURSE REASSESS COMMENT FT1
pt. a&ox3. pt. denies pain or discomfort at this time, breathing even and unlabored. pt. a&ox3. pt. c/o 6/10 midsternal chest pain. breathing even and unlabored.

## 2020-09-23 NOTE — CONSULT NOTE ADULT - ATTENDING COMMENTS
50M follows with my office for recurrent angina due to severe instent-restenosis of dLAD stent, refractory to beta-blocker, nitrate and ranolazine, presents with recurrent angina, will admit for PCI/laser atherectomy today.   -repeat limited TTE with Definity to reassess h/o LV apical thrombus now off warfarin >2 weeks.   -continue indefinitely DAPT given ISR.       Mulugeta Rush DO, [facility]  Faculty Non-Invasive Cardiologist  [phone]

## 2020-09-23 NOTE — DISCHARGE NOTE PROVIDER - HOSPITAL COURSE
50 male with history of CAD with multivessel stents and MI (2017 at [facility] with restenosis of RCA and LAD s/p PEPE to mRCA), LV apical thrombus (been on warfarin since 2017), HFrEF (35-40% in 2018), syncope with 2:1 heart block s/p Medtronic AICD 5/2018 (DDD ), CVA (6/2019) and with unstable angina in 11/2019 s/p PCI/stent to mLCx at [facility], previously follows at [facility] with Dr. Crook, presents to [facility] ED for severe left sided chest pain for 3 weeks that has worsened in 1 day admitted for ACS workup. Patient has known instent stenosis of the LAD with 70-80% stenosis. Plan was for medical management and intervention if symptomatic due to small nature of vessels. Patient underwent cath yesterday and received 2 stent in the LAD. Patient had echo that showed calcified mitral thrombus with low risk of thromboembolisation. Will not discharge on coumadin. Patient to follow up with Dr. Rush on discharge.    Vital Signs Last 24 Hrs  T(C): 36.7 (23 Sep 2020 15:33), Max: 36.9 (23 Sep 2020 14:56)  T(F): 98 (23 Sep 2020 15:33), Max: 98.4 (23 Sep 2020 14:56)  HR: 74 (24 Sep 2020 08:01) (65 - 81)  BP: 112/79 (24 Sep 2020 08:01) (112/79 - 155/91)  BP(mean): --  RR: 16 (24 Sep 2020 08:01) (16 - 20)  SpO2: 97% (23 Sep 2020 21:00) (96% - 99%)    PHYSICAL EXAM:  General: Well developed; well nourished; in no acute distress  Eyes: PERRLA, EOMI; conjunctiva and sclera clear  Head: Normocephalic; atraumatic  ENMT: No nasal discharge; airway clear  Neck: Supple; non tender; no masses  Respiratory: No wheezes, rales or rhonchi  Cardiovascular: Regular rate and rhythm. S1 and S2 Normal; No murmurs, gallops or rubs  Gastrointestinal: Soft non-tender non-distended; Normal bowel sounds  Genitourinary: No costovertebral angle tenderness  Extremities: Normal range of motion, No clubbing, cyanosis or edema  Vascular: Peripheral pulses palpable 2+ bilaterally  Neurological: Alert and oriented x4  Skin: Warm and dry. No acute rash  Psychiatric: Cooperative and appropriate    discharge time 40 minutes

## 2020-09-23 NOTE — ED ADULT NURSE NOTE - PMH
CAD (coronary artery disease)    Former smoker    Heart block  2:1 HB   dual chamber AICD (DDD ) in May 2018 [facility] (David Mahmood)  HTN (hypertension)    MI (myocardial infarction)  2 coronary stents  Status post CVA  residual rt. lower ext weakness.  Stented coronary artery    Thrombosis  apical

## 2020-09-23 NOTE — ED PROVIDER NOTE - PMH
CAD (coronary artery disease)    Former smoker    Heart block  2:1 HB   dual chamber AICD (DDD ) in May 2018 [facility] (David Mahmood)  HTN (hypertension)    MI (myocardial infarction)  2 coronary stents  Status post CVA  residual rt. lower ext weakness.  Stented coronary artery    Thrombosis  apical

## 2020-09-23 NOTE — PROGRESS NOTE ADULT - ASSESSMENT
A/P: 50M Creole-speaking man with history of CAD with multivessels stentings and MI (2017 at [facility] with restenosis of RCA and LAD s/p PEPE to mRCA), LV apical thrombus (been on warfarin since 2017), HFrEF (35-40% in 2018), syncope with 2:1 heart block s/p Medtronic AICD 5/2018 (DDD ), CVA (6/2019) and with unstable angina in 11/2019 s/p PCI/stent to mLCx at [facility], previously follows at [facility] with Dr. Crook but since left practice without follow up, presented to [facility]ED on 9/2/20 with unstable angina, serial Troponin negative, repeaT TTE with EF 40-45% (upon my own review) and resolved LV apical thrombus (known since 11/2019 but unclear reason remains on warfarin and self discontinued clopidogrel 75mg since 7/2019), underwent LHC found with patent stents in LCx, RCA and moderate-severe instent restenosis of pax-fw-dhghmx LAD, given small caliber size of the vessel decided medical management with antianginal (increased Imdur to 120mg). Patient c/t c/o anginal symptoms in the morning, at night, and at times w/ambulation, as well as some dizziness. Currently asymptomatic. Denies palpitations, irregular and/or rapid heart beat, SOB, NORIEGA, syncope/near syncope, orthopnea, PND, cough, edema, f/c, n/v/d, hematuria, or hematochezia. Now s/p  Angiosculpt and PEPE x 2 to mid-distal LAD (ISR) via RFA closed with angioseal device by Dr. Escobar.  Patient with minimal chest discomfort at this time.  -Admit to tele  -Bedrest x 2 hours until 2000 then OOB  -Hopeful discharge to home in the am  -Follow up with Dr. Rush in one to two weeks as outpatient  -c/w DAPT--ASA/Plavix  -c/w coreg 12.5mg PO BID  -c/w Crestor 40mg PO qHS  -c/w Lisinopril 20mg PO QD  -Benefits of ASA/Plavix emphasized with patient verbal understanding  -Lifestyle mods/diet/activities/meds discussed with patient verbal understanding  -Discussed with Dr. Escobar

## 2020-09-23 NOTE — H&P ADULT - HISTORY OF PRESENT ILLNESS
50 male with history of CAD with multivessels stentings and MI (2017 at [facility] with restenosis of RCA and LAD s/p PEPE to mRCA), LV apical thrombus (been on warfarin since 2017), HFrEF (35-40% in 2018), syncope with 2:1 heart block s/p Medtronic AICD 5/2018 (DDD ), CVA (6/2019) and with unstable angina in 11/2019 s/p PCI/stent to mLCx at [facility], previously follows at [facility] with Dr. Crook, presents to [facility] ED for severe left sided chest pain that has worsened in 1 day. Pt states pain has been present for 3 weeks but gradually worsening, and has been on and off, feels like a pressure like pain, that radiates to his arm, where he also has tingling sensation. Pt states he also has had some blurry vision, headaches on and off and arm weakness since pain began 3 weeks ago. Pt has extensive cardiac hx as stated above and closely follow up with  Cardio, was seen in [facility] ED 2 weeks ago for similar complaints and medications were adjusted at the time. Cardiology consulted and at bedside and pt is planned for cath today. ROS otherwise negative

## 2020-09-23 NOTE — ED PROVIDER NOTE - PSH
Cardiac defibrillator in place  2017  Medtronic Visa AF MRI VR Surescan ICD   Model# [id number], SN# [id number]

## 2020-09-23 NOTE — ED PROVIDER NOTE - OBJECTIVE STATEMENT
49 y/o male with PMHx of CAD, Former smoker, Heart block  2:1 HB, dual chamber AICD (DDD ) in May 2018 [facility] (Dr. Crook David), HTN, MI s/p 2 coronary stents, Status post CVA  residual rt. lower ext weakness, Stented coronary artery, and Thrombosis  apical presents to ED c/o chest pain. Patient reports left sided chest pain, feels like pressure and radiates to the left arm that started yesterday. Patient also reports SOB. The pain started yesterday after using an exercise bike. Patient was recently in [facility] and had a cardiac catheretization and stenosis in the LAD stent was found, and opted for medical management.   Denies fevers, syncope; Patient follows with [facility]. 51 y/o male with PMHx of CAD, Former smoker, Heart block  2:1 HB, dual chamber AICD (DDD ) in May 2018 [facility] (Dr. Crook David), HTN, MI s/p 2 coronary stents, Status post CVA  residual rt. lower ext weakness presents to ED c/o chest pain. Patient reports left sided chest pain, feels like pressure and radiates to the left arm that started yesterday. Patient also reports SOB. The pain started yesterday after using an exercise bike. Patient was recently in [facility] and had a cardiac catheretization and stenosis in the LAD stent was found, and opted for medical management.   Denies fevers, syncope; Patient follows with [facility].

## 2020-09-23 NOTE — H&P ADULT - ATTENDING COMMENTS
Pt seen    1) Chest pain with hx of CAD s/p PCI  - admit to tele  - per cardio for Laser arthrectomy to dLAD today  - continue aspirin, plavix, statin  - keep npo for now  - echo ordered    I agree with above assessment and plan.

## 2020-09-23 NOTE — CONSULT NOTE ADULT - SUBJECTIVE AND OBJECTIVE BOX
[facility]                                                               Office:  [address] [address]                                                              Telephone: [phone]. Fax:[phone]                                                                        CARDIOLOGY CONSULTATION NOTE                                                                                             Consult requested by:  Dr. Pearson  Reason for Consultation: Chest Pain  History obtained by: Patient and medical record   obtained: No    Chief complaint:    Patient is a 50y old  Male who presents with a chief complaint of chest pain.      HPI:  50M Creole-speaking man with history of CAD with multivessels stentings and MI (2017 at [facility] with restenosis of RCA and LAD s/p PEPE to mRCA), LV apical thrombus (been on warfarin since 2017), HFrEF (35-40% in 2018), syncope with 2:1 heart block s/p Medtronic AICD 5/2018 (DDD ), CVA (6/2019) and with unstable angina in 11/2019 s/p PCI/stent to mLCx at [facility], previously follows at [facility] with Dr. Crook but since left practice without follow up, presented to [facility] on 9/2/20 with unstable angina, serial Troponin negative, repeaT TTE with EF 40-45% (upon my own review) and resolved LV apical thrombus (known since 11/2019 but unclear reason remains on warfarin and self discontinued clopidogrel 75mg since 7/2019), underwent LHC found with patent stents in LCx, RCA and moderate-severe instent restenosis of [id number] LAD, given small caliber size of the vessel decided medical management with antianginal (increased Imdur to 120mg). Patient c/t c/o anginal symptoms in the morning, at night, and at times w/ambulation, as well as some dizziness. Currently asymptomatic. Denies palpitations, irregular and/or rapid heart beat, SOB, NORIEGA, syncope/near syncope, orthopnea, PND, cough, edema, f/c, n/v/d, hematuria, or hematochezia.       REVIEW OF SYMPTOMS:     CONSTITUTIONAL: No fever, weight loss, or fatigue  ENMT:  No difficulty hearing, tinnitus, vertigo; No sinus or throat pain  NECK: No pain or stiffness  CARDIOVASCULAR: as per HPI  RESPIRATORY: No Dyspnea on exertion, Shortness of breath, cough, wheezing  : No dysuria, no hematuria   GI: No dark color stool, no melena, no diarrhea, no constipation, no abdominal pain   NEURO: No headache, no dizziness, no slurred speech   MUSCULOSKELETAL: No joint pain or swelling; No muscle, back, or extremity pain  PSYCH: No agitation, no anxiety.    ALL OTHER REVIEW OF SYSTEMS ARE NEGATIVE.      PREVIOUS DIAGNOSTIC TESTING  ECHO FINDINGS:  < from: TTE Echo Complete w/ Contrast w/ Doppler (09.02.20 @ 18:42) >  Summary:   1. Endocardial visualization was enhanced with intravenous echo contrast.   2. Left ventricular ejection fraction, by visual estimation, is 55 to 60%.   3. Normal global left ventricular systolic function.   4. Multiple left ventricular regional wall motion abnormalities exist. See wall motion findings.   5. Normal left ventricular internal cavity size.   6. There is moderate concentric left ventricular hypertrophy.   7. Normal left atrial size.   8. Normal right atrial size.   9. Mild thickening of the anterior and posterior mitral valve leaflets.  10. Trace mitral valve regurgitation.  11. Sclerotic aortic valve with normal opening.  12. There is no evidence of pericardial effusion.    MD Adrienne Electronically signed on 9/3/2020 at 9:40:24 AM    < end of copied text >      STRESS FINDINGS:  < from: Nuclear Stress Test-Exercise (Nuclear Stress Test-Exercise .) (05.15.18 @ 12:35) >  IMPRESSIONS:Abnormal Study  * Exercise capacity: 11 METS, Average for age and gender.  93% of MPHR.  * Chest Pain: No chest pain with exercise.  * HR Response: Appropriate.  * BP Response: Appropriate.  * Heart Rhythm: Sinus Rhythm - 85 BPM.  * ECG Abnormalities: There were no diagnostic changes.  * Arrhythmia: occasional PVCs in the early exercise period  and recvoery period.  * There is a medium sized, mild to moderate defect in  inferior wall that is predominantly fixed, suggestive of  infarction with minimal austin-infarct ischemia.There is a  medium sized, severe defect in apical walls that is  predominantly fixed, suggestive of infarction with minimal  austin-infarct ischemia.  *  Apical walls akinetic. Inferior wall akinetic. Post  stress LVEF 36%.    Conclusion:  There is a medium sized, mild to moderate defect in  inferior wall that is predominantly fixed, suggestive of  infarction with minimal austin-infarct ischemia.There is a  medium sized, severe defect in apical walls that is  predominantly fixed, suggestive of infarction with minimal  austin-infarct ischemia.    < end of copied text >      CATHETERIZATION FINDINGS:   < from: Cardiac Cath Lab - Adult (09.04.20 @ 07:43) >  CORONARY VESSELS: Thecoronary circulation is right dominant.  LM:   --  LM: Normal.  LAD:   --  Mid LAD: There was a 20 % stenosis. In a second lesion, there  was a diffuse 70 % stenosis in-stent.  --  Distal LAD: There was a diffuse 70 % stenosis in-stent. In a second  lesion, there was a 80 % stenosis.  CX:   --  Mid circumflex: There was a 0 % stenosis in-stent.  RCA:   --  Mid RCA: There was a 20 % stenosis in-stent. Superior takeoff.  COMPLICATIONS: No complications occurred during the cath lab visit.  DIAGNOSTIC IMPRESSIONS: Patent RCA and circumflex stents.  Diffuse mid to distal LAD instent restenosis in a 2.25/2.5 mm stents.  DIAGNOSTIC RECOMMENDATIONS: Considering small caliber vessel, diffuse  nature of disease, patient needs to be optimized on antianginal therapy.  If fails, will consider laser atherectomy/PCI to LAD. Would require  40-50mm of small diameter stents to address diffuse disease.  Prepared and signed by  Greg Escobar MD  Signed 09/04/2020 14:59:37    < end of copied text >        ALLERGIES: Allergies    No Known Allergies    Intolerances          PAST MEDICAL HISTORY  Status post CVA    Former smoker    Heart block    CAD (coronary artery disease)    Stented coronary artery    Thrombosis    MI (myocardial infarction)    HTN (hypertension)        PAST SURGICAL HISTORY  Cardiac defibrillator in place    No significant past surgical history        FAMILY HISTORY:  Family history of MI (myocardial infarction)  father at age 66.        SOCIAL HISTORY:  Denies smoking/alcohol/drugs  CIGARETTES:     ALCOHOL:  DRUGS:      CURRENT MEDICATIONS:           HOME MEDICATIONS:  Aspirin 81 81 MG Oral Tablet Chewable  Carvedilol 12.5 MG Oral Tablet; TAKE 1 TABLET TWICE DAILY WITH MEALS  Clopidogrel Bisulfate 75 MG Oral Tablet  Crestor 40 MG Oral Tablet  Gabapentin 100 MG Oral Capsule; TAKE 1 CAPSULE AT BEDTIME  	Lisinopril 20 MG Oral 	Tablet; TAKE 1 TABLET 	DAILY AS DIRECTED    Vital Signs Last 24 Hrs  T(C): 37 (23 Sep 2020 09:02), Max: 37 (23 Sep 2020 09:02)  T(F): 98.6 (23 Sep 2020 09:02), Max: 98.6 (23 Sep 2020 09:02)  HR: 78 (23 Sep 2020 09:02) (78 - 78)  BP: 144/93 (23 Sep 2020 09:02) (144/93 - 144/93)  BP(mean): --  RR: 18 (23 Sep 2020 09:02) (18 - 18)  SpO2: 98% (23 Sep 2020 09:02) (98% - 98%)      PHYSICAL EXAM:  Constitutional: Comfortable. No acute distress.   HEENT: Atraumatic and normocephalic, neck is supple. No JVD. No carotid bruit. PEERL   CNS: A&Ox3. No focal deficits. EOMI. Cranial nerves II-IX are intact.   Lymph Nodes: Cervical: Not palpable.  Respiratory: CTAB  Cardiovascular: S1S2 RRR. No murmur/rubs or gallop.  Gastrointestinal: Soft non-tender and non distended. +Bowel sounds. Negative Hidalgo's sign.  Extremities: No edema.   Psychiatric: Calm. No agitation.  Skin: No skin rash/ulcers visualized to face, hands or feet.    Intake and output:     LABS:            ;p-BNP=        INTERPRETATION OF TELEMETRY: Reviewed by me.   ECG: Reviewed by me.     RADIOLOGY & ADDITIONAL STUDIES:    X-ray:  reviewed by me.     CT scan:   MRI:

## 2020-09-23 NOTE — DISCHARGE NOTE PROVIDER - CARE PROVIDER_API CALL
Mulugeta Rush (DO)  Cardiology; Internal Medicine  [address], [place] [address]  Phone: [phone]  Fax: [phone]  Follow Up Time:

## 2020-09-23 NOTE — DISCHARGE NOTE PROVIDER - NSDCFUADDAPPT_GEN_ALL_CORE_FT
Follow up with Dr. Rush in one to two weeks    No heavy lifting, driving, sex, tub baths, swimming, or any activity that submerges the lower half of the body in water for 48 hours.  Limited walking and stairs for 48 hours.    Change the bandaid after 24 hours and every 24 hours after that.  Keep the puncture site dry and covered with a bandaid until a scab forms.    Observe the site frequently.  If bleeding or a large lump (the size of a golf ball or bigger) occurs lie flat, apply continuous direct pressure just above the puncture site for at least 10 minutes, and notify your physician immediately.  If the bleeding cannot be controlled, call 911 immediately for assistance.  Notify your physician of pain, swelling or any drainage.    Notify your physician immediately if coldness, numbness, discoloration or pain in your foot occurs.

## 2020-09-23 NOTE — ED PROVIDER NOTE - ATTENDING CONTRIBUTION TO CARE
Michel: I performed a face to face bedside interview with patient regarding history of present illness, review of symptoms and past medical history. I completed an independent physical exam and ordered tests/medications as needed.  I have discussed patient's plan of care with the resident. The resident assisted in  executing the discussed plan. I was available for any questions or issues that may have arose during the execution of the plan of care.

## 2020-09-23 NOTE — DISCHARGE NOTE PROVIDER - NSDCMRMEDTOKEN_GEN_ALL_CORE_FT
aspirin 81 mg oral delayed release tablet: 1 tab(s) orally once a day  buy over the counter from pharmacy  atorvastatin 80 mg oral tablet: 1 tab(s) orally once a day (at bedtime)  carvedilol 12.5 mg oral tablet: 1 tab(s) orally 2 times a day  home  clopidogrel 75 mg oral tablet: 1 tab(s) orally once a day  gabapentin 100 mg oral tablet: orally once a day (at bedtime)  isosorbide mononitrate 60 mg oral tablet, extended release: 1 tab(s) orally once a day (in the morning)  lisinopril 20 mg oral tablet: 1 tab(s) orally once a day  reports taking half a pill 2/2 hypotension at home  nitroglycerin 0.4 mg sublingual tablet:   ranolazine 500 mg oral tablet, extended release:   spironolactone 25 mg oral tablet: 1 tab(s) orally once a day   aspirin 81 mg oral delayed release tablet: 1 tab(s) orally once a day  buy over the counter from pharmacy  atorvastatin 80 mg oral tablet: 1 tab(s) orally once a day (at bedtime)  carvedilol 25 mg oral tablet: 1 tab(s) orally every 12 hours  clopidogrel 75 mg oral tablet: 1 tab(s) orally once a day  gabapentin 100 mg oral tablet: orally once a day (at bedtime)  isosorbide mononitrate 60 mg oral tablet, extended release: 1 tab(s) orally once a day (in the morning)  lisinopril 20 mg oral tablet: 1 tab(s) orally once a day  reports taking half a pill 2/2 hypotension at home  nitroglycerin 0.4 mg sublingual tablet:   ranolazine 500 mg oral tablet, extended release:   spironolactone 25 mg oral tablet: 1 tab(s) orally once a day

## 2020-09-23 NOTE — PROGRESS NOTE ADULT - SUBJECTIVE AND OBJECTIVE BOX
Cardiology NP post procedure note:    -s/p Angiosculpt and PEPE x 2 to mid-distal LAD (ISR) via RFA closed with angioseal device by Dr. Escobar  Synergy 2.25 x 38 mm  Synergy 2.5 x 20 mm    EKG post PCI: NSR 1st degree AV block 77 bpm   TELE: NSR 70s    MEDICATIONS  (STANDING):  aspirin enteric coated 81 milliGRAM(s) Oral daily  atorvastatin 80 milliGRAM(s) Oral at bedtime  carvedilol 12.5 milliGRAM(s) Oral two times a day  lisinopril 20 milliGRAM(s) Oral daily  ranolazine 500 milliGRAM(s) Oral two times a day  spironolactone 25 milliGRAM(s) Oral daily    MEDICATIONS  (PRN):  morphine  - Injectable 2 milliGRAM(s) IV Push once PRN Severe Pain (7 - 10)  nitroglycerin     SubLingual 0.4 milliGRAM(s) SubLingual every 5 minutes PRN Chest Pain      Allergies:  No Known Allergies      PAST MEDICAL & SURGICAL HISTORY:  Status post CVA  residual rt. lower ext weakness.    Former smoker    Heart block  2:1 HB   dual chamber AICD (DDD ) in May 2018 [facility] (Dr. Crook David)    CAD (coronary artery disease)    Stented coronary artery    Thrombosis  apical    MI (myocardial infarction)  2 coronary stents    HTN (hypertension)    Cardiac defibrillator in place  2017  Medtronic Visa AF MRI VR Surescan ICD   Model# [id number], SN# [id number]        Vital Signs Last 24 Hrs  T(C): 36.7 (23 Sep 2020 15:33), Max: 37 (23 Sep 2020 09:02)  T(F): 98 (23 Sep 2020 15:33), Max: 98.6 (23 Sep 2020 09:02)  HR: 69 (23 Sep 2020 18:15) (65 - 78)  BP: 141/95 (23 Sep 2020 18:15) (130/88 - 150/93)  BP(mean): --  RR: 16 (23 Sep 2020 18:15) (16 - 18)  SpO2: 98% (23 Sep 2020 18:15) (97% - 98%)    Physical Exam:  Constitutional: NAD, AAOx3  Cardiovascular: +S1S2 RRR  Pulmonary: CTA b/l, unlabored  GI: soft NTND +BS  Extremities: no pedal edema, +distal pulses b/l  Neuro: non focal, PINEDO x4  Procedure site: RFA angioseal site benign without hematoma/bleeding; no bruit; + right PP    LABS:                        16.0   4.75  )-----------( 159      ( 23 Sep 2020 10:13 )             48.2     09-23    140  |  101  |  9.0  ----------------------------<  83  4.1   |  26.0  |  0.95    Ca    9.5      23 Sep 2020 10:13    TPro  7.0  /  Alb  4.2  /  TBili  0.8  /  DBili  x   /  AST  24  /  ALT  22  /  AlkPhos  78  09-23    PT/INR - ( 23 Sep 2020 11:52 )   PT: 13.7 sec;   INR: 1.19 ratio          RADIOLOGY & ADDITIONAL TESTS:

## 2020-09-24 ENCOUNTER — TRANSCRIPTION ENCOUNTER (OUTPATIENT)
Age: 50
End: 2020-09-24

## 2020-09-24 VITALS
SYSTOLIC BLOOD PRESSURE: 116 MMHG | RESPIRATION RATE: 16 BRPM | DIASTOLIC BLOOD PRESSURE: 74 MMHG | OXYGEN SATURATION: 98 % | HEART RATE: 72 BPM

## 2020-09-24 LAB
ANION GAP SERPL CALC-SCNC: 12 MMOL/L — SIGNIFICANT CHANGE UP (ref 5–17)
ANION GAP SERPL CALC-SCNC: 13 MMOL/L — SIGNIFICANT CHANGE UP (ref 5–17)
BASOPHILS # BLD AUTO: 0.02 K/UL — SIGNIFICANT CHANGE UP (ref 0–0.2)
BASOPHILS NFR BLD AUTO: 0.3 % — SIGNIFICANT CHANGE UP (ref 0–2)
BUN SERPL-MCNC: 15 MG/DL — SIGNIFICANT CHANGE UP (ref 8–20)
BUN SERPL-MCNC: 17 MG/DL — SIGNIFICANT CHANGE UP (ref 8–20)
CALCIUM SERPL-MCNC: 9 MG/DL — SIGNIFICANT CHANGE UP (ref 8.6–10.2)
CALCIUM SERPL-MCNC: 9.2 MG/DL — SIGNIFICANT CHANGE UP (ref 8.6–10.2)
CHLORIDE SERPL-SCNC: 100 MMOL/L — SIGNIFICANT CHANGE UP (ref 98–107)
CHLORIDE SERPL-SCNC: 102 MMOL/L — SIGNIFICANT CHANGE UP (ref 98–107)
CO2 SERPL-SCNC: 26 MMOL/L — SIGNIFICANT CHANGE UP (ref 22–29)
CO2 SERPL-SCNC: 28 MMOL/L — SIGNIFICANT CHANGE UP (ref 22–29)
CREAT SERPL-MCNC: 0.9 MG/DL — SIGNIFICANT CHANGE UP (ref 0.5–1.3)
CREAT SERPL-MCNC: 0.93 MG/DL — SIGNIFICANT CHANGE UP (ref 0.5–1.3)
EOSINOPHIL # BLD AUTO: 0.07 K/UL — SIGNIFICANT CHANGE UP (ref 0–0.5)
EOSINOPHIL NFR BLD AUTO: 1.1 % — SIGNIFICANT CHANGE UP (ref 0–6)
GLUCOSE SERPL-MCNC: 94 MG/DL — SIGNIFICANT CHANGE UP (ref 70–99)
GLUCOSE SERPL-MCNC: 95 MG/DL — SIGNIFICANT CHANGE UP (ref 70–99)
HCT VFR BLD CALC: 47.2 % — SIGNIFICANT CHANGE UP (ref 39–50)
HGB BLD-MCNC: 15.8 G/DL — SIGNIFICANT CHANGE UP (ref 13–17)
IMM GRANULOCYTES NFR BLD AUTO: 0.2 % — SIGNIFICANT CHANGE UP (ref 0–1.5)
LYMPHOCYTES # BLD AUTO: 2.31 K/UL — SIGNIFICANT CHANGE UP (ref 1–3.3)
LYMPHOCYTES # BLD AUTO: 35.9 % — SIGNIFICANT CHANGE UP (ref 13–44)
MAGNESIUM SERPL-MCNC: 1.8 MG/DL — SIGNIFICANT CHANGE UP (ref 1.6–2.6)
MAGNESIUM SERPL-MCNC: 1.9 MG/DL — SIGNIFICANT CHANGE UP (ref 1.6–2.6)
MCHC RBC-ENTMCNC: 29.5 PG — SIGNIFICANT CHANGE UP (ref 27–34)
MCHC RBC-ENTMCNC: 33.5 GM/DL — SIGNIFICANT CHANGE UP (ref 32–36)
MCV RBC AUTO: 88.2 FL — SIGNIFICANT CHANGE UP (ref 80–100)
MONOCYTES # BLD AUTO: 0.93 K/UL — HIGH (ref 0–0.9)
MONOCYTES NFR BLD AUTO: 14.5 % — HIGH (ref 2–14)
NEUTROPHILS # BLD AUTO: 3.09 K/UL — SIGNIFICANT CHANGE UP (ref 1.8–7.4)
NEUTROPHILS NFR BLD AUTO: 48 % — SIGNIFICANT CHANGE UP (ref 43–77)
PLATELET # BLD AUTO: 156 K/UL — SIGNIFICANT CHANGE UP (ref 150–400)
POTASSIUM SERPL-MCNC: 3.5 MMOL/L — SIGNIFICANT CHANGE UP (ref 3.5–5.3)
POTASSIUM SERPL-MCNC: 3.7 MMOL/L — SIGNIFICANT CHANGE UP (ref 3.5–5.3)
POTASSIUM SERPL-SCNC: 3.5 MMOL/L — SIGNIFICANT CHANGE UP (ref 3.5–5.3)
POTASSIUM SERPL-SCNC: 3.7 MMOL/L — SIGNIFICANT CHANGE UP (ref 3.5–5.3)
RBC # BLD: 5.35 M/UL — SIGNIFICANT CHANGE UP (ref 4.2–5.8)
RBC # FLD: 11.9 % — SIGNIFICANT CHANGE UP (ref 10.3–14.5)
SARS-COV-2 IGG SERPL QL IA: NEGATIVE — SIGNIFICANT CHANGE UP
SARS-COV-2 IGM SERPL IA-ACNC: <3.8 AU/ML — SIGNIFICANT CHANGE UP
SODIUM SERPL-SCNC: 140 MMOL/L — SIGNIFICANT CHANGE UP (ref 135–145)
SODIUM SERPL-SCNC: 141 MMOL/L — SIGNIFICANT CHANGE UP (ref 135–145)
WBC # BLD: 6.43 K/UL — SIGNIFICANT CHANGE UP (ref 3.8–10.5)
WBC # FLD AUTO: 6.43 K/UL — SIGNIFICANT CHANGE UP (ref 3.8–10.5)

## 2020-09-24 PROCEDURE — 99239 HOSP IP/OBS DSCHRG MGMT >30: CPT

## 2020-09-24 PROCEDURE — 93308 TTE F-UP OR LMTD: CPT | Mod: 26

## 2020-09-24 PROCEDURE — 99233 SBSQ HOSP IP/OBS HIGH 50: CPT

## 2020-09-24 PROCEDURE — 93010 ELECTROCARDIOGRAM REPORT: CPT

## 2020-09-24 RX ORDER — CARVEDILOL PHOSPHATE 80 MG/1
25 CAPSULE, EXTENDED RELEASE ORAL EVERY 12 HOURS
Refills: 0 | Status: DISCONTINUED | OUTPATIENT
Start: 2020-09-24 | End: 2020-09-24

## 2020-09-24 RX ORDER — ISOSORBIDE MONONITRATE 60 MG/1
60 TABLET, EXTENDED RELEASE ORAL DAILY
Refills: 0 | Status: DISCONTINUED | OUTPATIENT
Start: 2020-09-24 | End: 2020-09-24

## 2020-09-24 RX ORDER — MAGNESIUM SULFATE 500 MG/ML
2 VIAL (ML) INJECTION ONCE
Refills: 0 | Status: COMPLETED | OUTPATIENT
Start: 2020-09-24 | End: 2020-09-24

## 2020-09-24 RX ORDER — CARVEDILOL PHOSPHATE 80 MG/1
1 CAPSULE, EXTENDED RELEASE ORAL
Qty: 0 | Refills: 0 | DISCHARGE

## 2020-09-24 RX ORDER — CARVEDILOL PHOSPHATE 80 MG/1
1 CAPSULE, EXTENDED RELEASE ORAL
Qty: 60 | Refills: 0
Start: 2020-09-24 | End: 2020-10-23

## 2020-09-24 RX ORDER — POTASSIUM CHLORIDE 20 MEQ
20 PACKET (EA) ORAL
Refills: 0 | Status: DISCONTINUED | OUTPATIENT
Start: 2020-09-24 | End: 2020-09-24

## 2020-09-24 RX ORDER — CARVEDILOL PHOSPHATE 80 MG/1
0.5 CAPSULE, EXTENDED RELEASE ORAL
Qty: 0 | Refills: 0 | DISCHARGE
Start: 2020-09-24 | End: 2020-10-23

## 2020-09-24 RX ADMIN — LISINOPRIL 20 MILLIGRAM(S): 2.5 TABLET ORAL at 07:55

## 2020-09-24 RX ADMIN — Medication 20 MILLIEQUIVALENT(S): at 07:55

## 2020-09-24 RX ADMIN — CARVEDILOL PHOSPHATE 12.5 MILLIGRAM(S): 80 CAPSULE, EXTENDED RELEASE ORAL at 06:25

## 2020-09-24 RX ADMIN — Medication 50 GRAM(S): at 07:15

## 2020-09-24 RX ADMIN — RANOLAZINE 500 MILLIGRAM(S): 500 TABLET, FILM COATED, EXTENDED RELEASE ORAL at 06:25

## 2020-09-24 RX ADMIN — SPIRONOLACTONE 25 MILLIGRAM(S): 25 TABLET, FILM COATED ORAL at 07:55

## 2020-09-24 RX ADMIN — Medication 20 MILLIEQUIVALENT(S): at 10:56

## 2020-09-24 RX ADMIN — Medication 81 MILLIGRAM(S): at 10:56

## 2020-09-24 RX ADMIN — CLOPIDOGREL BISULFATE 75 MILLIGRAM(S): 75 TABLET, FILM COATED ORAL at 10:56

## 2020-09-24 NOTE — DISCHARGE NOTE NURSING/CASE MANAGEMENT/SOCIAL WORK - PATIENT PORTAL LINK FT
You can access the FollowMyHealth Patient Portal offered by [facility] by registering at the following website: [email or web address]. By joining [facility]’s FollowMyHealth portal, you will also be able to view your health information using other applications (apps) compatible with our system.

## 2020-09-24 NOTE — PROGRESS NOTE ADULT - ASSESSMENT
A/P: 50M Creole-speaking man with history of CAD with multivessels stentings and MI (2017 at [facility] with restenosis of RCA and LAD s/p PEPE to mRCA), LV apical thrombus (been on warfarin since 2017), HFrEF (35-40% in 2018), syncope with 2:1 heart block s/p Medtronic AICD 5/2018 (DDD ), CVA (6/2019) and with unstable angina in 11/2019 s/p PCI/stent to mLCx at [facility], previously follows at [facility] with Dr. Crook but since left practice without follow up, presented to [facility] on 9/2/20 with unstable angina, serial Troponin negative, repeaT TTE with EF 40-45% (upon my own review) and resolved LV apical thrombus (known since 11/2019 but unclear reason remains on warfarin and self discontinued clopidogrel 75mg since 7/2019), underwent LHC found with patent stents in LCx, RCA and moderate-severe instent restenosis of [id number]-[id number] LAD, given small caliber size of the vessel decided medical management with antianginal (increased Imdur to 120mg). Patient c/t c/o anginal symptoms in the morning, at night, and at times w/ambulation, as well as some dizziness. Currently asymptomatic. Denies palpitations, irregular and/or rapid heart beat, SOB, NORIEGA, syncope/near syncope, orthopnea, PND, cough, edema, f/c, n/v/d, hematuria, or hematochezia.  Recurrent angina due to severe instent-restenosis of dLAD stent, refractory to beta-blocker, nitrate and ranolazine, presents with recurrent angina now s/p Angiosculpt and PEPE x 2 to mid-distal LAD (ISR) via RFA closed with angioseal device by Dr. Escobar yesterday (Synergy 2.25 x 38 mm; Synergy 2.5 x 20 mm)  Overnight patient with 21 beats of VT that woke him out of sleep.  Patient remained awake and stable.  Serum potassium 3.7 and magnesium 1.8 which were supplemented.  -Repeat limited TTE with Definity today to reassess h/o LV apical thrombus now off warfarin >2 weeks.   -continue indefinitely DAPT given ISR.   -c/w coreg 12.5mg PO BID  -c/w Lipitor 80mg PO qHS  -c/w Lisinopril 20mg PO QD  -Benefits of ASA/Plavix emphasized with patient verbal understanding  -Lifestyle mods/diet/activities/meds discussed with patient verbal understanding  -Patient stable from Cardiac perspective to discharge to home today after TTE  -AC pending TTE results  -Follow up at [facility] in one to two weeks as outpatient  -cardiac rehab info provided/referral and communication to cardiac rehab completed    HFrecEF, EF now 55-60% (9/2/20)  -GDMT as above  -currently euvolemic on exam    Plan discussed w/Dr. Rush

## 2020-09-24 NOTE — PROGRESS NOTE ADULT - ATTENDING COMMENTS
50M with ischemic cardiomyopathy with multivessels PCI/stents recently admitted for unstable angina found with severe ISR of dLAD stent, small caliber, refractory to antianginals (beta-blocker, Imdur and Ranexa) and presented again with angina underwent PCI, unable to perform laser artherectomy due to equipment issue, s/p PCI/stents x2 in dLAD, noted with 21 beats NSVT, repeat EKG unchanged from baseline, K 3.7 and Mg 1.8 s/p repletion, overall -150s, will uptitrate carvedilol to 25mg BID.   -continue ASA/clopidogrel indefinitely, on statin  -continue GDMT  -continue antianginal with Imdur and Ranexa.   -repeat limited TTE today with Definity again with apical akinesis/aneurysm but without LV apical thrombus off warfarin for 2 weeks.   -follow up with me in office next week, stable for discharge home today.         Mulugeta Rush DO, [facility]  Faculty Non-Invasive Cardiologist  [phone] 50M with ischemic cardiomyopathy with multivessels PCI/stents recently admitted for unstable angina found with severe ISR of dLAD stent, small caliber, refractory to antianginals (beta-blocker, Imdur and Ranexa) and presented again with angina underwent PCI, unable to perform laser artherectomy due to equipment issue, s/p PCI/stents x2 in dLAD, noted with 21 beats NSVT, repeat EKG unchanged from baseline, K 3.7 and Mg 1.8 s/p repletion, overall -150s, will uptitrate carvedilol to 25mg BID, patient has AICD.   -continue ASA/clopidogrel indefinitely, on statin  -continue GDMT  -continue antianginal with Imdur and Ranexa.   -repeat limited TTE today with Definity again with apical akinesis/aneurysm with calcified apical mural thrombus without filling defect on Definity, overall low risk for emboli and can remain off warfarin.    -follow up with me in office next week, stable for discharge home today.         Mulugeta Rush DO, [facility]  Faculty Non-Invasive Cardiologist  [phone] 50M with ischemic cardiomyopathy with multivessels PCI/stents recently admitted for unstable angina found with severe ISR of dLAD stent, small caliber, refractory to antianginals (beta-blocker, Imdur and Ranexa) and presented again with angina underwent PCI, unable to perform laser artherectomy due to equipment issue, s/p PCI/stents x2 in dLAD, noted with 21 beats NSVT, repeat EKG unchanged from baseline, K 3.7 and Mg 1.8 s/p repletion, overall -150s, will uptitrate carvedilol to 25mg BID, patient has AICD and interrogated without event and falls out of the detection zone (167) given relately slow NSVT 130s.   -continue ASA/clopidogrel indefinitely, on statin  -continue GDMT  -continue antianginal with Imdur and Ranexa.   -repeat limited TTE today with Definity again with apical akinesis/aneurysm with calcified apical mural thrombus without filling defect on Definity, overall low risk for embolic event and can remain off warfarin.    -follow up with me in office next week, stable for discharge home today.         Mulugeta Rush DO, [facility]  Faculty Non-Invasive Cardiologist  [phone]

## 2020-09-24 NOTE — PROGRESS NOTE ADULT - SUBJECTIVE AND OBJECTIVE BOX
[place] [facility] Faculty Practice                              Office:  [address] [address]                    Telephone: [phone]. Fax:[phone]          Patient is a 50y old  Male who presents with a chief complaint of Chest pain (23 Sep 2020 18:27)        HPI:  50 male with history of CAD with multivessels stentings and MI (2017 at [facility] with restenosis of RCA and LAD s/p PEPE to mRCA), LV apical thrombus (been on warfarin since 2017), HFrEF (35-40% in 2018), syncope with 2:1 heart block s/p Medtronic AICD 5/2018 (DDD ), CVA (6/2019) and with unstable angina in 11/2019 s/p PCI/stent to mLCx at [facility], previously follows at [facility] with Dr. Crook, presents to [facility] ED for severe left sided chest pain that has worsened in 1 day. Pt states pain has been present for 3 weeks but gradually worsening, and has been on and off, feels like a pressure like pain, that radiates to his arm, where he also has tingling sensation. Pt states he also has had some blurry vision, headaches on and off and arm weakness since pain began 3 weeks ago. Pt has extensive cardiac hx as stated above and closely follow up with [facility], was seen in [facility] ED 2 weeks ago for similar complaints and medications were adjusted at the time. (23 Sep 2020 10:52) Cardiology consulted and is now s/p Angiosculpt and PEPE x 2 to mid-distal LAD (ISR) via RFA closed with angioseal device by Dr. Escobar yesterday (Synergy 2.25 x 38 mm; Synergy 2.5 x 20 mm)  Overnight patient with 21 beats of VT that woke him out of sleep.  Patient remained awake and stable.  Serum potassium 3.7 and magnesium 1.8 which were supplemented.      PAST MEDICAL & SURGICAL HISTORY:  Status post CVA  residual rt. lower ext weakness.    Former smoker    Heart block  2:1 HB   dual chamber AICD (DDD ) in May 2018 [facility] (Dr. Crook, [place])    CAD (coronary artery disease)    Stented coronary artery    Thrombosis  apical    MI (myocardial infarction)  2 coronary stents    HTN (hypertension)    Cardiac defibrillator in place  2017  Medtronic Visa AF MRI VR Surescan ICD   Model# TWXT4G9, SN# [id number]        PREVIOUS DIAGNOSTIC TESTING:      ECHO  FINDINGS: < from: TTE Echo Complete w/ Contrast w/ Doppler (09.02.20 @ 18:42) >  Summary:   1. Endocardial visualization was enhanced with intravenous echo contrast.   2. Left ventricular ejection fraction, by visual estimation, is 55 to 60%.   3. Normal global left ventricular systolic function.   4. Multiple left ventricular regional wall motion abnormalities exist. See wall motion findings.   5. Normal left ventricular internal cavity size.   6. There is moderate concentric left ventricular hypertrophy.   7. Normal left atrial size.   8. Normal right atrial size.   9. Mild thickening of the anterior and posterior mitral valve leaflets.  10. Trace mitral valve regurgitation.  11. Sclerotic aortic valve with normal opening.  12. There is no evidence of pericardial effusion.    MD Adrienne Electronically signed on 9/3/2020 at 9:40:24 AM    < end of copied text >    STRESS  FINDINGS: < from: Nuclear Stress Test-Exercise (Nuclear Stress Test-Exercise .) (05.15.18 @ 12:35) >  IMPRESSIONS:Abnormal Study  * Exercise capacity: 11 METS, Average for age and gender.  93% of MPHR.  * Chest Pain: No chest pain with exercise.  * HR Response: Appropriate.  * BP Response: Appropriate.  * Heart Rhythm: Sinus Rhythm - 85 BPM.  * ECG Abnormalities: There were no diagnostic changes.  * Arrhythmia: occasional PVCs in the early exercise period  and recvoery period.  * There is a medium sized, mild to moderate defect in  inferior wall that is predominantly fixed, suggestive of  infarction with minimal austin-infarct ischemia.There is a  medium sized, severe defect in apical walls that is  predominantly fixed, suggestive of infarction with minimal  austin-infarct ischemia.  *  Apical walls akinetic. Inferior wall akinetic. Post  stress LVEF 36%.    Conclusion:  There is a medium sized, mild to moderate defect in  inferior wall that is predominantly fixed, suggestive of  infarction with minimal austin-infarct ischemia.There is a  medium sized, severe defect in apical walls that is  predominantly fixed, suggestive of infarction with minimal  austin-infarct ischemia.    < end of copied text >    CATHETERIZATION  FINDINGS: < from: Cardiac Cath Lab - Adult (09.04.20 @ 07:43) >  CORONARY VESSELS: Thecoronary circulation is right dominant.  LM:   --  LM: Normal.  LAD:   --  Mid LAD: There was a 20 % stenosis. In a second lesion, there  was a diffuse 70 % stenosis in-stent.  --  Distal LAD: There was a diffuse 70 % stenosis in-stent. In a second  lesion, there was a 80 % stenosis.  CX:   --  Mid circumflex: There was a 0 % stenosis in-stent.  RCA:   --  Mid RCA: There was a 20 % stenosis in-stent. Superior takeoff.  COMPLICATIONS: No complications occurred during the cath lab visit.  DIAGNOSTIC IMPRESSIONS: Patent RCA and circumflex stents.  Diffuse mid to distal LAD instent restenosis in a 2.25/2.5 mm stents.  DIAGNOSTIC RECOMMENDATIONS: Considering small caliber vessel, diffuse  nature of disease, patient needs to be optimized on antianginal therapy.  If fails, will consider laser atherectomy/PCI to LAD. Would require  40-50mm of small diameter stents to address diffuse disease.  Prepared and signed by  Greg Escobar MD  Signed 09/04/2020 14:59:37        Allergies    No Known Allergies    Intolerances        MEDICATIONS  (STANDING):  aspirin enteric coated 81 milliGRAM(s) Oral daily  atorvastatin 80 milliGRAM(s) Oral at bedtime  carvedilol 12.5 milliGRAM(s) Oral two times a day  clopidogrel Tablet 75 milliGRAM(s) Oral daily  isosorbide   mononitrate ER Tablet (IMDUR) 60 milliGRAM(s) Oral daily  lisinopril 20 milliGRAM(s) Oral daily  potassium chloride    Tablet ER 20 milliEquivalent(s) Oral every 2 hours  ranolazine 500 milliGRAM(s) Oral two times a day  spironolactone 25 milliGRAM(s) Oral daily    MEDICATIONS  (PRN):  morphine  - Injectable 2 milliGRAM(s) IV Push once PRN Severe Pain (7 - 10)  nitroglycerin     SubLingual 0.4 milliGRAM(s) SubLingual every 5 minutes PRN Chest Pain      REVIEW OF SYSTEMS:  CONSTITUTIONAL: No fever, weight loss, or fatigue  EYES: No eye pain, visual disturbances, or discharge  ENMT:  No difficulty hearing, tinnitus, vertigo; No sinus or throat pain  NECK: No pain or stiffness  RESPIRATORY: No cough, wheezing, chills or hemoptysis; No Shortness of Breath  CARDIOVASCULAR: No chest pain, palpitations, passing out, dizziness, or leg swelling  GASTROINTESTINAL: No abdominal or epigastric pain. No nausea, vomiting, or hematemesis; No diarrhea or constipation. No melena or hematochezia.  GENITOURINARY: No dysuria, frequency, hematuria, or incontinence  NEUROLOGICAL: No headaches, memory loss, loss of strength, numbness, or tremors  SKIN: No itching, burning, rashes, or lesions   LYMPH Nodes: No enlarged glands  ENDOCRINE: No heat or cold intolerance; No hair loss  MUSCULOSKELETAL: No joint pain or swelling; No muscle, back, or extremity pain  PSYCHIATRIC: No depression, anxiety, mood swings, or difficulty sleeping  HEME/LYMPH: No easy bruising, or bleeding gums  ALLERY AND IMMUNOLOGIC: No hives or eczema	    Vital Signs Last 24 Hrs  T(C): 36.7 (23 Sep 2020 15:33), Max: 37 (23 Sep 2020 09:02)  T(F): 98 (23 Sep 2020 15:33), Max: 98.6 (23 Sep 2020 09:02)  HR: 74 (24 Sep 2020 08:01) (65 - 81)  BP: 112/79 (24 Sep 2020 08:01) (112/79 - 155/91)  BP(mean): --  RR: 16 (24 Sep 2020 08:01) (16 - 20)  SpO2: 97% (23 Sep 2020 21:00) (96% - 99%)    Daily Height in cm: 177.8 (23 Sep 2020 09:02)    Daily     I&O's Detail    23 Sep 2020 07:01  -  24 Sep 2020 07:00  --------------------------------------------------------  IN:    Oral Fluid: 360 mL  Total IN: 360 mL    OUT:  Total OUT: 0 mL    Total NET: 360 mL          PHYSICAL EXAM:  Appearance: Normal, well nourished	  HEENT:   Normal oral mucosa, PERRL, EOMI, sclera non-icteric	  Lymphatic: No cervical lymphadenopathy  Cardiovascular: Normal S1 S2, No JVD, No cardiac murmurs, No carotid bruits, No peripheral edema  Respiratory: Lungs clear to auscultation	  Psychiatry: A & O x 3, Mood & affect appropriate  Gastrointestinal:  Soft, Non-tender, + BS, no bruits	  Skin: No rashes, No ecchymoses, No cyanosis  Neurologic: Grossly non-focal with full strength in all four extremities  Extremities: Normal range of motion, No clubbing, cyanosis or edema  Vascular: Peripheral pulses palpable 2+ bilaterally  Procedure site: RFA angioseal site benign without hematoma/bleeding; no bruit; + right PP      INTERPRETATION OF TELEMETRY: NSR 1st degree AV block 60s with 21 beats VT     ECG: NSR 1st degree AV block 69 bpm inferior and anterolateral Q waves; without ST elevations/depressions    LABS:                        15.8   6.43  )-----------( 156      ( 24 Sep 2020 03:17 )             47.2     09-24    141  |  102  |  17.0  ----------------------------<  94  3.7   |  26.0  |  0.90    Ca    9.0      24 Sep 2020 03:17  Mg     1.8     09-24    TPro  7.0  /  Alb  4.2  /  TBili  0.8  /  DBili  x   /  AST  24  /  ALT  22  /  AlkPhos  78  09-23    CARDIAC MARKERS ( 23 Sep 2020 10:13 )  x     / <0.01 ng/mL / x     / x     / x          PT/INR - ( 23 Sep 2020 11:52 )   PT: 13.7 sec;   INR: 1.19 ratio             I&O's Summary    23 Sep 2020 07:01  -  24 Sep 2020 07:00  --------------------------------------------------------  IN: 360 mL / OUT: 0 mL / NET: 360 mL      BNPSerum Pro-Brain Natriuretic Peptide: 76 pg/mL (09-23 @ 10:13)    RADIOLOGY & ADDITIONAL STUDIES:

## 2020-09-29 ENCOUNTER — APPOINTMENT (OUTPATIENT)
Dept: CARDIOLOGY | Facility: CLINIC | Age: 50
End: 2020-09-29
Payer: MEDICAID

## 2020-09-29 ENCOUNTER — NON-APPOINTMENT (OUTPATIENT)
Age: 50
End: 2020-09-29

## 2020-09-29 VITALS
OXYGEN SATURATION: 98 % | TEMPERATURE: 97.4 F | HEART RATE: 68 BPM | BODY MASS INDEX: 28.17 KG/M2 | WEIGHT: 188 LBS | HEIGHT: 68.5 IN | SYSTOLIC BLOOD PRESSURE: 104 MMHG | DIASTOLIC BLOOD PRESSURE: 65 MMHG

## 2020-09-29 DIAGNOSIS — M79.2 NEURALGIA AND NEURITIS, UNSPECIFIED: ICD-10-CM

## 2020-09-29 DIAGNOSIS — R53.1 WEAKNESS: ICD-10-CM

## 2020-09-29 DIAGNOSIS — K21.9 GASTRO-ESOPHAGEAL REFLUX DISEASE W/OUT ESOPHAGITIS: ICD-10-CM

## 2020-09-29 PROCEDURE — 93000 ELECTROCARDIOGRAM COMPLETE: CPT

## 2020-09-29 PROCEDURE — 99214 OFFICE O/P EST MOD 30 MIN: CPT | Mod: 25

## 2020-09-29 RX ORDER — GABAPENTIN 100 MG/1
100 CAPSULE ORAL
Refills: 0 | Status: DISCONTINUED | COMMUNITY
Start: 2020-09-14 | End: 2020-09-29

## 2020-09-29 RX ORDER — ROSUVASTATIN CALCIUM 40 MG/1
40 TABLET, FILM COATED ORAL
Refills: 0 | Status: DISCONTINUED | COMMUNITY
Start: 2020-09-14 | End: 2020-09-29

## 2020-09-29 NOTE — REASON FOR VISIT
[Follow-Up - From Hospitalization] : follow-up of a recent hospitalization for [Cardiomyopathy] : cardiomyopathy [Coronary Artery Disease] : coronary artery disease [Source: ______] : History obtained from [unfilled]

## 2020-09-29 NOTE — PHYSICAL EXAM
[General Appearance - Well Developed] : well developed [General Appearance - Well Nourished] : well nourished [Normal Conjunctiva] : the conjunctiva exhibited no abnormalities [] : no respiratory distress [Respiration, Rhythm And Depth] : normal respiratory rhythm and effort [Heart Rate And Rhythm] : heart rate and rhythm were normal [Heart Sounds] : normal S1 and S2 [Murmurs] : no murmurs present [Edema] : no peripheral edema present [FreeTextEntry1] : R-radial +2, R-femoral small nodular swelling, no hematoma [Bowel Sounds] : normal bowel sounds [Abdomen Soft] : soft [Abnormal Walk] : normal gait [Nail Clubbing] : no clubbing of the fingernails [Cyanosis, Localized] : no localized cyanosis [Skin Color & Pigmentation] : normal skin color and pigmentation [Oriented To Time, Place, And Person] : oriented to person, place, and time [Affect] : the affect was normal

## 2020-09-29 NOTE — HISTORY OF PRESENT ILLNESS
[FreeTextEntry1] : 50M Creole-speaking man with history of CAD with multivessels stentings and MI (2017 at [facility] with restenosis of RCA and LAD s/p PEPE to mRCA), LV apical thrombus (been on warfarin since ), HFrEF (35-40% in 2018), syncope with 2:1 heart block s/p Medtronic AICD 2018 (DDD ), CVA (2019) and with unstable angina in 2019 s/p PCI/stent to mLCx at [facility], previously follows at [facility] with Dr. Crook but since left practice without follow up, presented to [facility]-ED on 20 with unstable angina, serial Troponin negative, repeaT TTE with EF 40-45% (upon my own review) and resolved LV apical thrombus (known since 2019 but unclear reason remains on warfarin and self discontinued clopidogrel 75mg since 2019), underwent LHC found with patent stents in LCx, RCA and moderate-severe instent restenosis of [id number]-[id number] LAD, given small caliber size of the vessel decided medical management with antianginal (increased Imdur to 120mg) for now, presented for initial outpatient cardiology evaluation on 9/15/20, added Ranexa to Imdur for severe angina, still with refractory symptoms and he presented again to [facility]-ED on 20, underwent PCI/PEPE x2 to dLAD for 95% severe instent restenosis, repeat TTE off warfarin found with apical akinesis/aneurysm with calcified mural thrombus, LV EF 40-45%, had 21 beats WCT, increased carvedilol dose to 25mg BID, discharged home on 20, presents for follow up.  \par \par He reports still having chest pain daily and sometime can last throughout the day, has not tried sublingual nitro. Sometimes feel as fire sensation, doesn't take any PPI/antacids. He's planning to return to [facility] for 4-5 months to resume PT for his R-leg from prior stroke. \par \par Prior visit 9/15/20:\par Patient reports SBP 80-90s with dizziness at times when he uses Imdur 120mg, continues to have L-sided chest pain but mainly at night and in the morning, at times also with ambulation, typically last for about 30 minutes. He remains compliant with ASA/clopidogrel, off warfarin. He is planning ot return to [facility] in about 1-2 month as his visa is about to be . \par \par \par Lipid panel:\par TG 58, , HDL 39, LDL 54

## 2020-09-29 NOTE — DISCUSSION/SUMMARY
[FreeTextEntry1] : 50M Creole-speaking man with history of CAD with multivessels stentings and MI (2017 at [facility] with restenosis of RCA and LAD s/p PEPE to mRCA), LV apical thrombus (been on warfarin since 2017), HFrEF (35-40% in 2018), syncope with 2:1 heart block s/p Medtronic AICD 5/2018 (DDD ), CVA (6/2019) and with unstable angina in 11/2019 s/p PCI/stent to mLCx at [facility], previously follows at [facility] with Dr. Crook but since left practice without follow up, presented to [facility]ED on 9/2/20 with unstable angina, serial Troponin negative, repeaT TTE with EF 40-45% (upon my own review) and resolved LV apical thrombus (known since 11/2019 but unclear reason remains on warfarin and self discontinued clopidogrel 75mg since 7/2019), underwent LHC found with patent stents in LCx, RCA and moderate-severe instent restenosis of [id number]-[id number]-[id number] LAD, given small caliber size of the vessel decided medical management with antianginal (increased Imdur to 120mg) for now, presented for initial outpatient cardiology evaluation on 9/15/20, added Ranexa to Imdur for severe angina, still with refractory symptoms and he presented again to [facility]ED on 9/23/20, underwent PCI/PEPE x2 to dLAD for 95% severe instent restenosis, repeat TTE off warfarin found with apical akinesis/aneurysm with calcified mural thrombus, LV EF 40-45%, had 21 beats WCT, increased carvedilol dose to 25mg BID, discharged home on 9/24/20, presents for follow up.  \par \par Recurrent angina despite recent PCI/PEPE, continue current antianginals and add omeprazole for suspected GERD as well. \par \par 1. CAD with multiple stents and instent-restenosis of dLAD s/p PEPE x2 9/2020- continue DAPT indefinitely- continue carvedilol and Imdur 60mg and Ranexa 500mg BID, advised to use sublingual nitrate PRN, Continue rosuvastatin. \par \par 2. Ischemic cardiomyopathy, HFrEF- ACC/AHA stage B- continue carvedilol 25mg BID and reduce lisinopril to 5mg due to borderline low BP, continue spironolactone 25mg. \par \par 3. History of LV apical thrombus- off warfarin x2 weeks, repeat TTE with small calcified apical mural thrombus, given calcified thrombus low risk for thromboemoblism and required to be on indefinite DAPT\par \par 4. History of 2:1 heart block, recent NSVT- AICD interrogated during recent hospitalization; continue carvedilol, EPS follow up in 6 months for reinterrogation. \par \par 5. H/o CVA, R-sided weakness- on gabapentin for neuropathic pain; if recurrent CVA would likely need life-long triple therapy as well given prior LV apical thrombus. \par \par 6. GERD- add omeprazole 40mg daily. \par \par Will need to plan for his procedure if returning to [facility] in 1-2 months. \par \par \par Follow up once he returns from [facility]. \par

## 2020-10-29 PROCEDURE — 86769 SARS-COV-2 COVID-19 ANTIBODY: CPT

## 2020-10-29 PROCEDURE — 71045 X-RAY EXAM CHEST 1 VIEW: CPT

## 2020-10-29 PROCEDURE — 84484 ASSAY OF TROPONIN QUANT: CPT

## 2020-10-29 PROCEDURE — 86900 BLOOD TYPING SEROLOGIC ABO: CPT

## 2020-10-29 PROCEDURE — 92978 ENDOLUMINL IVUS OCT C 1ST: CPT | Mod: LD

## 2020-10-29 PROCEDURE — 83735 ASSAY OF MAGNESIUM: CPT

## 2020-10-29 PROCEDURE — C1760: CPT

## 2020-10-29 PROCEDURE — 99153 MOD SED SAME PHYS/QHP EA: CPT

## 2020-10-29 PROCEDURE — 93005 ELECTROCARDIOGRAM TRACING: CPT

## 2020-10-29 PROCEDURE — 86901 BLOOD TYPING SEROLOGIC RH(D): CPT

## 2020-10-29 PROCEDURE — C9600: CPT | Mod: LD

## 2020-10-29 PROCEDURE — 85610 PROTHROMBIN TIME: CPT

## 2020-10-29 PROCEDURE — 93308 TTE F-UP OR LMTD: CPT

## 2020-10-29 PROCEDURE — U0003: CPT

## 2020-10-29 PROCEDURE — 99285 EMERGENCY DEPT VISIT HI MDM: CPT

## 2020-10-29 PROCEDURE — 80048 BASIC METABOLIC PNL TOTAL CA: CPT

## 2020-10-29 PROCEDURE — 99152 MOD SED SAME PHYS/QHP 5/>YRS: CPT

## 2020-10-29 PROCEDURE — 80053 COMPREHEN METABOLIC PANEL: CPT

## 2020-10-29 PROCEDURE — 36415 COLL VENOUS BLD VENIPUNCTURE: CPT

## 2020-10-29 PROCEDURE — C1753: CPT

## 2020-10-29 PROCEDURE — C1874: CPT

## 2020-10-29 PROCEDURE — 86850 RBC ANTIBODY SCREEN: CPT

## 2020-10-29 PROCEDURE — 85025 COMPLETE CBC W/AUTO DIFF WBC: CPT

## 2020-10-29 PROCEDURE — C1894: CPT

## 2020-10-29 PROCEDURE — 93454 CORONARY ARTERY ANGIO S&I: CPT | Mod: 59

## 2020-10-29 PROCEDURE — C1769: CPT

## 2020-10-29 PROCEDURE — C1725: CPT

## 2020-10-29 PROCEDURE — 83880 ASSAY OF NATRIURETIC PEPTIDE: CPT

## 2020-10-29 PROCEDURE — C1887: CPT

## 2021-04-02 ENCOUNTER — RX RENEWAL (OUTPATIENT)
Age: 51
End: 2021-04-02

## 2021-04-07 ENCOUNTER — RX RENEWAL (OUTPATIENT)
Age: 51
End: 2021-04-07

## 2021-05-18 ENCOUNTER — APPOINTMENT (OUTPATIENT)
Dept: CARDIOLOGY | Facility: CLINIC | Age: 51
End: 2021-05-18
Payer: SELF-PAY

## 2021-05-18 VITALS
TEMPERATURE: 98.4 F | SYSTOLIC BLOOD PRESSURE: 142 MMHG | BODY MASS INDEX: 28.32 KG/M2 | DIASTOLIC BLOOD PRESSURE: 68 MMHG | HEART RATE: 73 BPM | OXYGEN SATURATION: 98 % | HEIGHT: 68.5 IN | WEIGHT: 189 LBS

## 2021-05-18 PROCEDURE — 99212 OFFICE O/P EST SF 10 MIN: CPT

## 2021-05-18 NOTE — DISCUSSION/SUMMARY
[FreeTextEntry1] : 50M Creole-speaking man with history of CAD with multivessels stentings and MI (2017 at [facility] with restenosis of RCA and LAD s/p PEPE to mRCA), LV apical thrombus (previously been on warfarin since 2017 discontinued on 2020), HFrEF (35-40% in 2018), syncope with 2:1 heart block s/p Medtronic AICD 5/2018 (DDD ), CVA (6/2019) and with unstable angina in 11/2019 s/p PCI/stent to mLCx at [facility], previously follows at [facility] with Dr. Crook but since left practice without follow up, presented to [facility]-ED on 9/2/20 with unstable angina, serial Troponin negative, repeaT TTE with EF 40-45% (upon my own review) and resolved LV apical thrombus (known since 11/2019 but unclear reason remains on warfarin and self discontinued clopidogrel 75mg since 7/2019), underwent LHC found with patent stents in LCx, RCA and moderate-severe instent restenosis of [id number] LAD, given small caliber size of the vessel decided medical management with antianginal (increased Imdur to 120mg), presented for initial outpatient cardiology evaluation on 9/15/20, added Ranexa to Imdur for severe angina, still with refractory symptoms and he presented again to [facility]-ED on 9/23/20, underwent PCI/PEPE x2 to dLAD for 95% severe instent restenosis, repeat TTE off warfarin found with apical akinesis/aneurysm with calcified mural thrombus, LV EF 40-45%, had 21 beats WCT, increased carvedilol dose to 25mg BID, discharged home on 9/24/20, last visit 9/2020, presents for follow up.  \par \par Recurrent angina despite recent PCI/PEPE, continue current antianginals and omeprazole for suspected GERD as well. Advised to take sublingual nitro PRN, will reduce carvedilol 12.5mg BID given reports of dizziness/borderline SBP 90-100s at time. AICD interrogated today no VT/VF event and with >8 years battery life. \par \par 1. CAD with multiple stents and instent-restenosis of dLAD s/p PEPE x2 9/2020- continue DAPT indefinitely- reduce carvedilol to 12.5mg BID and continue Imdur 60mg and Ranexa 500mg BID, advised to use sublingual nitrate PRN, Continue rosuvastatin. \par \par 2. Ischemic cardiomyopathy, HFrEF- ACC/AHA stage B- continue carvedilol 12.5g BID and lisinopril to 5mg due to borderline low BP, continue spironolactone 25mg. \par \par 3. History of LV apical thrombus- off warfarin since 2020, repeat TTE with small calcified apical mural thrombus, given calcified thrombus low risk for thromboemoblism but required to be on indefinite DAPT\par \par 4. History of 2:1 heart block, prior NSVT- AICD interrogated during hospitalization; continue carvedilol, EPS follow up. \par \par 5. H/o CVA, R-sided weakness- on gabapentin for neuropathic pain; if recurrent CVA would likely need life-long triple therapy as well given prior LV apical thrombus. \par \par 6. GERD- continue omeprazole 40mg daily. \par \par \par \par Follow up in 6 months once he returns from [facility]. \par

## 2021-05-18 NOTE — HISTORY OF PRESENT ILLNESS
[FreeTextEntry1] : 50M Creole-speaking man with history of CAD with multivessels stentings and MI (2017 at [facility] with restenosis of RCA and LAD s/p PEPE to mRCA), LV apical thrombus (previously been on warfarin since  discontinued on ), HFrEF (35-40% in ), syncope with 2:1 heart block s/p Medtronic AICD 2018 (DDD ), CVA (2019) and with unstable angina in 2019 s/p PCI/stent to mLCx at [facility], previously follows at [facility] with Dr. Crook but since left practice without follow up, presented to [facility]ED on 20 with unstable angina, serial Troponin negative, repeaT TTE with EF 40-45% (upon my own review) and resolved LV apical thrombus (known since 2019 but unclear reason remains on warfarin and self discontinued clopidogrel 75mg since 2019), underwent LHC found with patent stents in LCx, RCA and moderate-severe instent restenosis of [id number]-orzxcg LAD, given small caliber size of the vessel decided medical management with antianginal (increased Imdur to 120mg), presented for initial outpatient cardiology evaluation on 9/15/20, added Ranexa to Imdur for severe angina, still with refractory symptoms and he presented again to [facility]ED on 20, underwent PCI/PEPE x2 to dLAD for 95% severe instent restenosis, repeat TTE off warfarin found with apical akinesis/aneurysm with calcified mural thrombus, LV EF 40-45%, had 21 beats WCT, increased carvedilol dose to 25mg BID, discharged home on 20, last visit 2020, presents for follow up.  \par \par Patient presents with is sister-in-law requesting her to interpret. \par \par He left for [facility] since 10/2020 and just returned this week, still with intermittent chronic angina, had one episode yesterday lasted for over 1 hour, does not take sublingual nitro as reports cause his BP to be high, but when take his meds would see SBP 90-100s and would take Imdur 30 BID instead, self paying for all his medications. Having intermittent palpitations lasting few seconds and lasted for few hours, denies ICD shock. Currently with his travel visa expiring in July. \par \par Has not yet schedule for the COVID vaccine due to concern from his wife. \par \par \par Prior visit 2020:\par He reports still having chest pain daily and sometime can last throughout the day, has not tried sublingual nitro. Sometimes feel as fire sensation, doesn't take any PPI/antacids. He's planning to return to [facility] for 4-5 months to resume PT for his R-leg from prior stroke. \par \par Prior visit 9/15/20:\par Patient reports SBP 80-90s with dizziness at times when he uses Imdur 120mg, continues to have L-sided chest pain but mainly at night and in the morning, at times also with ambulation, typically last for about 30 minutes. He remains compliant with ASA/clopidogrel, off warfarin. He is planning ot return to [facility] in about 1-2 month as his visa is about to be . \par \par \par Lipid panel:\par TG 58, , HDL 39, LDL 54

## 2021-05-18 NOTE — REVIEW OF SYSTEMS
[Dyspnea on exertion] : not dyspnea during exertion [Chest Discomfort] : chest discomfort [Palpitations] : palpitations [Joint Pain] : joint pain [Muscle Cramps] : muscle cramps [Numbness (Hypoesthesia)] : numbness [Tingling (Paresthesia)] : tingling [Negative] : Heme/Lymph

## 2021-07-11 NOTE — REVIEW OF SYSTEMS
Problem: Falls - Risk of:  Goal: Will remain free from falls  Description: Will remain free from falls  7/11/2021 1303 by Kay Pallas, RN  Outcome: Ongoing     Problem: Falls - Risk of:  Goal: Absence of physical injury  Description: Absence of physical injury  Outcome: Ongoing     Problem: Skin Integrity:  Goal: Will show no infection signs and symptoms  Description: Will show no infection signs and symptoms  Outcome: Ongoing     Problem: Skin Integrity:  Goal: Absence of new skin breakdown  Description: Absence of new skin breakdown  7/11/2021 1303 by Kay Pallas, RN  Outcome: Ongoing     Problem: Pain:  Goal: Pain level will decrease  Description: Pain level will decrease  7/11/2021 1303 by Kay Pallas, RN  Outcome: Ongoing     Problem: Pain:  Goal: Control of acute pain  Description: Control of acute pain  Outcome: Ongoing     Problem: Pain:  Goal: Control of chronic pain  Description: Control of chronic pain  Outcome: Ongoing     Problem: Nutrition Deficit:  Goal: Ability to achieve adequate nutritional intake will improve  Description: Ability to achieve adequate nutritional intake will improve  Outcome: Ongoing     Problem: Nutrition  Goal: Optimal nutrition therapy  Outcome: Ongoing [Fever] : no fever [Chills] : no chills [Blurry Vision] : no blurred vision [Earache] : no earache [Shortness Of Breath] : no shortness of breath [Dyspnea on exertion] : not dyspnea during exertion [Chest Pain] : chest pain [Cough] : no cough [Abdominal Pain] : no abdominal pain [Urinary Frequency] : no change in urinary frequency [Joint Pain] : no joint pain [Skin: A Rash] : no rash: [Dizziness] : dizziness [Confusion] : no confusion was observed [Excessive Thirst] : no polydipsia [Easy Bleeding] : no tendency for easy bleeding

## 2021-10-24 NOTE — DISCUSSION/SUMMARY
[FreeTextEntry1] : 50M Creole-speaking man with history of CAD with multivessels stentings and MI (2017 at [facility] with restenosis of RCA and LAD s/p PEPE to mRCA), LV apical thrombus (previously been on warfarin since 2017 discontinued on 2020), HFrEF (35-40% in 2018), syncope with 2:1 heart block s/p Medtronic AICD 5/2018 (DDD ), CVA (6/2019) and with unstable angina in 11/2019 s/p PCI/stent to mLCx at [facility], previously follows at [facility] with Dr. Crook but since left practice without follow up, presented to [facility] on 9/2/20 with unstable angina, serial Troponin negative, repeaT TTE with EF 40-45% (upon my own review) and resolved LV apical thrombus (known since 11/2019 but unclear reason remains on warfarin and self discontinued clopidogrel 75mg since 7/2019), underwent LHC found with patent stents in LCx, RCA and moderate-severe instent restenosis of [id number]-ra-[id number] LAD, given small caliber size of the vessel decided medical management with antianginal (increased Imdur to 120mg), presented for initial outpatient cardiology evaluation on 9/15/20, added Ranexa to Imdur for severe angina, still with refractory symptoms and he presented again to [facility] on 9/23/20, underwent PCI/PEPE x2 to dLAD for 95% severe instent restenosis, repeat TTE off warfarin found with apical akinesis/aneurysm with calcified mural thrombus, LV EF 40-45%, had 21 beats WCT, increased carvedilol dose to 25mg BID, discharged home on 9/24/20, last visit 9/2020, presents for follow up.  \par \par Recurrent angina despite recent PCI/PEPE, continue current antianginals and omeprazole for suspected GERD as well. Advised to take sublingual nitro PRN, will reduce carvedilol 12.5mg BID given reports of dizziness/borderline SBP 90-100s at time. AICD interrogated today no VT/VF event and with >8 years battery life. \par \par 1. CAD with multiple stents and instent-restenosis of dLAD s/p PEPE x2 9/2020- continue DAPT indefinitely- reduce carvedilol to 12.5mg BID and continue Imdur 60mg and Ranexa 500mg BID, advised to use sublingual nitrate PRN, Continue rosuvastatin. \par \par 2. Ischemic cardiomyopathy, HFrEF- ACC/AHA stage B- continue carvedilol 12.5g BID and lisinopril to 5mg due to borderline low BP, continue spironolactone 25mg. \par \par 3. History of LV apical thrombus- off warfarin since 2020, repeat TTE with small calcified apical mural thrombus, given calcified thrombus low risk for thromboemoblism but required to be on indefinite DAPT\par \par 4. History of 2:1 heart block, prior NSVT- AICD interrogated during hospitalization; continue carvedilol, EPS follow up. \par \par 5. H/o CVA, R-sided weakness- on gabapentin for neuropathic pain; if recurrent CVA would likely need life-long triple therapy as well given prior LV apical thrombus. \par \par 6. GERD- continue omeprazole 40mg daily. \par \par \par \par Follow up in 6 months once he returns from [facility]. \par

## 2021-10-24 NOTE — HISTORY OF PRESENT ILLNESS
[FreeTextEntry1] : 51M Creole-speaking man with history of CAD with multivessels stentings and MI (2017 at [facility] with restenosis of RCA and LAD s/p PEPE to mRCA), LV apical thrombus (previously been on warfarin since  discontinued on ), HFrEF (35-40% in ), syncope with 2:1 heart block s/p Medtronic AICD 2018 (DDD ), CVA (2019) and with unstable angina in 2019 s/p PCI/stent to mLCx at [facility], previously follows at [facility] with Dr. Crook but since left practice without follow up, presented to [facility] on 20 with unstable angina, serial Troponin negative, repeaT TTE with EF 40-45% (upon my own review) and resolved LV apical thrombus (known since 2019 but unclear reason remains on warfarin and self discontinued clopidogrel 75mg since 2019), underwent LHC found with patent stents in LCx, RCA and moderate-severe instent restenosis of [id number]-bn-[id number] LAD, given small caliber size of the vessel decided medical management with antianginal (increased Imdur to 120mg), presented for initial outpatient cardiology evaluation on 9/15/20, added Ranexa to Imdur for severe angina, still with refractory symptoms and he presented again to [facility] on 20, underwent PCI/PEPE x2 to dLAD for 95% severe instent restenosis, repeat TTE off warfarin found with apical akinesis/aneurysm with calcified mural thrombus, LV EF 40-45%, had 21 beats WCT, increased carvedilol dose to 25mg BID, discharged home on 20, last visit 2021 after returned back from [facility] with borderline SBP redcued carvedilol dose to 12.5mg, presents for follow up.  \par \par \par Prior visit 2021\par Patient presents with is sister-in-law requesting her to interpret. \par \par He left for [facility] since 10/2020 and just returned this week, still with intermittent chronic angina, had one episode yesterday lasted for over 1 hour, does not take sublingual nitro as reports cause his BP to be high, but when take his meds would see SBP 90-100s and would take Imdur 30 BID instead, self paying for all his medications. Having intermittent palpitations lasting few seconds and lasted for few hours, denies ICD shock. Currently with his travel visa expiring in July. \par \par Has not yet schedule for the COVID vaccine due to concern from his wife. \par \par \par Prior visit 2020:\par He reports still having chest pain daily and sometime can last throughout the day, has not tried sublingual nitro. Sometimes feel as fire sensation, doesn't take any PPI/antacids. He's planning to return to [facility] for 4-5 months to resume PT for his R-leg from prior stroke. \par \par Prior visit 9/15/20:\par Patient reports SBP 80-90s with dizziness at times when he uses Imdur 120mg, continues to have L-sided chest pain but mainly at night and in the morning, at times also with ambulation, typically last for about 30 minutes. He remains compliant with ASA/clopidogrel, off warfarin. He is planning ot return to [facility] in about 1-2 month as his visa is about to be . \par \par \par Lipid panel:\par TG 58, , HDL 39, LDL 54

## 2021-10-25 ENCOUNTER — APPOINTMENT (OUTPATIENT)
Dept: CARDIOLOGY | Facility: CLINIC | Age: 51
End: 2021-10-25

## 2021-10-29 ENCOUNTER — RX RENEWAL (OUTPATIENT)
Age: 51
End: 2021-10-29

## 2021-11-15 ENCOUNTER — NON-APPOINTMENT (OUTPATIENT)
Age: 51
End: 2021-11-15

## 2021-11-15 ENCOUNTER — APPOINTMENT (OUTPATIENT)
Dept: CARDIOLOGY | Facility: CLINIC | Age: 51
End: 2021-11-15
Payer: MEDICAID

## 2021-11-15 VITALS
WEIGHT: 187 LBS | TEMPERATURE: 98.9 F | OXYGEN SATURATION: 95 % | HEART RATE: 75 BPM | HEIGHT: 68.5 IN | DIASTOLIC BLOOD PRESSURE: 76 MMHG | BODY MASS INDEX: 28.02 KG/M2 | SYSTOLIC BLOOD PRESSURE: 120 MMHG

## 2021-11-15 DIAGNOSIS — I10 ESSENTIAL (PRIMARY) HYPERTENSION: ICD-10-CM

## 2021-11-15 PROCEDURE — 93000 ELECTROCARDIOGRAM COMPLETE: CPT

## 2021-11-15 PROCEDURE — 99214 OFFICE O/P EST MOD 30 MIN: CPT

## 2021-11-15 RX ORDER — OMEPRAZOLE 40 MG/1
40 CAPSULE, DELAYED RELEASE ORAL
Qty: 180 | Refills: 1 | Status: DISCONTINUED | COMMUNITY
Start: 2020-09-29 | End: 2021-11-15

## 2021-11-15 NOTE — HISTORY OF PRESENT ILLNESS
[FreeTextEntry1] : 51M Creole-speaking man with history of CAD with multivessels stentings and MI (2017 at [facility] with restenosis of RCA and LAD s/p PEPE to mRCA), LV apical thrombus (previously been on warfarin since  discontinued on ), HFrEF (35-40% in ), syncope with 2:1 heart block s/p Medtronic AICD 2018 (DDD ), CVA (2019) and with unstable angina in 2019 s/p PCI/stent to mLCx at [facility], previously follows at [facility] with Dr. Crook but since left practice without follow up, presented to [facility] on 20 with unstable angina, serial Troponin negative, repeaT TTE with EF 40-45% (upon my own review) and resolved LV apical thrombus (known since 2019 but unclear reason remains on warfarin and self discontinued clopidogrel 75mg since 2019), underwent LHC found with patent stents in LCx, RCA and moderate-severe instent restenosis of [id number]-vd-[id number] LAD, given small caliber size of the vessel decided medical management with antianginal (increased Imdur to 120mg), presented for initial outpatient cardiology evaluation on 9/15/20, added Ranexa to Imdur for severe angina, still with refractory symptoms and he presented again to [facility] on 20, underwent PCI/PEPE x2 to dLAD for 95% severe instent restenosis, repeat TTE off warfarin found with apical akinesis/aneurysm with calcified mural thrombus, LV EF 40-45%, had 21 beats WCT, increased carvedilol dose to 25mg BID, discharged home on 20, last visit 2021 after returned back from [facility] with borderline SBP redcued carvedilol dose to 12.5mg, presents for follow up.  \par \par He's been living in [place] with his significant other, had been hospitalized twice there, 1st episode episode in 2021 for chest pain had Echo and nuclear stress test done showed no ischemia, 2nd admission 10/2021 for acute stroke symptoms (facial droop and L-sided weakness for few hours), symptoms completely resolved once in the ER, noted to be hypertensive 184/104, did not undergo MRI brain cause he was claustrophobic but repeat CT head done no new infarct, LDL 89 increased atorvastatin to 80mg. \par \par Been feeling well since returning back to [place], still has chronic angina at rest, able to walk for 30 minutes, still with nonspecific paresthesia sensation of hotness in his body and feeling dizzy after his medications use. \par \par Remains hesitant about COVID vaccine. \par \par Prior visit 2021\par Patient presents with is sister-in-law requesting her to interpret. \par \par He left for [facility] since 10/2020 and just returned this week, still with intermittent chronic angina, had one episode yesterday lasted for over 1 hour, does not take sublingual nitro as reports cause his BP to be high, but when take his meds would see SBP 90-100s and would take Imdur 30 BID instead, self paying for all his medications. Having intermittent palpitations lasting few seconds and lasted for few hours, denies ICD shock. Currently with his travel visa expiring in July. \par \par Has not yet schedule for the COVID vaccine due to concern from his wife. \par \par \par Prior visit 2020:\par He reports still having chest pain daily and sometime can last throughout the day, has not tried sublingual nitro. Sometimes feel as fire sensation, doesn't take any PPI/antacids. He's planning to return to [facility] for 4-5 months to resume PT for his R-leg from prior stroke. \par \par Prior visit 9/15/20:\par Patient reports SBP 80-90s with dizziness at times when he uses Imdur 120mg, continues to have L-sided chest pain but mainly at night and in the morning, at times also with ambulation, typically last for about 30 minutes. He remains compliant with ASA/clopidogrel, off warfarin. He is planning ot return to [facility] in about 1-2 month as his visa is about to be . \par \par \par Lipid panel:\par TG 58, , HDL 39, LDL 54

## 2021-11-15 NOTE — DISCUSSION/SUMMARY
[FreeTextEntry1] : 51M Creole-speaking man with history of CAD with multivessels stentings and MI (2017 at [facility] with restenosis of RCA and LAD s/p PEPE to mRCA), LV apical thrombus (previously been on warfarin since 2017 discontinued on 2020), HFrEF (35-40% in 2018), syncope with 2:1 heart block s/p Medtronic AICD 5/2018 (DDD ), CVA (6/2019) and with unstable angina in 11/2019 s/p PCI/stent to mLCx at [facility], previously follows at [facility] with Dr. Crook but since left practice without follow up, presented to [facility] on 9/2/20 with unstable angina, serial Troponin negative, repeaT TTE with EF 40-45% (upon my own review) and resolved LV apical thrombus (known since 11/2019 but unclear reason remains on warfarin and self discontinued clopidogrel 75mg since 7/2019), underwent LHC found with patent stents in LCx, RCA and moderate-severe instent restenosis of [id number] LAD, given small caliber size of the vessel decided medical management with antianginal (increased Imdur to 120mg), presented for initial outpatient cardiology evaluation on 9/15/20, added Ranexa to Imdur for severe angina, still with refractory symptoms and he presented again to [facility] on 9/23/20, underwent PCI/PEPE x2 to dLAD for 95% severe instent restenosis, repeat TTE off warfarin found with apical akinesis/aneurysm with calcified mural thrombus, LV EF 40-45%, had 21 beats WCT, increased carvedilol dose to 25mg BID, discharged home on 9/24/20, last visit 5/2021 after returned back from [facility] with borderline SBP redcued carvedilol dose to 12.5mg, presents for follow up.  \par \par Recent episode of TIA concerning given known h/o apical mural thrombus last TTE appear calcified and been off anticoagulant, will need to recheck TTE with echocontrast, may need to resume oral anticoagulant. Chronic angina with EKG unchanged. Recurrent angina despite prior PCI/PEPE, continue current antianginals increase Ranexa to 1000mg BID as reports dizziness with higher dose of Imdur use, advised to take sublingual nitro PRN, carvedilol 12.5mg BID given reports of dizziness/borderline SBP 90-100s at time. AICD interrogated today no VT/VF event and with >8 years battery life. \par \par \par 1. CAD with multiple stents and instent-restenosis of dLAD s/p PEPE x2 9/2020- continue DAPT indefinitely- reduce carvedilol to 12.5mg BID and continue Imdur 30mg and Ranexa 1000mg BID, advised to use sublingual nitrate PRN, Continue high dose atorvastatin 80mg. \par \par 2. Ischemic cardiomyopathy, HFrEF- ACC/AHA stage B- continue carvedilol 12.5g BID and lisinopril to 5mg due to borderline low BP, continue spironolactone 25mg. \par \par 3. History of LV apical thrombus- off warfarin since 2020, repeat TTE with echocontrast given recent TIA episode, need need to resume oral anticoagulant, warfarin vs. DOAC pending TTE result. \par \par 4. History of 2:1 heart block, prior NSVT- AICD interrogated during hospitalization; continue carvedilol, EPS follow up. \par \par 5. H/o CVA, R-sided weakness- on gabapentin for neuropathic pain; if recurrent CVA would likely need life-long triple therapy as well given prior LV apical thrombus. \par \par 6. GERD- off omeprazole. \par \par Strongly advised COVID and influenza vaccination. \par \par Follow up in 2 months. \par \par

## 2021-11-15 NOTE — CARDIOLOGY SUMMARY
[___] : [unfilled] [LVEF ___%] : LVEF [unfilled]% [de-identified] : 11/15/21- sinus 75, normal axis, inferior infarct, precordial ST, lateral T-wave inversion, QTc 396

## 2021-11-15 NOTE — PHYSICAL EXAM
[Well Developed] : well developed [Well Nourished] : well nourished [No Acute Distress] : no acute distress [Normal Conjunctiva] : normal conjunctiva [Normal Venous Pressure] : normal venous pressure [No Carotid Bruit] : no carotid bruit [Normal S1, S2] : normal S1, S2 [No Murmur] : no murmur [No Rub] : no rub [No Gallop] : no gallop [Good Air Entry] : good air entry [Clear Lung Fields] : clear lung fields [No Respiratory Distress] : no respiratory distress  [Soft] : abdomen soft [Non Tender] : non-tender [No Masses/organomegaly] : no masses/organomegaly [Normal Bowel Sounds] : normal bowel sounds [No Edema] : no edema [Normal Gait] : normal gait [No Cyanosis] : no cyanosis [No Clubbing] : no clubbing [No Varicosities] : no varicosities [No Rash] : no rash [No Skin Lesions] : no skin lesions [Moves all extremities] : moves all extremities [No Focal Deficits] : no focal deficits [Normal Speech] : normal speech [Alert and Oriented] : alert and oriented [Normal memory] : normal memory [de-identified] : L-chest AICD site intact

## 2021-11-22 ENCOUNTER — NON-APPOINTMENT (OUTPATIENT)
Age: 51
End: 2021-11-22

## 2021-11-22 ENCOUNTER — APPOINTMENT (OUTPATIENT)
Dept: CARDIOLOGY | Facility: CLINIC | Age: 51
End: 2021-11-22
Payer: SELF-PAY

## 2021-11-22 PROCEDURE — 93306 TTE W/DOPPLER COMPLETE: CPT

## 2021-11-22 RX ORDER — PERFLUTREN 6.52 MG/ML
6.52 INJECTION, SUSPENSION INTRAVENOUS
Qty: 2 | Refills: 0 | Status: COMPLETED | OUTPATIENT
Start: 2021-11-22

## 2021-12-01 LAB
INR PPP: 3.04 RATIO
PT BLD: 34 SEC

## 2021-12-08 LAB
INR PPP: 5.23 RATIO
PT BLD: 57 SEC

## 2021-12-08 RX ORDER — WARFARIN 4 MG/1
4 TABLET ORAL DAILY
Qty: 30 | Refills: 3 | Status: DISCONTINUED | COMMUNITY
Start: 2021-11-22 | End: 2021-12-08

## 2021-12-11 LAB
INR PPP: 1.98 RATIO
PT BLD: 22.6 SEC

## 2021-12-17 ENCOUNTER — LABORATORY RESULT (OUTPATIENT)
Age: 51
End: 2021-12-17

## 2022-01-21 ENCOUNTER — APPOINTMENT (OUTPATIENT)
Dept: CARDIOLOGY | Facility: CLINIC | Age: 52
End: 2022-01-21
Payer: SELF-PAY

## 2022-01-21 ENCOUNTER — NON-APPOINTMENT (OUTPATIENT)
Age: 52
End: 2022-01-21

## 2022-01-21 VITALS
HEIGHT: 68 IN | OXYGEN SATURATION: 98 % | TEMPERATURE: 99.4 F | RESPIRATION RATE: 18 BRPM | DIASTOLIC BLOOD PRESSURE: 78 MMHG | HEART RATE: 75 BPM | SYSTOLIC BLOOD PRESSURE: 120 MMHG | WEIGHT: 87 LBS | BODY MASS INDEX: 13.18 KG/M2

## 2022-01-21 DIAGNOSIS — Z71.85 ENCOUNTER FOR IMMUNIZATION SAFETY COUNSELING: ICD-10-CM

## 2022-01-21 PROCEDURE — 99212 OFFICE O/P EST SF 10 MIN: CPT

## 2022-01-21 PROCEDURE — 93000 ELECTROCARDIOGRAM COMPLETE: CPT

## 2022-01-21 RX ORDER — KRILL/OM-3/DHA/EPA/PHOSPHO/AST 1000-230MG
81 CAPSULE ORAL
Qty: 90 | Refills: 2 | Status: DISCONTINUED | COMMUNITY
Start: 2020-09-14 | End: 2022-01-21

## 2022-01-21 RX ORDER — GABAPENTIN 100 MG/1
100 CAPSULE ORAL
Qty: 90 | Refills: 1 | Status: DISCONTINUED | COMMUNITY
Start: 2020-09-29 | End: 2022-01-21

## 2022-01-21 NOTE — REVIEW OF SYSTEMS
Date of Consult: 12/21/20


Reason for Consult: Corona virus infection


Chief Complaint: Shortness of breath, hypotension


History of Present Illness: 


Patient is 66 years of age with a past history of hypertension admitted with low

blood pressure low back pain he was diagnosed with coronal virus infection 

admitted with shortness of breath no fever or chills no nausea vomiting and 

diarrhea is currently doing better oxygenation satisfactory white count is up no

blood cultures sent most likely these problems were due to corona virus 

infection


CT scan does not show any significant interstitial changes


Allergies





No Known Allergies Allergy (Verified 12/19/20 20:55)


   





Home Medications: 








Losartan Potassium [Cozaar] 50 mg PO DAILY 12/19/20 


hydroCHLOROthiazide [Hydrochlorothiazide*] 12.5 mg PO DAILY 12/19/20 








- Past Medical/Surgical History


Diabetic: No


-: hypertension





- Family History


  ** Father


Medical History: Cancer





  ** Mother


Medical History: Cancer





- Social History


Alcohol use: No


CD- Drugs: No


Caffeine use: Yes


Place of Residence: Home





Review of Systems


General: Weakness





Physical Examination














Temp Pulse Resp BP Pulse Ox


 


 98.4 F   78   18   117/70   93 


 


 12/21/20 08:00  12/21/20 08:00  12/21/20 08:00  12/21/20 08:00  12/21/20 08:00








General: Alert, Oriented x3


Respiratory: Clear to auscultation bilaterally


Cardiovascular: No edema, Normal S1 S2





- Problems


(1) COVID-19


Current Visit: Yes   Status: Acute   


Plan: 


Patient is 66 years of age admitted with hypotension mild hypoxemia he is doing 

much better evaluate for home O2 CT scan no evidence of corona virus infection I

 suspect patient has chronic renal failure urinalysis is negative the patient 

was neutropenic denies white count is normal recommend discharge on prednisone 

and aspirin en oxygen if needed patient has chronic renal failure CT of the 

abdomen was done no evidence of hydronephrosis plan for discharge [Chest Discomfort] : chest discomfort [Negative] : Heme/Lymph

## 2022-01-21 NOTE — DISCUSSION/SUMMARY
[FreeTextEntry1] : 51M Creole-speaking man with history of CAD with multivessel stentings and MI (2017 at [facility] with restenosis of RCA and LAD s/p PEPE to mRCA), LV apical thrombus (previously been on warfarin since 2017 discontinued on 2020), HFrEF (35-40% in 2018), syncope with 2:1 heart block s/p Medtronic AICD 5/2018 (DDD ), CVA (6/2019) and with unstable angina in 11/2019 s/p PCI/stent to mLCx at [facility], previously follows at [facility] with Dr. Crook but since left practice without follow up, presented to [facility] on 9/2/20 with unstable angina, serial Troponin negative, repeaT TTE with EF 40-45% (upon my own review) and resolved LV apical thrombus (known since 11/2019 but unclear reason remains on warfarin and self discontinued clopidogrel 75mg since 7/2019), underwent LHC found with patent stents in LCx, RCA and moderate-severe instent restenosis of hjt-cx-[id number] LAD, given small caliber size of the vessel decided medical management with antianginal (increased Imdur to 120mg), presented for initial outpatient cardiology evaluation on 9/15/20, added Ranexa to Imdur for severe angina, still with refractory symptoms and he presented again to [facility] on 9/23/20, underwent PCI/PEPE x2 to dLAD for 95% severe instent restenosis, repeat TTE off warfarin found with apical akinesis/aneurysm with calcified mural thrombus, LV EF 40-45%, had 21 beats WCT, increased carvedilol dose to 25mg BID, discharged home on 9/24/20, prior visit 5/2021 after returned back from [facility] with borderline SBP redcued carvedilol dose to 12.5mg, last visit 11/2021 interval with recurrent CVAs had repeat TTE with [facility] confirmed recurrence of LV apical thrombus, restarted back on warfarin, presents for follow up.  \par \par Recent CVA in setting of recurrent LV thrombus when off anticoagulant, resumed on warfarin with therapeutic monthly INR checks, need level for this month and recheck CBC/CMP level.  Chronic angina with EKG unchanged. Recurrent angina despite prior PCI/PEPE, continue current antianginals increase Ranexa to 1000mg BID as reports dizziness with higher dose of Imdur use, advised to take sublingual nitro PRN, carvedilol 12.5mg BID given reports of dizziness/borderline SBP 90-100s at time. AICD interrogated today no VT/VF event and with >8 years battery life. \par \par \par 1. CAD with multiple stents and instent-restenosis of dLAD s/p PEPE x2 9/2020- off ASA and continue clopidogrel with warfarin; continue carvedilol to 12.5mg BID and continue Imdur 30mg and Ranexa 1000mg BID, advised to use sublingual nitrate PRN more often as needed, Continue high dose atorvastatin 80mg. \par \par 2. Ischemic cardiomyopathy, HFrEF- ACC/AHA stage B- continue carvedilol 12.5g BID and lisinopril to 5mg due to borderline low BP, continue spironolactone 25mg. \par \par 3. LV apical thrombus with recent CVA- was off warfarin since 2020, repeat TTE with echocontrast with recurrence of LV thrombus due to aneurysmal region, continue warfarin 3mg INR goal 2-3.  \par \par 4. History of 2:1 heart block, prior NSVT- AICD interrogated during hospitalization; continue carvedilol, EPS follow up. \par \par \par Strongly advised COVID and influenza vaccination again. \par \par Follow up in 6 months. \par \par

## 2022-01-21 NOTE — HISTORY OF PRESENT ILLNESS
[FreeTextEntry1] : 51M Creole-speaking man with history of CAD with multivessel stentings and MI (2017 at [facility] with restenosis of RCA and LAD s/p PEPE to mRCA), LV apical thrombus (previously been on warfarin since  discontinued on ), HFrEF (35-40% in ), syncope with 2:1 heart block s/p Medtronic AICD 2018 (DDD ), CVA (2019) and with unstable angina in 2019 s/p PCI/stent to mLCx at [facility], previously follows at [facility] with Dr. Crook but since left practice without follow up, presented to [facility] on 20 with unstable angina, serial Troponin negative, repeaT TTE with EF 40-45% (upon my own review) and resolved LV apical thrombus (known since 2019 but unclear reason remains on warfarin and self discontinued clopidogrel 75mg since 2019), underwent LHC found with patent stents in LCx, RCA and moderate-severe instent restenosis of kus-gh-fvyhms LAD, given small caliber size of the vessel decided medical management with antianginal (increased Imdur to 120mg), presented for initial outpatient cardiology evaluation on 9/15/20, added Ranexa to Imdur for severe angina, still with refractory symptoms and he presented again to [facility] on 20, underwent PCI/PEPE x2 to dLAD for 95% severe instent restenosis, repeat TTE off warfarin found with apical akinesis/aneurysm with calcified mural thrombus, LV EF 40-45%, had 21 beats WCT, increased carvedilol dose to 25mg BID, discharged home on 20, prior visit 2021 after returned back from [facility] with borderline SBP redcued carvedilol dose to 12.5mg, last visit 2021 interval with recurrent CVAs had repeat TTE with [facility] confirmed recurrence of LV apical thrombus, restarted back on warfarin, presents for follow up.  \par \par Still has chronic angina, takes sublingual only once. Has labile BP with high readings SBP 170s but then would be low after nitroglycerin use. Denies bleeding on warfarin/clopidogrel use. \par \par Still not yet obtain COVID vaccine. \par \par Prior visit 2021: \par He's been living in [place] with his significant other, had been hospitalized twice there, 1st episode episode in 2021 for chest pain had Echo and nuclear stress test done showed no ischemia, 2nd admission 10/2021 for acute stroke symptoms (facial droop and L-sided weakness for few hours), symptoms completely resolved once in the ER, noted to be hypertensive 184/104, did not undergo MRI brain cause he was claustrophobic but repeat CT head done no new infarct, LDL 89 increased atorvastatin to 80mg. \par \par Been feeling well since returning back to [place], still has chronic angina at rest, able to walk for 30 minutes, still with nonspecific paresthesia sensation of hotness in his body and feeling dizzy after his medications use. \par \par Remains hesitant about COVID vaccine. \par \par Prior visit 2021\par Patient presents with is sister-in-law requesting her to interpret. \par \par He left for [facility] since 10/2020 and just returned this week, still with intermittent chronic angina, had one episode yesterday lasted for over 1 hour, does not take sublingual nitro as reports cause his BP to be high, but when take his meds would see SBP 90-100s and would take Imdur 30 BID instead, self paying for all his medications. Having intermittent palpitations lasting few seconds and lasted for few hours, denies ICD shock. Currently with his travel visa expiring in July. \par \ester Has not yet schedule for the COVID vaccine due to concern from his wife. \par \par \par Prior visit 2020:\par He reports still having chest pain daily and sometime can last throughout the day, has not tried sublingual nitro. Sometimes feel as fire sensation, doesn't take any PPI/antacids. He's planning to return to [facility] for 4-5 months to resume PT for his R-leg from prior stroke. \par \par Prior visit 9/15/20:\par Patient reports SBP 80-90s with dizziness at times when he uses Imdur 120mg, continues to have L-sided chest pain but mainly at night and in the morning, at times also with ambulation, typically last for about 30 minutes. He remains compliant with ASA/clopidogrel, off warfarin. He is planning ot return to [facility] in about 1-2 month as his visa is about to be . \par \par \par Lipid panel:\par TG 58, , HDL 39, LDL 54

## 2022-01-21 NOTE — CARDIOLOGY SUMMARY
[___] : [unfilled] [LVEF ___%] : LVEF [unfilled]% [de-identified] : 11/15/21- sinus 75, normal axis, inferior infarct, precordial ST, lateral T-wave inversion, QTc 396\par 1/21/22- sinus 75, normal axis, inferolateral infarct, precordial ST, lateral T-wave inversion, QTc 386 [de-identified] : 11/22/21- LV EF 40-45%, apical aneurysmal with slow flow, 0.6 x 0.7 cm thrombus

## 2022-01-21 NOTE — PHYSICAL EXAM

## 2022-01-28 LAB
ALBUMIN SERPL ELPH-MCNC: 5 G/DL
ALP BLD-CCNC: 65 U/L
ALT SERPL-CCNC: 18 U/L
ANION GAP SERPL CALC-SCNC: 10 MMOL/L
AST SERPL-CCNC: 23 U/L
BASOPHILS # BLD AUTO: 0.01 K/UL
BASOPHILS NFR BLD AUTO: 0.3 %
BILIRUB SERPL-MCNC: 0.6 MG/DL
BUN SERPL-MCNC: 12 MG/DL
CALCIUM SERPL-MCNC: 9.8 MG/DL
CHLORIDE SERPL-SCNC: 99 MMOL/L
CO2 SERPL-SCNC: 29 MMOL/L
CREAT SERPL-MCNC: 1.14 MG/DL
EOSINOPHIL # BLD AUTO: 0.03 K/UL
EOSINOPHIL NFR BLD AUTO: 0.8 %
GLUCOSE SERPL-MCNC: 68 MG/DL
HCT VFR BLD CALC: 48.4 %
HGB BLD-MCNC: 15.3 G/DL
IMM GRANULOCYTES NFR BLD AUTO: 0.3 %
INR PPP: 3.06 RATIO
LYMPHOCYTES # BLD AUTO: 1.67 K/UL
LYMPHOCYTES NFR BLD AUTO: 45.4 %
MAN DIFF?: NORMAL
MCHC RBC-ENTMCNC: 29.7 PG
MCHC RBC-ENTMCNC: 31.6 GM/DL
MCV RBC AUTO: 94 FL
MONOCYTES # BLD AUTO: 0.55 K/UL
MONOCYTES NFR BLD AUTO: 14.9 %
NEUTROPHILS # BLD AUTO: 1.41 K/UL
NEUTROPHILS NFR BLD AUTO: 38.3 %
PLATELET # BLD AUTO: 187 K/UL
POTASSIUM SERPL-SCNC: 4.5 MMOL/L
PROT SERPL-MCNC: 7.4 G/DL
PT BLD: 34.2 SEC
RBC # BLD: 5.15 M/UL
RBC # FLD: 12.8 %
SODIUM SERPL-SCNC: 138 MMOL/L
WBC # FLD AUTO: 3.68 K/UL

## 2022-01-30 ENCOUNTER — RESULT CHARGE (OUTPATIENT)
Age: 52
End: 2022-01-30

## 2022-02-03 NOTE — PROGRESS NOTE ADULT - PROVIDER SPECIALTY LIST ADULT
Cardiology
Electrophysiology
Hospitalist
Medical Progress Note      Chief Complaint: Sepsis    History Of Present Illness  Lupis is a 63 year old female with PMHx of cholangiocarcinoma (previously untreated), T2DM, HTN who was admitted to [facility] with sepsis. The patient was found to have bilateral Pes with near occlusive right and left-sided DVTs. She had a recent stent placed in the CBD for obstruction and was found to have new pneumobilia with increasing liver lesions, increased pancreatic ductal dilation, and increasing mesenteric nodularity concerning for carcinomatosis. Her hospital stay was further complicated by bacteremia with Klebsiella, Enterococcus faecalis, and yeast. The patient was acutely anemic requiring transfusions of PRBCs, PLTS, and FFPs. She was hypotensive and started on pressors. Patient transferred to [facility] ICU for higher level of care. Here, she was hemodynamically unstable with worsening respiratory status and was subsequently intubated. The patient is admitted with multiple specialists on consult.   She did have a liver drain placed with considerable output initially that has since slowed. Bcx positive for VRE. She remains intubated - S/p ERCP with stent placement yesterday. Drain with significant bilious outpatient. Patient still on low dose Levophed. NG placed and Tube Feeds initiated    Patient seen and examined remains unresponsive    Past Medical History  Cholangiocarcinoma    Surgical History  No past surgical history on file.     Social History  Social History     Tobacco Use   • Smoking status: Unknown If Ever Smoked   Substance Use Topics   • Alcohol use: Never       Family History  No family history on file.     Allergies  ALLERGIES:  Triamterene-hctz and Hydrochlorothiazide    Medications  Medications Prior to Admission   Medication Sig Dispense Refill   • SITagliptin-metFORMIN HCl (JANUMET XR PO) Take 1 tablet by mouth daily.         Review of Systems  Unable to obtain: acuity of condition       Last 
Pulmonology
Recorded Vitals  Blood pressure 96/69, pulse 95, temperature 97.7 °F (36.5 °C), temperature source Temporal, resp. rate 14, height 5' 6\" (1.676 m), weight 97.7 kg (215 lb 6.2 oz), SpO2 100 %.     General: Intubated  Skin: warm, no laceration, no lesions  Heme: no petechiae   Eyes: The eyes are anicteric, pupils equal, EOMI.    HEENT: normal MM, no jaw malocclusion   CV: The heart rate is regular in rate and rhythm.  Cap refill is <3 seconds in all extremities.    Pulm: coarse diffusely     GI: hepatomegaly. +BS all 4 quadrants  : deferred  Extremities: +edema  Neuro: L Hemiparesis    Imaging  LAST CT:  === 01/29/22 ===    CT OUTSIDE STUDY    - Narrative -  This is a study performed at an outside facility with images uploaded to [facility].    ___________________________________________________________________________    CT OUTSIDE STUDY    - Narrative -  This is a study performed at an outside facility with images uploaded to [facility].    ___________________________________________________________________________    CT OUTSIDE STUDY    - Narrative -  This is a study performed at an outside facility with images uploaded to [facility].  LAST X-RAY:  === 01/29/22 ===    XR CHEST PA OR AP 1 VIEW    - Narrative -  EXAM: XR CHEST PA OR AP 1 VIEW    CLINICAL INDICATION: Sepsis    COMPARISON: Earlier examination    FINDINGS: Tip of an endotracheal tube 1.4 cm above the karina. Right-sided  central line tip SVC right atrial junction.    Low lung volumes accentuating heart size. No airspace consolidation.    Edema with small pleural effusions. No change.    Opacity in the left lower lobe with air bronchograms suspicious for  infiltrate.    - Impression -  No change.      Electronically Signed by: PEDRO LUIS JAMES M.D.  Signed on: 1/29/2022 10:06 AM    ___________________________________________________________________________    XR CHEST PA OR AP 1 VIEW    - Narrative -  EXAM: XR CHEST PA OR AP 1 
Hospitalist
VIEW    CLINICAL INDICATION: Sepsis    COMPARISON: None.    FINDINGS: Right-sided the central line with the tip in the right atrium.    Cardiomegaly.    Opacities are noted bilaterally, air bronchograms in the left lower lobe.  Suspicious for infiltrates. Vascular congestion is also present.    Elevation of the right hemidiaphragm.    - Impression -  Infiltrates are noted bilaterally with air bronchograms in the left lower  lobe. There is also vascular congestion.      Electronically Signed by: PEDRO LUIS JAMES M.D.  Signed on: 1/29/2022 7:29 AM    ___________________________________________________________________________    XRAY OUTSIDE STUDY    - Narrative -  This is a study performed at an outside facility with images uploaded to [facility].    Labs     Last WBC:    WBC (K/mcL)   Date Value   02/02/2022 14.9 (H)     LAST RBC:    RBC (mil/mcL)   Date Value   02/02/2022 2.56 (L)     LAST HCT:    HCT (%)   Date Value   02/02/2022 21.6 (L)     LAST HGB:    HGB (g/dL)   Date Value   02/02/2022 7.2 (L)     LAST PLT:    PLT (K/mcL)   Date Value   02/02/2022 12 (LL)     LAST SODIUM:  Sodium (mmol/L)   Date Value   02/02/2022 141     LAST POTASSIUM:    Potassium (mmol/L)   Date Value   02/02/2022 3.6     LAST CHLORIDE:    Chloride (mmol/L)   Date Value   02/02/2022 99     LAST GLUCOSE:    Glucose (mg/dL)   Date Value   02/02/2022 218 (H)     LAST CALCIUM:  Calcium (mg/dL)   Date Value   02/02/2022 8.9     LAST CO2:    Carbon Dioxide (mmol/L)   Date Value   02/02/2022 25     LAST BUN:  BUN (mg/dL)   Date Value   02/02/2022 82 (H)     LAST CREATININE:    Creatinine (mg/dL)   Date Value   02/02/2022 2.48 (H)     LAST MALBCR:  No results found for: MALBCR  LAST TROPONIN:  No results found for: TROP    LAST MICRO:  No results found for: MICRO    Assessment/Plan  Active Problems:    * No active hospital problems. *    Septic shock with polymicobial bacteremia,   Lactic acidosis  liver abscess drain inserted per IR  ERCP  
with stent placement  On levophed, antibiotics per ID    Acute respiratory failure  Intubated, on mechanical ventilation.     Acute on chronic renal failure  Generalized edema on bumex drip    Cardiovascular  B/l PE s/p IVC filter placement  B/l DVT    GI  Liver abscess  Metastatic Cholangiocarcinoma  Acute Cholangitis    Patient with poor prognosis. ICU team discussing with Family Goals of care  Grim Prognosis.       
Hospitalist

## 2022-02-18 ENCOUNTER — RX RENEWAL (OUTPATIENT)
Age: 52
End: 2022-02-18

## 2022-02-26 ENCOUNTER — INPATIENT (INPATIENT)
Facility: HOSPITAL | Age: 52
LOS: 1 days | Discharge: HOME | DRG: 287 | End: 2022-02-28
Attending: STUDENT IN AN ORGANIZED HEALTH CARE EDUCATION/TRAINING PROGRAM | Admitting: STUDENT IN AN ORGANIZED HEALTH CARE EDUCATION/TRAINING PROGRAM
Payer: MEDICAID

## 2022-02-26 VITALS
RESPIRATION RATE: 18 BRPM | HEART RATE: 68 BPM | TEMPERATURE: 98 F | DIASTOLIC BLOOD PRESSURE: 90 MMHG | SYSTOLIC BLOOD PRESSURE: 134 MMHG | WEIGHT: 199.96 LBS | OXYGEN SATURATION: 98 % | HEIGHT: 70 IN

## 2022-02-26 DIAGNOSIS — I50.22 CHRONIC SYSTOLIC (CONGESTIVE) HEART FAILURE: ICD-10-CM

## 2022-02-26 DIAGNOSIS — I10 ESSENTIAL (PRIMARY) HYPERTENSION: ICD-10-CM

## 2022-02-26 DIAGNOSIS — I51.3 INTRACARDIAC THROMBOSIS, NOT ELSEWHERE CLASSIFIED: ICD-10-CM

## 2022-02-26 DIAGNOSIS — I71.00 DISSECTION OF UNSPECIFIED SITE OF AORTA: ICD-10-CM

## 2022-02-26 DIAGNOSIS — I25.119 ATHEROSCLEROTIC HEART DISEASE OF NATIVE CORONARY ARTERY WITH UNSPECIFIED ANGINA PECTORIS: ICD-10-CM

## 2022-02-26 DIAGNOSIS — Z02.9 ENCOUNTER FOR ADMINISTRATIVE EXAMINATIONS, UNSPECIFIED: ICD-10-CM

## 2022-02-26 DIAGNOSIS — Z95.810 PRESENCE OF AUTOMATIC (IMPLANTABLE) CARDIAC DEFIBRILLATOR: Chronic | ICD-10-CM

## 2022-02-26 LAB
ALBUMIN SERPL ELPH-MCNC: 4.4 G/DL — SIGNIFICANT CHANGE UP (ref 3.3–5.2)
ALP SERPL-CCNC: 55 U/L — SIGNIFICANT CHANGE UP (ref 40–120)
ALT FLD-CCNC: 21 U/L — SIGNIFICANT CHANGE UP
ANION GAP SERPL CALC-SCNC: 10 MMOL/L — SIGNIFICANT CHANGE UP (ref 5–17)
APTT BLD: 36.2 SEC — HIGH (ref 27.5–35.5)
AST SERPL-CCNC: 23 U/L — SIGNIFICANT CHANGE UP
BASOPHILS # BLD AUTO: 0.02 K/UL — SIGNIFICANT CHANGE UP (ref 0–0.2)
BASOPHILS NFR BLD AUTO: 0.4 % — SIGNIFICANT CHANGE UP (ref 0–2)
BILIRUB SERPL-MCNC: 0.4 MG/DL — SIGNIFICANT CHANGE UP (ref 0.4–2)
BLD GP AB SCN SERPL QL: SIGNIFICANT CHANGE UP
BUN SERPL-MCNC: 14.7 MG/DL — SIGNIFICANT CHANGE UP (ref 8–20)
CALCIUM SERPL-MCNC: 8.9 MG/DL — SIGNIFICANT CHANGE UP (ref 8.6–10.2)
CHLORIDE SERPL-SCNC: 103 MMOL/L — SIGNIFICANT CHANGE UP (ref 98–107)
CO2 SERPL-SCNC: 28 MMOL/L — SIGNIFICANT CHANGE UP (ref 22–29)
CREAT SERPL-MCNC: 0.95 MG/DL — SIGNIFICANT CHANGE UP (ref 0.5–1.3)
EOSINOPHIL # BLD AUTO: 0.04 K/UL — SIGNIFICANT CHANGE UP (ref 0–0.5)
EOSINOPHIL NFR BLD AUTO: 0.7 % — SIGNIFICANT CHANGE UP (ref 0–6)
GLUCOSE SERPL-MCNC: 87 MG/DL — SIGNIFICANT CHANGE UP (ref 70–99)
HCT VFR BLD CALC: 47.1 % — SIGNIFICANT CHANGE UP (ref 39–50)
HGB BLD-MCNC: 15.6 G/DL — SIGNIFICANT CHANGE UP (ref 13–17)
IMM GRANULOCYTES NFR BLD AUTO: 0.4 % — SIGNIFICANT CHANGE UP (ref 0–1.5)
INR BLD: 2.04 RATIO — HIGH (ref 0.88–1.16)
LYMPHOCYTES # BLD AUTO: 1.89 K/UL — SIGNIFICANT CHANGE UP (ref 1–3.3)
LYMPHOCYTES # BLD AUTO: 34.2 % — SIGNIFICANT CHANGE UP (ref 13–44)
MAGNESIUM SERPL-MCNC: 2 MG/DL — SIGNIFICANT CHANGE UP (ref 1.6–2.6)
MCHC RBC-ENTMCNC: 30.6 PG — SIGNIFICANT CHANGE UP (ref 27–34)
MCHC RBC-ENTMCNC: 33.1 GM/DL — SIGNIFICANT CHANGE UP (ref 32–36)
MCV RBC AUTO: 92.5 FL — SIGNIFICANT CHANGE UP (ref 80–100)
MONOCYTES # BLD AUTO: 0.66 K/UL — SIGNIFICANT CHANGE UP (ref 0–0.9)
MONOCYTES NFR BLD AUTO: 12 % — SIGNIFICANT CHANGE UP (ref 2–14)
NEUTROPHILS # BLD AUTO: 2.89 K/UL — SIGNIFICANT CHANGE UP (ref 1.8–7.4)
NEUTROPHILS NFR BLD AUTO: 52.3 % — SIGNIFICANT CHANGE UP (ref 43–77)
NT-PROBNP SERPL-SCNC: 95 PG/ML — SIGNIFICANT CHANGE UP (ref 0–300)
PLATELET # BLD AUTO: 187 K/UL — SIGNIFICANT CHANGE UP (ref 150–400)
POTASSIUM SERPL-MCNC: 4.5 MMOL/L — SIGNIFICANT CHANGE UP (ref 3.5–5.3)
POTASSIUM SERPL-SCNC: 4.5 MMOL/L — SIGNIFICANT CHANGE UP (ref 3.5–5.3)
PROT SERPL-MCNC: 7 G/DL — SIGNIFICANT CHANGE UP (ref 6.6–8.7)
PROTHROM AB SERPL-ACNC: 23.8 SEC — HIGH (ref 10.5–13.4)
RBC # BLD: 5.09 M/UL — SIGNIFICANT CHANGE UP (ref 4.2–5.8)
RBC # FLD: 12.3 % — SIGNIFICANT CHANGE UP (ref 10.3–14.5)
SARS-COV-2 RNA SPEC QL NAA+PROBE: SIGNIFICANT CHANGE UP
SODIUM SERPL-SCNC: 140 MMOL/L — SIGNIFICANT CHANGE UP (ref 135–145)
TROPONIN T SERPL-MCNC: <0.01 NG/ML — SIGNIFICANT CHANGE UP (ref 0–0.06)
TROPONIN T SERPL-MCNC: <0.01 NG/ML — SIGNIFICANT CHANGE UP (ref 0–0.06)
WBC # BLD: 5.52 K/UL — SIGNIFICANT CHANGE UP (ref 3.8–10.5)
WBC # FLD AUTO: 5.52 K/UL — SIGNIFICANT CHANGE UP (ref 3.8–10.5)

## 2022-02-26 PROCEDURE — 76705 ECHO EXAM OF ABDOMEN: CPT | Mod: 26

## 2022-02-26 PROCEDURE — 71045 X-RAY EXAM CHEST 1 VIEW: CPT | Mod: 26

## 2022-02-26 PROCEDURE — G1004: CPT

## 2022-02-26 PROCEDURE — 93306 TTE W/DOPPLER COMPLETE: CPT | Mod: 26

## 2022-02-26 PROCEDURE — 71275 CT ANGIOGRAPHY CHEST: CPT | Mod: 26,MG

## 2022-02-26 PROCEDURE — 99497 ADVNCD CARE PLAN 30 MIN: CPT | Mod: 25

## 2022-02-26 PROCEDURE — 99285 EMERGENCY DEPT VISIT HI MDM: CPT | Mod: 25

## 2022-02-26 PROCEDURE — 74174 CTA ABD&PLVS W/CONTRAST: CPT | Mod: 26,MG

## 2022-02-26 PROCEDURE — 70450 CT HEAD/BRAIN W/O DYE: CPT | Mod: 26,MG

## 2022-02-26 PROCEDURE — 99223 1ST HOSP IP/OBS HIGH 75: CPT

## 2022-02-26 PROCEDURE — 99222 1ST HOSP IP/OBS MODERATE 55: CPT

## 2022-02-26 PROCEDURE — 93010 ELECTROCARDIOGRAM REPORT: CPT

## 2022-02-26 RX ORDER — LISINOPRIL 2.5 MG/1
20 TABLET ORAL DAILY
Refills: 0 | Status: DISCONTINUED | OUTPATIENT
Start: 2022-02-26 | End: 2022-02-28

## 2022-02-26 RX ORDER — CARVEDILOL PHOSPHATE 80 MG/1
12.5 CAPSULE, EXTENDED RELEASE ORAL EVERY 12 HOURS
Refills: 0 | Status: DISCONTINUED | OUTPATIENT
Start: 2022-02-26 | End: 2022-02-28

## 2022-02-26 RX ORDER — RANOLAZINE 500 MG/1
1000 TABLET, FILM COATED, EXTENDED RELEASE ORAL
Refills: 0 | Status: DISCONTINUED | OUTPATIENT
Start: 2022-02-26 | End: 2022-02-28

## 2022-02-26 RX ORDER — RANOLAZINE 500 MG/1
0 TABLET, FILM COATED, EXTENDED RELEASE ORAL
Qty: 0 | Refills: 0 | DISCHARGE

## 2022-02-26 RX ORDER — INFLUENZA VIRUS VACCINE 15; 15; 15; 15 UG/.5ML; UG/.5ML; UG/.5ML; UG/.5ML
0.5 SUSPENSION INTRAMUSCULAR ONCE
Refills: 0 | Status: DISCONTINUED | OUTPATIENT
Start: 2022-02-26 | End: 2022-02-28

## 2022-02-26 RX ORDER — WARFARIN SODIUM 2.5 MG/1
4 TABLET ORAL AT BEDTIME
Refills: 0 | Status: DISCONTINUED | OUTPATIENT
Start: 2022-02-26 | End: 2022-02-26

## 2022-02-26 RX ORDER — SPIRONOLACTONE 25 MG/1
25 TABLET, FILM COATED ORAL DAILY
Refills: 0 | Status: DISCONTINUED | OUTPATIENT
Start: 2022-02-26 | End: 2022-02-28

## 2022-02-26 RX ORDER — ATORVASTATIN CALCIUM 80 MG/1
80 TABLET, FILM COATED ORAL AT BEDTIME
Refills: 0 | Status: DISCONTINUED | OUTPATIENT
Start: 2022-02-26 | End: 2022-02-28

## 2022-02-26 RX ORDER — CLOPIDOGREL BISULFATE 75 MG/1
75 TABLET, FILM COATED ORAL DAILY
Refills: 0 | Status: DISCONTINUED | OUTPATIENT
Start: 2022-02-26 | End: 2022-02-28

## 2022-02-26 RX ORDER — ENOXAPARIN SODIUM 100 MG/ML
90 INJECTION SUBCUTANEOUS
Refills: 0 | Status: DISCONTINUED | OUTPATIENT
Start: 2022-02-27 | End: 2022-02-27

## 2022-02-26 RX ORDER — CARVEDILOL PHOSPHATE 80 MG/1
12.5 CAPSULE, EXTENDED RELEASE ORAL DAILY
Refills: 0 | Status: COMPLETED | OUTPATIENT
Start: 2022-02-26 | End: 2022-02-26

## 2022-02-26 RX ORDER — SODIUM CHLORIDE 9 MG/ML
1000 INJECTION INTRAMUSCULAR; INTRAVENOUS; SUBCUTANEOUS ONCE
Refills: 0 | Status: COMPLETED | OUTPATIENT
Start: 2022-02-26 | End: 2022-02-26

## 2022-02-26 RX ADMIN — RANOLAZINE 1000 MILLIGRAM(S): 500 TABLET, FILM COATED, EXTENDED RELEASE ORAL at 22:06

## 2022-02-26 RX ADMIN — SODIUM CHLORIDE 500 MILLILITER(S): 9 INJECTION INTRAMUSCULAR; INTRAVENOUS; SUBCUTANEOUS at 13:29

## 2022-02-26 RX ADMIN — SODIUM CHLORIDE 1000 MILLILITER(S): 9 INJECTION INTRAMUSCULAR; INTRAVENOUS; SUBCUTANEOUS at 13:30

## 2022-02-26 RX ADMIN — ATORVASTATIN CALCIUM 80 MILLIGRAM(S): 80 TABLET, FILM COATED ORAL at 21:43

## 2022-02-26 RX ADMIN — CARVEDILOL PHOSPHATE 12.5 MILLIGRAM(S): 80 CAPSULE, EXTENDED RELEASE ORAL at 21:43

## 2022-02-26 NOTE — H&P ADULT - PROBLEM SELECTOR PLAN 3
- Likely chroni- F/u TTE  - - Likely chronic  - F/u repeat TTE - Likely chronic. Will hold off nitroglycerin given hypotension  - c/w plavix and ranolazine 1000 mg BID  - F/u repeat TTE

## 2022-02-26 NOTE — H&P ADULT - HISTORY OF PRESENT ILLNESS
Patient is a 51M w/ hx of CAD w/ multivessel stenting, MI w/ restenosis of RCA and LAD (2017), LV apical thrombus (coumadin), HFrEF (EF 40-45%), syncope with 2:1 heart block s/p Medtronic AICD 5/2018, CVA who presents to the ED w/ LT-sided chest pain. He endorses that his pain is 5/10 and has been feeling lightheaded. Patient took ASA today morning.     In the ED, his vitals were WNL. Labs notable for PT/INR- 2.04. CTA A/P showed no thoracic aortic dissection or intramural hematoma, but was concerning for focal dissection involving distal abdominal aorta. Patient received 1L IVF bolus.  Patient is a 51M w/ hx of CAD w/ multivessel stenting, MI w/ restenosis of RCA and LAD (2017), LV apical thrombus (coumadin), HFrEF (EF 40-45%), syncope with 2:1 heart block s/p Medtronic AICD 5/2018, CVA who presents to the ED w/ LT-sided chest pain. He endorses that his pain is 5/10 and has been feeling lightheaded. Patient took ASA today morning.     In the ED, his vitals were WNL. Labs notable for PT/INR- 2.04. CTA A/P showed no thoracic aortic dissection or intramural hematoma, but was concerning for focal dissection involving distal abdominal aorta. Patient received 1L IVF bolus.    Patient is a 51M w/ hx of CAD w/ multivessel stenting, MI w/ restenosis of RCA and LAD (2017), LV apical thrombus (coumadin), HFrEF (EF 40-45%), syncope with 2:1 heart block s/p Medtronic AICD 5/2018, CVA who presents to the ED w/ LT-sided chest pain. He was in usual state of health when he started feeling lightheaded, diaphoretic and chest pain. He endorses LT-sided chest pain, 5/10 and non-radiating. His BP was 80/50. Denies LOC, SOB, n/v/f/c and abdominal pain. No prior hx of similar symptoms.    In the ED, his vitals were WNL. Labs notable for PT/INR- 2.04. CTA A/P showed no thoracic aortic dissection or intramural hematoma, but was concerning for focal dissection involving distal abdominal aorta. Patient received 1L IVF bolus.    Creole ID#. [id number]. Patient is a 51M w/ hx of CAD w/ multivessel stenting, MI w/ restenosis of RCA and LAD (2017), LV apical thrombus (coumadin), HFrEF (EF 40-45%), syncope with 2:1 heart block s/p Medtronic AICD 5/2018, CVA who presents to the ED w/ LT-sided chest pain. He was in usual state of health when he started feeling lightheaded, diaphoretic and chest pain. He endorses LT-sided chest pain, 5/10 and non-radiating. His BP was 80/50. Denies LOC, SOB, n/v/f/c and abdominal pain. No prior hx of similar symptoms.    In the ED, his vitals were WNL. Labs notable for PT/INR- 2.04. CTA A/P showed no thoracic aortic dissection or intramural hematoma, but was concerning for focal dissection involving distal abdominal aorta. Patient received 1L IVF bolus.

## 2022-02-26 NOTE — H&P ADULT - PROBLEM SELECTOR PLAN 5
- will hold imdur given concern for hypotension - will hold Imdur and spironolactone given concern for hypotension  - F/u TTE - will hold Imdur given concern for hypotension  - c/w spironolactone 25 mg QD and lisinopril 20 mg QD  - F/u TTE

## 2022-02-26 NOTE — CONSULT NOTE ADULT - SUBJECTIVE AND OBJECTIVE BOX
[facility] CARDIOLOGY[facility]                                                       [facility]                                                           Office:  [address] [address]                                                          Telephone: [phone]. Fax:[phone]                                                                        CARDIOLOGY CONSULTATION NOTE                                                                                             Consult requested by:  Dr. Vergara  Reason for Consultation: Chest Pain  History obtained by: Patient, patient's cousin and medical record   obtained: No  Cardiologist: Dr. Rush  :     Chief complaint:    Patient is a 51y old  Male who presents with a chief complaint of chest pain      HPI:  51M Creole-speaking man with history of CAD with multivessel stenting , MI (2017 at [facility] with restenosis of RCA and LAD s/p PEPE to mRCA), LV apical thrombus (on warfarin), HFrEF (EF 40-45%, Nov2021), syncope with 2:1 heart block s/p Medtronic AICD 5/2018 (DDD ), CVA (6/2019) and with chronic angina presents to with complaints of 1 week of left sided chest pain, without radiation described as burning and currently 5/10, associated with diaphoresis. Patient and patient's cousin report that he took his Imdur and Ranexa this morning and felt weak, dizzy and diaphoretic. Cousin checked his BP and found him to be hypotensive (80/60). He then took ASA 81mg because he was scared he was having an MI and came to the hospital. He did not take any of his other medications today. Patient was recently seen at OP Cardiology office January 21,2022 (see course below). Denies back pain, headache, dizziness, SOB, NORIEGA, syncope, blood in stool/urine or N/V.    Notable events:  9/15/ 2020: Seen in office and with severe angina. Given small caliber size of the vessel decided medical management with antianginal Ranexa and Imdur.   9/23/20: underwent PCI/PEPE x2 to dLAD for 95% severe in-stent restenosis, TTE was repeated (off warfarin) found with apical akinesis/aneurysm with calcified mural thrombus.   9/2021-10/2021: patient was living in [place] and in Sept '21 hospitalized for chest pain (no ischemia) and again 10/2021 for acute stroke symptoms (facial droop and left sided weakness)  11/2021: Returned to [place] , recurrent CVAs, had repeat TTE with Definity confirmed recurrence of LV apical thrombus, restarted back on warfarin. Remained on Plavix & Coumadin, no ASA  1/21/2022: Followed up with Dr. Rush. Has chronic angina and feel dizzy after taking medications.       REVIEW OF SYMPTOMS:   CONSTITUTIONAL: No fever, no weight loss, no fatigue, no weight gain   ENMT:  No difficulty hearing, tinnitus, vertigo; No sinus or throat pain  NECK: No pain or stiffness  CARDIOVASCULAR: endorses chest pain left sided and diaphoresis, no dyspnea, no syncope, no palpitations, no dizziness, no Orthopnea, no Paroxsymal nocturnal dyspnea  RESPIRATORY: No Dyspnea on exertion, no Shortness of breath, no cough, no wheezing  : No dysuria, no hematuria   GI: No dark color stool, no melena, no diarrhea, no constipation, no abdominal pain   NEURO: No headache, no dizziness, no slurred speech   MUSCULOSKELETAL: No joint pain or swelling; No muscle, back, or extremity pain  PSYCH: No agitation, no anxiety.    ALL OTHER REVIEW OF SYSTEMS ARE NEGATIVE.      PREVIOUS DIAGNOSTIC TESTING  ECHO FINDINGS:  TTE Nov 2021: EF 40-45%, calcified lesion LV apical wall (0.6cm x 0.7cm) old mural thrombus. mild to mod decreased segmental left ventricular systolic function.       STRESS FINDINGS:      CATHETERIZATION FINDINGS:     < from: Cardiac Cath Lab - Adult (09.23.20 @ 16:48) >  INDICATIONS: Unstable angina - CCS4.  HISTORY: History of CAD with mid to distal LAD stenting. Presented 3 weeks  prior with chest pain and noted to have severe instent restenosis.  Considering small caliber vessel, instent restenosis and need for long  area of intervention, decision was made to medically manage. Patient on 2  anti anginals and continued to have chest pain. Presented to ED with  active symptoms, at rest. Class IV unstable angina, ACS>24 hours.  PROCEDURE:  --  Left coronary angiography.  --  Sonosite.  --  Interventional IVUS.  --  Hemostasis with Angioseal-Intervention.  --  Intervention on distal LAD: drug-eluting stent.  TECHNIQUE: The risks and alternatives of the procedures and conscious  sedation were explained to the patient and informed consent was obtained.  Cardiac catheterization performed electively. Coronary intervention  performed electively.  Local anesthetic given. Right femoral artery access. Left coronary artery  angiography. The vessel was injected utilizing a catheter. Sonosite.  RADIATION EXPOSURE: 13.6 min. A drug-eluting stent was performed on the 95  % lesion in the distal LAD. Following intervention there was a 1 %  residual stenosis. There was no dissection. Balloon angioplasty was  performed, using a EMERGE MR 2.00MM X 30MM balloon, with 3 inflations and  a maximum inflation pressure of 16 jackelin. During the procedure, the previous  guider was changed for a 6F EBU3.5 LAUNCHER guider, and a new WIGGLE 190CM  wire was advanced across the lesion. Intravascular ultrasound was  performed using a(n) EAGLE EYE SHORT TIP catheter over a previously placed  guidewire. Balloon angioplasty was performed, using a RX 2.0 X 15 X 139  PTCA ANGIOSCULPT balloon, with 5 inflations and a maximum inflation  pressure of 16 jackelin. A SYNERGY 2.25MM X 38MM drug-eluting stent was placed  across the lesion and deployed at a maximum inflation pressure of 8 jackelin. A  SYNERGY 2.50MM X 20MM drug-eluting stent was placed across the lesion and  deployed at a maximum inflation pressure of 12 jackelin. Balloon angioplasty  was performed, with 1 inflations and a maximum inflation pressure of 16  jackelin. Balloon angioplasty was performed, using a NC EMERGE 2.50 X 30MM  balloon, with 3 inflations and a maximum inflation pressure of 20 jackelin.  Interventional IVUS. Hemostasis with Angioseal-Intervention.  CONTRAST GIVEN: Omnipaque 7 ml. Omnipaque 116 ml.  MEDICATIONS GIVEN: Midazolam, 1 mg, IV. Fentanyl, 50 mcg, IV. Fentanyl, 50  mcg, IV. Nitroglycerin, 200 mcg, intracoronary. Nitroglycerin, 200 mcg,  intracoronary. Nitroglycerin, 200 mcg, intracoronary. Nitroglycerin, 200  mcg, intracoronary. Nitroglycerin, 200 mcg, intracoronary. Heparin, 6000  units, IV. Clopidogrel (Plavix), 600 mg, PO. 1% Lidocaine, 10 ml,  subcutaneously.  VENTRICLES: No LV gram was performed; however, a recent echocardiogram  demonstrated an EF of 30 %.  CORONARY VESSELS: The coronary circulation is right dominant.  LM:   --  LM: Normal.  LAD:   --  Distal LAD: There was a diffuse 95 % stenosis in-stent.  CX:   --  Proximal circumflex: There was a 20 % stenosis.  RCA:   --  RCA: This vessel was not injected.  COMPLICATIONS: No complications occurred during the cath lab visit.  DIAGNOSTIC IMPRESSIONS: Severe LAD instent restenosis. PCI performed with 2  PEPE.  DIAGNOSTIC RECOMMENDATIONS: Aspirin and plavix.  INTERVENTIONAL IMPRESSIONS: Severe LAD instent restenosis. PCI performed  with 2 PEPE.  INTERVENTIONAL RECOMMENDATIONS: Aspirin and plavix.  Prepared and signed by  Greg Escobar MD  Signed 09/25/2020 07:57:15    < end of copied text >      ALLERGIES: Allergies    No Known Allergies    Intolerances          PAST MEDICAL HISTORY  HTN (hypertension)  MI (myocardial infarction)  Thrombosis  Stented coronary artery  CAD (coronary artery disease)  Heart block  Former smoker  Status post CVA      PAST SURGICAL HISTORY  No significant past surgical history  Cardiac defibrillator in place    FAMILY HISTORY:    SOCIAL HISTORY:  Denies smoking/alcohol/drugs  CIGARETTES:   quit 3 years ago    CURRENT MEDICATIONS:    HOME MEDICATIONS:      Vital Signs Last 24 Hrs  T(C): 36.9 (26 Feb 2022 12:26), Max: 36.9 (26 Feb 2022 12:26)  T(F): 98.5 (26 Feb 2022 12:26), Max: 98.5 (26 Feb 2022 12:26)  HR: 65 (26 Feb 2022 12:26) (65 - 68)  BP: 120/72 (26 Feb 2022 12:26) (120/72 - 134/90)  BP(mean): --  RR: 18 (26 Feb 2022 12:26) (18 - 18)  SpO2: 96% (26 Feb 2022 12:26) (96% - 98%)      PHYSICAL EXAM:  Constitutional: Comfortable . No acute distress.   HEENT: Atraumatic and normocephalic , neck is supple . no JVD. No carotid bruit. PEERL   CNS: A&Ox3. No focal deficits. EOMI. Cranial nerves II-IX are intact.   Respiratory: CTAB, unlabored   Cardiovascular: S1S2 RRR. No murmur/rubs or gallop.  Gastrointestinal: Soft non-tender and non distended . +Bowel sounds. negative Hidalgo's sign.  Extremities: No edema.   Psychiatric: Calm . no agitation.  Skin: No skin rash/ulcers visualized to face, hands or feet.    Intake and output:     LABS:                        15.6   5.52  )-----------( 187      ( 26 Feb 2022 11:27 )             47.1     02-26    140  |  103  |  14.7  ----------------------------<  87  4.5   |  28.0  |  0.95    Ca    8.9      26 Feb 2022 11:27  Mg     2.0     02-26    TPro  7.0  /  Alb  4.4  /  TBili  0.4  /  DBili  x   /  AST  23  /  ALT  21  /  AlkPhos  55  02-26    CARDIAC MARKERS ( 26 Feb 2022 11:27 )  x     / <0.01 ng/mL / x     / x     / x        ;p-BNP=Serum Pro-Brain Natriuretic Peptide: 95 pg/mL (02-26 @ 11:27)    PT/INR - ( 26 Feb 2022 11:58 )   PT: 23.8 sec;   INR: 2.04 ratio         PTT - ( 26 Feb 2022 11:58 )  PTT:36.2 sec      INTERPRETATION OF TELEMETRY: Reviewed by me.   ECG: Reviewed by me. NSR unchanged from previous EKG (Jan 21, 2022)    RADIOLOGY & ADDITIONAL STUDIES:    X-ray:  reviewed by me.   < from: CT Angio Abdomen and Pelvis w/ IV Cont (02.26.22 @ 11:32) >    INTERPRETATION:  Clinical information: Chest pain. Evaluate for aortic   dissection.    CT angiogram of the chest was obtained following administration of   intravenous contrast. Noncontrast images of the chest were also obtained.   Approximately 97 cc of Omnipaque 350 was administered and 3 cc was   discarded.    Nohilar and or mediastinal adenopathy is noted.    Heart is enlarged in size. Focal calcification is noted involving the   apical segment of the left ventricular myocardium suggestive of prior   infarct in this region. Calcification of the coronary arteries is noted.   No pericardial effusion is noted. An AICD is noted in place. Aorta is   normal in caliber. There is no thoracic aortic aneurysm, dissection   and/or intramural hematoma. A probable focal dissection is noted   involving the distal abdominal aorta just above the aortic bifurcation.    No endobronchial lesions are noted. Lungs are clear. No pleural effusions   are noted.    Liver, spleen, pancreas, gallbladder and both adrenal glands are   unremarkable.    Both kidneys enhance with contrast. Cortical scarring is noted within   both kidneys. No hydronephrosis is noted. No retroperitoneal adenopathy   is noted.    Stool is noted throughout the large bowel.    Examination of the pelvis demonstrate no free fluid.    Degenerative changes of the spine are noted.    IMPRESSION: No thoracic aortic dissection and/or intramural hematoma is   noted.    A probable focal dissection is noted involving the distal abdominal aorta   just above the aortic bifurcation.    --- End of Report ---    < end of copied text >                                                      [facility]ARDIOLOGY[facility]                                                       [facility]                                                           Office:  [address] [address]                                                          Telephone: [phone]. Fax:[phone]                                                                        CARDIOLOGY CONSULTATION NOTE                                                                                             Consult requested by:  Dr. Vergara  Reason for Consultation: Chest Pain  History obtained by: Patient, patient's cousin and medical record   obtained: No  Cardiologist: Dr. Rush  :     Chief complaint:    Patient is a 51y old  Male who presents with a chief complaint of chest pain      HPI:  51M Creole-speaking man with history of CAD with multivessel stenting , MI (2017 at [facility] with restenosis of RCA and LAD s/p PEPE to mRCA), LV apical thrombus (on warfarin), HFrEF (EF 40-45%, Nov2021), syncope with 2:1 heart block s/p Medtronic AICD 5/2018 (DDD ), CVA (6/2019) and with chronic angina presents to with complaints of 1 week of left sided chest pain, without radiation described as burning and currently 5/10, associated with diaphoresis. Patient and patient's cousin report that he took his Imdur and Ranexa this morning and felt weak, dizzy and diaphoretic. Cousin checked his BP and found him to be hypotensive (80/60). He then took ASA 81mg because he was scared he was having an MI and came to the hospital. He did not take any of his other medications today. Patient was recently seen at [facility] Cardiology office January 21,2022 (see course below). Denies back pain, headache, dizziness, SOB, NORIEGA, syncope, blood in stool/urine or N/V.    Notable events:  9/15/ 2020: Seen in office and with severe angina. Given small caliber size of the vessel decided medical management with antianginal Ranexa and Imdur.   9/23/20: underwent PCI/PEPE x2 to dLAD for 95% severe in-stent restenosis, TTE was repeated (off warfarin) found with apical akinesis/aneurysm with calcified mural thrombus.   9/2021-10/2021: patient was living in [place] and in Sept '21 hospitalized for chest pain (no ischemia) and again 10/2021 for acute stroke symptoms (facial droop and left sided weakness)  11/2021: Returned to [place] , recurrent CVAs, had repeat TTE with Definity confirmed recurrence of LV apical thrombus, restarted back on warfarin. Remained on Plavix & Coumadin, no ASA  1/21/2022: Followed up with Dr. Rush. Has chronic angina and feel dizzy after taking medications.       REVIEW OF SYMPTOMS:   CONSTITUTIONAL: No fever, no weight loss, no fatigue, no weight gain   ENMT:  No difficulty hearing, tinnitus, vertigo; No sinus or throat pain  NECK: No pain or stiffness  CARDIOVASCULAR: endorses chest pain left sided and diaphoresis, no dyspnea, no syncope, no palpitations, no dizziness, no Orthopnea, no Paroxsymal nocturnal dyspnea  RESPIRATORY: No Dyspnea on exertion, no Shortness of breath, no cough, no wheezing  : No dysuria, no hematuria   GI: No dark color stool, no melena, no diarrhea, no constipation, no abdominal pain   NEURO: No headache, no dizziness, no slurred speech   MUSCULOSKELETAL: No joint pain or swelling; No muscle, back, or extremity pain  PSYCH: No agitation, no anxiety.    ALL OTHER REVIEW OF SYSTEMS ARE NEGATIVE.      PREVIOUS DIAGNOSTIC TESTING  ECHO FINDINGS:  TTE Nov 2021: EF 40-45%, calcified lesion LV apical wall (0.6cm x 0.7cm) old mural thrombus. mild to mod decreased segmental left ventricular systolic function.       STRESS FINDINGS:      CATHETERIZATION FINDINGS:     < from: Cardiac Cath Lab - Adult (09.23.20 @ 16:48) >  INDICATIONS: Unstable angina - CCS4.  HISTORY: History of CAD with mid to distal LAD stenting. Presented 3 weeks  prior with chest pain and noted to have severe instent restenosis.  Considering small caliber vessel, instent restenosis and need for long  area of intervention, decision was made to medically manage. Patient on 2  anti anginals and continued to have chest pain. Presented to ED with  active symptoms, at rest. Class IV unstable angina, ACS>24 hours.  PROCEDURE:  --  Left coronary angiography.  --  Sonosite.  --  Interventional IVUS.  --  Hemostasis with Angioseal-Intervention.  --  Intervention on distal LAD: drug-eluting stent.  TECHNIQUE: The risks and alternatives of the procedures and conscious  sedation were explained to the patient and informed consent was obtained.  Cardiac catheterization performed electively. Coronary intervention  performed electively.  Local anesthetic given. Right femoral artery access. Left coronary artery  angiography. The vessel was injected utilizing a catheter. Sonosite.  RADIATION EXPOSURE: 13.6 min. A drug-eluting stent was performed on the 95  % lesion in the distal LAD. Following intervention there was a 1 %  residual stenosis. There was no dissection. Balloon angioplasty was  performed, using a EMERGE MR 2.00MM X 30MM balloon, with 3 inflations and  a maximum inflation pressure of 16 jackelin. During the procedure, the previous  guider was changed for a 6F EBU3.5 LAUNCHER guider, and a new WIGGLE 190CM  wire was advanced across the lesion. Intravascular ultrasound was  performed using a(n) EAGLE EYE SHORT TIP catheter over a previously placed  guidewire. Balloon angioplasty was performed, using a RX 2.0 X 15 X 139  PTCA ANGIOSCULPT balloon, with 5 inflations and a maximum inflation  pressure of 16 jackelin. A SYNERGY 2.25MM X 38MM drug-eluting stent was placed  across the lesion and deployed at a maximum inflation pressure of 8 jackelin. A  SYNERGY 2.50MM X 20MM drug-eluting stent was placed across the lesion and  deployed at a maximum inflation pressure of 12 jackelin. Balloon angioplasty  was performed, with 1 inflations and a maximum inflation pressure of 16  jackelin. Balloon angioplasty was performed, using a NC EMERGE 2.50 X 30MM  balloon, with 3 inflations and a maximum inflation pressure of 20 jackelin.  Interventional IVUS. Hemostasis with Angioseal-Intervention.  CONTRAST GIVEN: Omnipaque 7 ml. Omnipaque 116 ml.  MEDICATIONS GIVEN: Midazolam, 1 mg, IV. Fentanyl, 50 mcg, IV. Fentanyl, 50  mcg, IV. Nitroglycerin, 200 mcg, intracoronary. Nitroglycerin, 200 mcg,  intracoronary. Nitroglycerin, 200 mcg, intracoronary. Nitroglycerin, 200  mcg, intracoronary. Nitroglycerin, 200 mcg, intracoronary. Heparin, 6000  units, IV. Clopidogrel (Plavix), 600 mg, PO. 1% Lidocaine, 10 ml,  subcutaneously.  VENTRICLES: No LV gram was performed; however, a recent echocardiogram  demonstrated an EF of 30 %.  CORONARY VESSELS: The coronary circulation is right dominant.  LM:   --  LM: Normal.  LAD:   --  Distal LAD: There was a diffuse 95 % stenosis in-stent.  CX:   --  Proximal circumflex: There was a 20 % stenosis.  RCA:   --  RCA: This vessel was not injected.  COMPLICATIONS: No complications occurred during the cath lab visit.  DIAGNOSTIC IMPRESSIONS: Severe LAD instent restenosis. PCI performed with 2  PEPE.  DIAGNOSTIC RECOMMENDATIONS: Aspirin and plavix.  INTERVENTIONAL IMPRESSIONS: Severe LAD instent restenosis. PCI performed  with 2 PEPE.  INTERVENTIONAL RECOMMENDATIONS: Aspirin and plavix.  Prepared and signed by  Greg Escobar MD  Signed 09/25/2020 07:57:15    < end of copied text >      ALLERGIES: Allergies    No Known Allergies    Intolerances          PAST MEDICAL HISTORY  HTN (hypertension)  MI (myocardial infarction)  Thrombosis  Stented coronary artery  CAD (coronary artery disease)  Heart block  Former smoker  Status post CVA      PAST SURGICAL HISTORY  No significant past surgical history  Cardiac defibrillator in place    FAMILY HISTORY:    SOCIAL HISTORY:  Denies smoking/alcohol/drugs  CIGARETTES:   quit 3 years ago    CURRENT MEDICATIONS:    HOME MEDICATIONS:      Vital Signs Last 24 Hrs  T(C): 36.9 (26 Feb 2022 12:26), Max: 36.9 (26 Feb 2022 12:26)  T(F): 98.5 (26 Feb 2022 12:26), Max: 98.5 (26 Feb 2022 12:26)  HR: 65 (26 Feb 2022 12:26) (65 - 68)  BP: 120/72 (26 Feb 2022 12:26) (120/72 - 134/90)  BP(mean): --  RR: 18 (26 Feb 2022 12:26) (18 - 18)  SpO2: 96% (26 Feb 2022 12:26) (96% - 98%)      PHYSICAL EXAM:  Constitutional: Comfortable . No acute distress.   HEENT: Atraumatic and normocephalic , neck is supple . no JVD. No carotid bruit. PEERL   CNS: A&Ox3. No focal deficits. EOMI. Cranial nerves II-IX are intact.   Respiratory: CTAB, unlabored   Cardiovascular: S1S2 RRR. No murmur/rubs or gallop.  Gastrointestinal: Soft non-tender and non distended . +Bowel sounds. negative Hidalgo's sign.  Extremities: No edema.   Psychiatric: Calm . no agitation.  Skin: No skin rash/ulcers visualized to face, hands or feet.    Intake and output:     LABS:                        15.6   5.52  )-----------( 187      ( 26 Feb 2022 11:27 )             47.1     02-26    140  |  103  |  14.7  ----------------------------<  87  4.5   |  28.0  |  0.95    Ca    8.9      26 Feb 2022 11:27  Mg     2.0     02-26    TPro  7.0  /  Alb  4.4  /  TBili  0.4  /  DBili  x   /  AST  23  /  ALT  21  /  AlkPhos  55  02-26    CARDIAC MARKERS ( 26 Feb 2022 11:27 )  x     / <0.01 ng/mL / x     / x     / x        ;p-BNP=Serum Pro-Brain Natriuretic Peptide: 95 pg/mL (02-26 @ 11:27)    PT/INR - ( 26 Feb 2022 11:58 )   PT: 23.8 sec;   INR: 2.04 ratio         PTT - ( 26 Feb 2022 11:58 )  PTT:36.2 sec      INTERPRETATION OF TELEMETRY: Reviewed by me.   ECG: Reviewed by me. NSR unchanged from previous EKG (Jan 21, 2022)    RADIOLOGY & ADDITIONAL STUDIES:    X-ray:  reviewed by me.   < from: CT Angio Abdomen and Pelvis w/ IV Cont (02.26.22 @ 11:32) >    INTERPRETATION:  Clinical information: Chest pain. Evaluate for aortic   dissection.    CT angiogram of the chest was obtained following administration of   intravenous contrast. Noncontrast images of the chest were also obtained.   Approximately 97 cc of Omnipaque 350 was administered and 3 cc was   discarded.    Nohilar and or mediastinal adenopathy is noted.    Heart is enlarged in size. Focal calcification is noted involving the   apical segment of the left ventricular myocardium suggestive of prior   infarct in this region. Calcification of the coronary arteries is noted.   No pericardial effusion is noted. An AICD is noted in place. Aorta is   normal in caliber. There is no thoracic aortic aneurysm, dissection   and/or intramural hematoma. A probable focal dissection is noted   involving the distal abdominal aorta just above the aortic bifurcation.    No endobronchial lesions are noted. Lungs are clear. No pleural effusions   are noted.    Liver, spleen, pancreas, gallbladder and both adrenal glands are   unremarkable.    Both kidneys enhance with contrast. Cortical scarring is noted within   both kidneys. No hydronephrosis is noted. No retroperitoneal adenopathy   is noted.    Stool is noted throughout the large bowel.    Examination of the pelvis demonstrate no free fluid.    Degenerative changes of the spine are noted.    IMPRESSION: No thoracic aortic dissection and/or intramural hematoma is   noted.    A probable focal dissection is noted involving the distal abdominal aorta   just above the aortic bifurcation.    --- End of Report ---    < end of copied text >

## 2022-02-26 NOTE — CONSULT NOTE ADULT - ASSESSMENT
51M Creole-speaking man with history of CAD with multivessel stenting , MI (2017 at [facility] with restenosis of RCA and LAD s/p PEPE to mRCA), LV apical thrombus (on warfarin), HFrEF (EF 40-45%, Nov2021), syncope with 2:1 heart block s/p Medtronic AICD 5/2018 (DDD ), CVA (6/2019) and with chronic angina presents to with complaints of 1 week of left sided chest pain, without radiation described as burning and currently 5/10, associated with diaphoresis.  CTA C/A/P w/ IV cont reveals a probable focal dissection is noted involving the distal abdominal aorta just above the aortic bifurcation.
52 yo Creole-speaking [place]ese male with a PMH of CAD s/p stents, MI s/p restenosis of RCA and LAD s/o PEPE to mRCA, LV apical thrombus (on warfarin), syncope with 2:1 heart block s/p Medtronic AICD, CVA with chronic angina who is presenting to the ED with a chief complaint of chest pain. The patient reports that he took his medications this morning (Imdur and Ranexa) and was found to be hypotensive this morning with associated chest pain. CTA showed a probable focal dissection is noted involving the distal abdominal aorta just above the aortic bifurcation. Vascular surgery was consulted for further management.     The patient has is stable with palpable distal pulses. Recommend conservative management of probably focal dissection with statin and ASA. Recommend BP control. No acute vascular surgical intervention needed at this time.     Thank you for allowing us to participate in the care of this patient. Please contact us with any further questions.

## 2022-02-26 NOTE — ED PROVIDER NOTE - PROGRESS NOTE DETAILS
Benefits of CT scan outweigh risks of getting contrast dye. will need to rule out emergent , life threatening medical condition. CTA with probable focal dissection.  Vascular consulted.

## 2022-02-26 NOTE — ED PROVIDER NOTE - CLINICAL SUMMARY MEDICAL DECISION MAKING FREE TEXT BOX
52 y/o M hx of CAD w/ 7 stents, MI, LV apical thrombus, HF, syncope w/ 2:1 block, CVA, TIA, on coumadin, unstable angina presents w/ chest discomfort for past 1 day. follows w/ dr. villagran via [place] [facility]. EKG shows q waves and t wave inversions in v5 and v6 which are unchanged from a prior ekg on file in 9/2020. hypotensive episode at home w/ diffuse "numbness." hemodynamically stable now, sabrina ctive chest pain but endorses dizziness.   will r/o dissection, priority CTA chest/abdomen   r/o acs - ekg, trop , consult [facility], followed by dr. villagran s/p troponin result   cardiac monitor , reassess   ct head non-contrast, no focal neuro deficits but endorsing continued dizziness w/ prior hx of TIA

## 2022-02-26 NOTE — CONSULT NOTE ADULT - PROBLEM SELECTOR RECOMMENDATION 9
.  - patient with significant cardiac hx  - patient with chronic  angina unrelieved by antianginals  - EKG unchanged  - repeat TTE  - CTA C/A/P w/ IV cont reveals a probable focal dissection is noted involving the distal abdominal aorta just above the aortic bifurcation.  - [facility] vascular consult for dissection  - trend troponin  - Hold imdur for now - due to hypotension  - Patient took ASA this AM  - restart home meds  - will follow, Plan discussed with Dr. Chavez

## 2022-02-26 NOTE — PATIENT PROFILE ADULT - FALL HARM RISK - UNIVERSAL INTERVENTIONS
Bed in lowest position, wheels locked, appropriate side rails in place/Call bell, personal items and telephone in reach/Instruct patient to call for assistance before getting out of bed or chair/Non-slip footwear when patient is out of bed/[place] to call system/Physically safe environment - no spills, clutter or unnecessary equipment/Purposeful Proactive Rounding/Room/bathroom lighting operational, light cord in reach

## 2022-02-26 NOTE — H&P ADULT - ASSESSMENT
Patient is a 51M w/ hx of CAD w/ multivessel stenting, MI w/ restenosis of RCA and LAD (2017), LV apical thrombus (coumadin), HFrEF (EF 40-45%), syncope with 2:1 heart block s/p Medtronic AICD 5/2018, CVA who presents to the ED w/ LT-sided chest pain found to have focal dissection involving distal abdominal aorta. Now admitted for further workup and management.

## 2022-02-26 NOTE — PATIENT PROFILE ADULT - NSPROPOAURINARYCATHETER_GEN_A_NUR
"Lilian Norton is a 13 year old female who is being evaluated via a billable telephone visit.      The patient has been notified of following:     \"This telephone visit will be conducted via a call between you and your physician/provider. We have found that certain health care needs can be provided without the need for a physical exam.  This service lets us provide the care you need with a short phone conversation.  If a prescription is necessary we can send it directly to your pharmacy.  If lab work is needed we can place an order for that and you can then stop by our lab to have the test done at a later time.    If during the course of the call the physician/provider feels a telephone visit is not appropriate, you will not be charged for this service.\"     Patient has given verbal consent for Telephone visit?  Yes    Lilian Norton complains of    Chief Complaint   Patient presents with     RECHECK     CF follow up       I have reviewed and updated the patient's Past Medical History, Social History, Family History and Medication List.    ALLERGIES  [place] flavor    Additional provider notes: This provider called and spoke with Kerri and her mom Annette.   The last visit was on 1/15/20. As you will recall, Lilian Norton (Ella) is a 12 year old female with cystic fibrosis and pancreatic insufficiency. Since the last visit she reports that she has been doing well. Kerri reports that her mild coughing which may be more of a throat clearing type of cough has continued, but seems better than it has been. Kerri is sleeping well at night with no night time pulmonary symptoms which disrupt her sleep. From a sinus standpoint, Kerri denies any chronic congestion or post nasal drip. Kerri is a very active girl, currently walking the dog, biking or going for outdoor runs with her mom. She has no limitations to her activity due to pulmonary symptoms. She does not use her albuterol as a premedication. Currently Kerri is " "participating in her vest treatments 2-3 times daily. During vest, she will nebulize albuterol and 7% hypertonic saline with each treatment. She uses pulmozyme once a day with pulmonary exacerbations only. Mom prefers not to use this medication routinely. At the last visit we talked about the new CFTR modulator drug Trikafta. Both Kerri and her mom did not wish to pursue starting that medication at that time. This was brought up again today and Kerri and her mom continue to want to hold off on this medication.     From a GI standpoint, Kerri's appetite is stable. She is a picky eater, which is not new for her but often makes it challenging to find foods that she enjoys. She comments that she has \"already eaten all of her quarantine snacks.\" Kerri continues to follow a dairy free diet due to both preference and a history of constipation when she eats dairy. She has not had any nausea, abdominal pain or vomiting. She continues to take her enzymes as prescribed. Currently this is Creon 6000, 14 caps with meals and 7 with snacks. On this regimen she has 1-2 bowel movement per day, normal consistency. She takes her vitamins without difficulty. Kerri had menarche in 9/2019. Kerri continues to notice that several days to one week before she is due to get her period she will have mild pulmonary exacerbation symptoms. These symptoms improve once her period is over, but do seem to consistently occur prior to her period.     Kerri is home schooled and working on 8th grade coursework. She is an excellent student. Kerri reports that from a mental health standpoint she is doing well. Even being on social distancing restrictions and quarantine, she feels a sense of \"relief.\" Kerri states that she has more free time now and feels good about this. She continues to connect with friends via video chats and other forms of virtual connections. She denies symptoms of anxiety or depression.     Assessment:  Cystic fibrosis - (df508/CFTRdele 2,3 " with a Poly T Variant: 7T/9T)  Pancreatic insufficiency    Plan:  Patient Instructions   No changes are recommended today.   Danisha Ceballos, CF dietitian will call you today to check in.   Follow up in 3 months for routine care.       We appreciate the opportunity to be involved in [facility]. If there are any additional questions or concerns regarding this evaluation, please do not hesitate to contact us at any time.     LUIS FELIPE Owusu, CNP  [facility]  Pediatric Pulmonary  Telephone: [phone]      Phone call duration: 15 minutes     no

## 2022-02-26 NOTE — ED PROVIDER NOTE - ATTENDING CONTRIBUTION TO CARE
Patient seen with resident.  iPad  bedside.   present for discussions with patient. VSS.  PMH of CAD with stents and taking coumadin due to reported thrombus.  Also PMH of TIA, CVA, defibrillator.  [facility] is [place].  Patient with sx for one day.  States chest pain and sense of generalized weakness that resolved PTA.  Patient states BP was 80 systolic at the time.  Patient otherwise baseline.  no radiation of pain.  Patient states no pain currently in the ED.  no trauma.  ECG is inf/lat q waves that is similar morphology to 9/2020.  Non toxic.  Well appearing. Patient looks well.  speaking in full sentences.  No aggravating or relieving factors. No other pertinent PMH.  No other pertinent PSH.  No other pertinent FHx.  Patient denies EtOH/tobacco substance use. No fever/chills, No photophobia/eye pain/changes in vision, No ear pain/sore throat/dysphagia, No palpitations, no SOB/cough/wheeze/stridor, No abdominal pain, No N/V/D, no dysuria/frequency/discharge, No neck/back pain, no rash.    Gen: Alert, NAD  Head: NC, AT, PERRL, EOMI, normal lids/conjunctiva  ENT: normal hearing, patent oropharynx without erythema/exudate, uvula midline  Neck: +supple, no tenderness/meningismus/JVD, +Trachea midline  Pulm: Bilateral BS, normal resp effort, no wheeze/stridor/retractions  CV: RRR, no R/G, +dist pulses  Abd: soft, NT/ND, +BS, no hepatosplenomegaly  Mskel: no edema/erythema/cyanosis  Skin: no rash  Neuro: AAOx3, no gross sensory/motor deficits    Patient with chest pain and brief episode of generalized weakness.  Lab values do not require emergent intervention. CTA with possible focal dissection and vascular paged.

## 2022-02-26 NOTE — H&P ADULT - PROBLEM SELECTOR PLAN 1
-  CTA A/P showed no thoracic aortic dissection or intramural hematoma, but was concerning for focal dissection involving distal abdominal aorta  - will monitor vitals Q4H   - Low threshold for ICU consult if HD unstable  - Appreciate vascular surgery recs- will c/w ASA and statin. BP control. No acute vascular surgical intervention -  CTA A/P showed no thoracic aortic dissection or intramural hematoma, but was concerning for focal dissection involving distal abdominal aorta  - will monitor vitals Q4H   - Low threshold for ICU consult if HD unstable  - Appreciate vascular surgery recs- will c/w statin. BP control. No acute vascular surgical intervention

## 2022-02-26 NOTE — H&P ADULT - PROBLEM SELECTOR PLAN 6
- DVT ppx-  - Diet- DASH diet  - Dispo- pending clinical improvement. - DVT ppx-  - Diet- DASH diet  - Dispo- pending clinical improvement.  CODE STATUS- FULL CODE.    Updated patient's cousin, Elza ([phone]) on 2/26. - DVT ppx- Lovenox   - Diet- DASH diet  - Dispo- pending clinical improvement.  CODE STATUS- FULL CODE.    Updated patient's cousin, Elza ([phone]) on 2/26.

## 2022-02-26 NOTE — ED PROVIDER NOTE - PHYSICAL EXAMINATION
General: Well appearing male in no acute distress  HEENT: Normocephalic, atraumatic. Moist mucous membranes. Oropharynx clear. No lymphadenopathy.  Eyes: No scleral icterus. EOMI. NICHO.  Neck:. Soft and supple. Full ROM without pain. No midline tenderness  Cardiac: Regular rate and regular rhythm. No murmurs, rubs, gallops. Peripheral pulses 2+ and symmetric. No LE edema.  Resp: Lungs CTAB. Speaking in full sentences. No wheezes, rales or rhonchi.  Abd: Soft, non-tender, non-distended. No guarding or rebound. No scars, masses, or lesions.  Back: Spine midline and non-tender. No CVA tenderness.    Skin: No rashes, abrasions, or lacerations.  Neuro: AO x 3. Moves all extremities symmetrically. Motor strength and sensation grossly intact.

## 2022-02-26 NOTE — H&P ADULT - PROBLEM SELECTOR PLAN 4
- Patient is on coumadin 3 mg - Patient is on coumadin 3 mg QD  - Appreciate cardiology recs- will switch to Lovenox. If TTE is normal, plan for NST on 2/28. If TTE shows wall motion abnormalities, will plan for cardiac cath.   - will monitor PT/INR daily

## 2022-02-26 NOTE — ED PROVIDER NOTE - OBJECTIVE STATEMENT
52 y/o M hx of CAD w/ 7 stents, MI, LV apical thrombus, HF, syncope w/ 2:1 block, CVA, TIA, on coumadin, unstable angina presents w/ chest discomfort for past 1 day. follows w/ dr. villagran via [place] [facility]. states he was lying down at home earlier today when he checked his BP and it was in the 80s systolic. felt weak and "numb" all over. States that he felt chest discomfort L side of his chest, non-radiating, non-exertional. States currently he feels dizzy with no active chest pain. Took 81mg ASA prior to arrival w/ his isosorbide. Not covid vaccinated per patient.  Denies syncope/LOC. Denies shortness of breath. Denies abdominal pain. Denies fever/chills.

## 2022-02-26 NOTE — H&P ADULT - NSHPREVIEWOFSYSTEMS_GEN_ALL_CORE
CONSTITUTIONAL: denies fever, chills, fatigue, weakness  HEENT: denies blurred vision, sore throat  SKIN: denies new lesions, rash  CARDIOVASCULAR: denies chest pain, chest pressure, palpitations  RESPIRATORY: denies shortness of breath, sputum production  GASTROINTESTINAL: denies nausea, vomiting, diarrhea, abdominal pain  GENITOURINARY: denies dysuria, discharge  NEUROLOGICAL: denies numbness, headache, focal weakness  MUSCULOSKELETAL: denies new joint pain, muscle aches  HEMATOLOGIC: denies gross bleeding, bruising  LYMPHATICS: denies enlarged lymph nodes, extremity swelling  PSYCHIATRIC: denies recent changes in anxiety, depression  ENDOCRINOLOGIC: denies sweating, cold or heat intolerance CONSTITUTIONAL: +diaphoresis, denies fever, chills, fatigue  CARDIOVASCULAR: +chest pain, +chest pressure, denies palpitations  RESPIRATORY: denies shortness of breath, sputum production  GASTROINTESTINAL: denies nausea, vomiting, diarrhea, abdominal pain  GENITOURINARY: denies dysuria, discharge  NEUROLOGICAL: denies numbness, headache, focal weakness  MUSCULOSKELETAL: denies new joint pain, muscle aches  LYMPHATICS: denies enlarged lymph nodes, extremity swelling  PSYCHIATRIC: denies recent changes in anxiety, depression  ENDOCRINOLOGIC: denies sweating, cold or heat intolerance

## 2022-02-26 NOTE — H&P ADULT - NSHPLABSRESULTS_GEN_ALL_CORE
Personally reviewed available labs, imaging and ekg     Labs  CBC Full  -  ( 26 Feb 2022 11:27 )  WBC Count : 5.52 K/uL  RBC Count : 5.09 M/uL  Hemoglobin : 15.6 g/dL  Hematocrit : 47.1 %  Platelet Count - Automated : 187 K/uL  Mean Cell Volume : 92.5 fl  Mean Cell Hemoglobin : 30.6 pg  Mean Cell Hemoglobin Concentration : 33.1 gm/dL  Auto Neutrophil # : 2.89 K/uL  Auto Lymphocyte # : 1.89 K/uL  Auto Monocyte # : 0.66 K/uL  Auto Eosinophil # : 0.04 K/uL  Auto Basophil # : 0.02 K/uL  Auto Neutrophil % : 52.3 %  Auto Lymphocyte % : 34.2 %  Auto Monocyte % : 12.0 %  Auto Eosinophil % : 0.7 %  Auto Basophil % : 0.4 %    02-26    140  |  103  |  14.7  ----------------------------<  87  4.5   |  28.0  |  0.95    Ca    8.9      26 Feb 2022 11:27  Mg     2.0     02-26    TPro  7.0  /  Alb  4.4  /  TBili  0.4  /  DBili  x   /  AST  23  /  ALT  21  /  AlkPhos  55  02-26    PT/INR - ( 26 Feb 2022 11:58 )   PT: 23.8 sec;   INR: 2.04 ratio         PTT - ( 26 Feb 2022 11:58 )  PTT:36.2 sec    CAPILLARY BLOOD GLUCOSE        CARDIAC MARKERS ( 26 Feb 2022 17:05 )  x     / <0.01 ng/mL / x     / x     / x      CARDIAC MARKERS ( 26 Feb 2022 11:27 )  x     / <0.01 ng/mL / x     / x     / x                Imaging    EKG Personally reviewed available labs, imaging and ekg     Labs  CBC Full  -  ( 26 Feb 2022 11:27 )  WBC Count : 5.52 K/uL  RBC Count : 5.09 M/uL  Hemoglobin : 15.6 g/dL  Hematocrit : 47.1 %  Platelet Count - Automated : 187 K/uL  Mean Cell Volume : 92.5 fl  Mean Cell Hemoglobin : 30.6 pg  Mean Cell Hemoglobin Concentration : 33.1 gm/dL  Auto Neutrophil # : 2.89 K/uL  Auto Lymphocyte # : 1.89 K/uL  Auto Monocyte # : 0.66 K/uL  Auto Eosinophil # : 0.04 K/uL  Auto Basophil # : 0.02 K/uL  Auto Neutrophil % : 52.3 %  Auto Lymphocyte % : 34.2 %  Auto Monocyte % : 12.0 %  Auto Eosinophil % : 0.7 %  Auto Basophil % : 0.4 %    02-26    140  |  103  |  14.7  ----------------------------<  87  4.5   |  28.0  |  0.95    Ca    8.9      26 Feb 2022 11:27  Mg     2.0     02-26    TPro  7.0  /  Alb  4.4  /  TBili  0.4  /  DBili  x   /  AST  23  /  ALT  21  /  AlkPhos  55  02-26    PT/INR - ( 26 Feb 2022 11:58 )   PT: 23.8 sec;   INR: 2.04 ratio         PTT - ( 26 Feb 2022 11:58 )  PTT:36.2 sec    CAPILLARY BLOOD GLUCOSE        CARDIAC MARKERS ( 26 Feb 2022 17:05 )  x     / <0.01 ng/mL / x     / x     / x      CARDIAC MARKERS ( 26 Feb 2022 11:27 )  x     / <0.01 ng/mL / x     / x     / x                Imaging  < from: CT Angio Abdomen and Pelvis w/ IV Cont (02.26.22 @ 11:32) >    IMPRESSION: No thoracic aortic dissection and/or intramural hematoma is   noted.    A probable focal dissection is noted involving the distal abdominal aorta   just above the aortic bifurcation.    --- End of Report ---            BEN ALMANZAR MD; Attending Radiologist  This document has been electronically signed. Feb 26 2022 12:02PM    < end of copied text >          EKG- Personally reviewed available labs, imaging and ekg     Labs  CBC Full  -  ( 26 Feb 2022 11:27 )  WBC Count : 5.52 K/uL  RBC Count : 5.09 M/uL  Hemoglobin : 15.6 g/dL  Hematocrit : 47.1 %  Platelet Count - Automated : 187 K/uL  Mean Cell Volume : 92.5 fl  Mean Cell Hemoglobin : 30.6 pg  Mean Cell Hemoglobin Concentration : 33.1 gm/dL  Auto Neutrophil # : 2.89 K/uL  Auto Lymphocyte # : 1.89 K/uL  Auto Monocyte # : 0.66 K/uL  Auto Eosinophil # : 0.04 K/uL  Auto Basophil # : 0.02 K/uL  Auto Neutrophil % : 52.3 %  Auto Lymphocyte % : 34.2 %  Auto Monocyte % : 12.0 %  Auto Eosinophil % : 0.7 %  Auto Basophil % : 0.4 %    02-26    140  |  103  |  14.7  ----------------------------<  87  4.5   |  28.0  |  0.95    Ca    8.9      26 Feb 2022 11:27  Mg     2.0     02-26    TPro  7.0  /  Alb  4.4  /  TBili  0.4  /  DBili  x   /  AST  23  /  ALT  21  /  AlkPhos  55  02-26    PT/INR - ( 26 Feb 2022 11:58 )   PT: 23.8 sec;   INR: 2.04 ratio         PTT - ( 26 Feb 2022 11:58 )  PTT:36.2 sec    CAPILLARY BLOOD GLUCOSE        CARDIAC MARKERS ( 26 Feb 2022 17:05 )  x     / <0.01 ng/mL / x     / x     / x      CARDIAC MARKERS ( 26 Feb 2022 11:27 )  x     / <0.01 ng/mL / x     / x     / x                Imaging  < from: CT Angio Abdomen and Pelvis w/ IV Cont (02.26.22 @ 11:32) >    IMPRESSION: No thoracic aortic dissection and/or intramural hematoma is   noted.    A probable focal dissection is noted involving the distal abdominal aorta   just above the aortic bifurcation.    --- End of Report ---            BEN ALMANZAR MD; Attending Radiologist  This document has been electronically signed. Feb 26 2022 12:02PM    < end of copied text >          EKG- NSR. Unchanged from prior EKG

## 2022-02-26 NOTE — H&P ADULT - NSHPSOCIALHISTORY_GEN_ALL_CORE
Patient lives with his cousin and aunt. Independent with ADLs. Quit smoking cigar 3 years ago. No alcohol history.

## 2022-02-26 NOTE — H&P ADULT - NSHPPHYSICALEXAM_GEN_ALL_CORE
Vital Signs Last 24 Hrs  T(F): 98.3 (26 Feb 2022 15:33), Max: 98.5 (26 Feb 2022 12:26)  HR: 62 (26 Feb 2022 15:33) (62 - 68)  BP: 124/70 (26 Feb 2022 15:33) (120/72 - 134/90)  RR: 18 (26 Feb 2022 15:33) (18 - 18)  SpO2: 99% (26 Feb 2022 15:33) (96% - 99%)    Physical Exam:  Constitutional: NAD, awake and alert, well-developed  Neck: Soft and supple, No LAD, No JVD  Respiratory: cta b/l no wheezing no rhonchi  Cardiovascular: +s1/s2 no edema b/l le  Gastrointestinal: soft nt nd bs+  Vascular: 2+ peripheral pulses  Neurological: A/O x 3, no focal deficits  Musculoskeletal: 5/5 strength b/l upper and lower extremities  Skin:  No obvious pathologic skin lesions   Hematologic / Lymph / Immunologic: No bleeding from IV sites or wounds, No lymphadenopathy, No Hives or allergic type skin lesions

## 2022-02-26 NOTE — CONSULT NOTE ADULT - ATTENDING COMMENTS
Patient seen and examined  51 year old man with HTN, CAD and a previous heavy smoker, admitted with chest pain.  CTA demonstrates focal dissection of the infrarenal aorta. There is no aneurysmal degeneration  Femoral pulses are easily palpable.  As his dissection is focal, non flow-limiting and without aneurysmal degeneration, no intervention is warranted  Continue antiplatelet and statin therapy as well as BP control  May follow up with me as outpatient
51M Creole-speaking man with history of CAD with multivessel stenting , MI (2017 at [facility] with restenosis of RCA and LAD s/p PEPE to mRCA), LV apical thrombus (on warfarin), HFrEF (EF 40-45%, Nov2021), syncope with 2:1 heart block s/p Medtronic AICD 5/2018 (DDD ), CVA (6/2019) and with chronic angina presents to with complaints of 1 week of left sided chest pain, without radiation described as burning and currently 5/10, associated with diaphoresis.  CTA C/A/P w/ IV cont reveals a probable focal dissection is noted involving the distal abdominal aorta just above the aortic bifurcation.    Above note reviewed.    I saw patient in  echo lab, no chest pain.    Exam: Chest- Bilateral clear BS  Cardiac- Normal S1 and S2.    Imp  1. Chronic angina  2. Abdominal aortic focal dissection- needs Vascular evaluation.  Rec:  1. Continue all of his home meds except imdur  2. Will follow echo and trops

## 2022-02-26 NOTE — H&P ADULT - NSICDXPASTMEDICALHX_GEN_ALL_CORE_FT
PAST MEDICAL HISTORY:  CAD (coronary artery disease)     Former smoker     Heart block 2:1 HB   dual chamber AICD (DDD ) in May 2018 [facility] (Dr. Crook David)    HTN (hypertension)     MI (myocardial infarction) 2 coronary stents    Status post CVA residual rt. lower ext weakness.    Stented coronary artery     Thrombosis apical

## 2022-02-26 NOTE — H&P ADULT - NSICDXPASTSURGICALHX_GEN_ALL_CORE_FT
PAST SURGICAL HISTORY:  Cardiac defibrillator in place 2017  Medtronic Visa AF MRI VR Surescan ICD   Model# [id number], SN# [id number]

## 2022-02-26 NOTE — CONSULT NOTE ADULT - SUBJECTIVE AND OBJECTIVE BOX
ACUTE CARE SURGERY CONSULT    HPI:    52 yo Creole-speaking [place] male with a PMH of CAD s/p stents, MI s/p restenosis of RCA and LAD s/o PEPE to mRCA, LV apical thrombus (on warfarin), syncope with 2:1 heart block s/p Medtronic AICD, CVA with chronic angina who is presenting to the ED with a chief complaint of chest pain. The patient reports that he took his medications this morning (Imdur and Ranexa) and was found to be hypotensive this morning with associated chest pain. He came to the hospital for further management.     In the ED, labs were notable for an INR of 2.04. CTA showed a probable focal dissection is noted involving the distal abdominal aorta just above the aortic bifurcation. Vascular surgery was consulted for further management.     PAST MEDICAL HISTORY:  HTN (hypertension)    MI (myocardial infarction)    Thrombosis    Stented coronary artery    CAD (coronary artery disease)    Heart block    Former smoker    Status post CVA        PAST SURGICAL HISTORY:  No significant past surgical history    Cardiac defibrillator in place        ALLERGIES:  No Known Allergies      FAMILY HISTORY: Noncontributory    SOCIAL HISTORY: Denies tobacco, EtOH, illicit substance use.     HOME MEDICATIONS:    MEDICATIONS  (STANDING):    MEDICATIONS  (PRN):      VITALS & I/Os:  Vital Signs Last 24 Hrs  T(C): 36.8 (26 Feb 2022 15:33), Max: 36.9 (26 Feb 2022 12:26)  T(F): 98.3 (26 Feb 2022 15:33), Max: 98.5 (26 Feb 2022 12:26)  HR: 62 (26 Feb 2022 15:33) (62 - 68)  BP: 124/70 (26 Feb 2022 15:33) (120/72 - 134/90)  BP(mean): --  RR: 18 (26 Feb 2022 15:33) (18 - 18)  SpO2: 99% (26 Feb 2022 15:33) (96% - 99%)  CAPILLARY BLOOD GLUCOSE          I&O's Summary      GENERAL: In no acute distress.  MENTAL STATUS: AAOx3. Appropriate affect.  HEENT: PERRLA. EOMI. MMM.  Trachea midline. No lymph node swelling or tenderness.  RESPIRATORY: CTAB. No wheezing, rales or rhonchi.  CARDIOVASCULAR: RRR. No audible murmurs, rubs or gallops.   GASTROINTESTINAL: Abdomen soft, NT, ND, -R/-G.  No pulsatile mass, no flank tenderness or suprapubic tenderness. No hepatosplenomegaly.  VASCULAR: Palpable DP pulses bilaterally  INTEGUMENTARY: No overt rashes or lesions, petechia or purpura. Good turgor. No edema.  MUSCULOSKELETAL: No cyanosis or clubbing. No gross deformities.   LYMPHATIC: Palpation of neck reveals no swelling or tenderness of neck nodes. Palpation of groin reveals no swelling or tenderness of groin nodes.    LABS:                        15.6   5.52  )-----------( 187      ( 26 Feb 2022 11:27 )             47.1     02-26    140  |  103  |  14.7  ----------------------------<  87  4.5   |  28.0  |  0.95    Ca    8.9      26 Feb 2022 11:27  Mg     2.0     02-26    TPro  7.0  /  Alb  4.4  /  TBili  0.4  /  DBili  x   /  AST  23  /  ALT  21  /  AlkPhos  55  02-26    Lactate:    PT/INR - ( 26 Feb 2022 11:58 )   PT: 23.8 sec;   INR: 2.04 ratio         PTT - ( 26 Feb 2022 11:58 )  PTT:36.2 sec    CARDIAC MARKERS ( 26 Feb 2022 11:27 )  x     / <0.01 ng/mL / x     / x     / x            IMAGING:      ACC: [id number] EXAM:  CT ANGIO ABD PELV (W)AW IC                        ACC: [id number] EXAM:  CT ANGIO CHEST AORTA [facility]                          PROCEDURE DATE:  02/26/2022          INTERPRETATION:  Clinical information: Chest pain. Evaluate for aortic   dissection.    CT angiogram of the chest was obtained following administration of   intravenous contrast. Noncontrast images of the chest were also obtained.   Approximately 97 cc of Omnipaque 350 was administered and 3 cc was   discarded.    No hilar and or mediastinal adenopathy is noted.    Heart is enlarged in size. Focal calcification is noted involving the   apical segment of the left ventricular myocardium suggestive of prior   infarct in this region. Calcification of the coronary arteries is noted.   No pericardial effusion is noted. An AICD is noted in place. Aorta is   normal in caliber. There is no thoracic aortic aneurysm, dissection   and/or intramural hematoma. A probable focal dissection is noted   involving the distal abdominal aorta just above the aortic bifurcation.    No endobronchial lesions are noted. Lungs are clear. No pleural effusions   are noted.    Liver, spleen, pancreas, gallbladder and both adrenal glands are   unremarkable.    Both kidneys enhance with contrast. Cortical scarring is noted within   both kidneys. No hydronephrosis is noted. No retroperitoneal adenopathy   is noted.    Stool is noted throughout the large bowel.    Examination of the pelvis demonstrate no free fluid.    Degenerative changes of the spine are noted.    IMPRESSION: No thoracic aortic dissection and/or intramural hematoma is   noted.    A probable focal dissection is noted involving the distal abdominal aorta   just above the aortic bifurcation.    --- End of Report ---            BEN ALMANZAR MD; Attending Radiologist  This document has been electronically signed. Feb 26 2022 12:02PM

## 2022-02-27 LAB
ALBUMIN SERPL ELPH-MCNC: 4.3 G/DL — SIGNIFICANT CHANGE UP (ref 3.3–5.2)
ALP SERPL-CCNC: 57 U/L — SIGNIFICANT CHANGE UP (ref 40–120)
ALT FLD-CCNC: 24 U/L — SIGNIFICANT CHANGE UP
ANION GAP SERPL CALC-SCNC: 11 MMOL/L — SIGNIFICANT CHANGE UP (ref 5–17)
AST SERPL-CCNC: 23 U/L — SIGNIFICANT CHANGE UP
BILIRUB SERPL-MCNC: 0.7 MG/DL — SIGNIFICANT CHANGE UP (ref 0.4–2)
BUN SERPL-MCNC: 14.2 MG/DL — SIGNIFICANT CHANGE UP (ref 8–20)
CALCIUM SERPL-MCNC: 8.8 MG/DL — SIGNIFICANT CHANGE UP (ref 8.6–10.2)
CHLORIDE SERPL-SCNC: 100 MMOL/L — SIGNIFICANT CHANGE UP (ref 98–107)
CO2 SERPL-SCNC: 27 MMOL/L — SIGNIFICANT CHANGE UP (ref 22–29)
CREAT SERPL-MCNC: 0.97 MG/DL — SIGNIFICANT CHANGE UP (ref 0.5–1.3)
GLUCOSE SERPL-MCNC: 95 MG/DL — SIGNIFICANT CHANGE UP (ref 70–99)
HCT VFR BLD CALC: 47 % — SIGNIFICANT CHANGE UP (ref 39–50)
HGB BLD-MCNC: 15.3 G/DL — SIGNIFICANT CHANGE UP (ref 13–17)
INR BLD: 2.16 RATIO — HIGH (ref 0.88–1.16)
MAGNESIUM SERPL-MCNC: 1.9 MG/DL — SIGNIFICANT CHANGE UP (ref 1.6–2.6)
MCHC RBC-ENTMCNC: 29.9 PG — SIGNIFICANT CHANGE UP (ref 27–34)
MCHC RBC-ENTMCNC: 32.6 GM/DL — SIGNIFICANT CHANGE UP (ref 32–36)
MCV RBC AUTO: 92 FL — SIGNIFICANT CHANGE UP (ref 80–100)
PHOSPHATE SERPL-MCNC: 2.6 MG/DL — SIGNIFICANT CHANGE UP (ref 2.4–4.7)
PLATELET # BLD AUTO: 207 K/UL — SIGNIFICANT CHANGE UP (ref 150–400)
POTASSIUM SERPL-MCNC: 3.6 MMOL/L — SIGNIFICANT CHANGE UP (ref 3.5–5.3)
POTASSIUM SERPL-SCNC: 3.6 MMOL/L — SIGNIFICANT CHANGE UP (ref 3.5–5.3)
PROT SERPL-MCNC: 7 G/DL — SIGNIFICANT CHANGE UP (ref 6.6–8.7)
PROTHROM AB SERPL-ACNC: 25.2 SEC — HIGH (ref 10.5–13.4)
RBC # BLD: 5.11 M/UL — SIGNIFICANT CHANGE UP (ref 4.2–5.8)
RBC # FLD: 12.4 % — SIGNIFICANT CHANGE UP (ref 10.3–14.5)
SODIUM SERPL-SCNC: 138 MMOL/L — SIGNIFICANT CHANGE UP (ref 135–145)
WBC # BLD: 5.17 K/UL — SIGNIFICANT CHANGE UP (ref 3.8–10.5)
WBC # FLD AUTO: 5.17 K/UL — SIGNIFICANT CHANGE UP (ref 3.8–10.5)

## 2022-02-27 PROCEDURE — 99233 SBSQ HOSP IP/OBS HIGH 50: CPT

## 2022-02-27 PROCEDURE — 99232 SBSQ HOSP IP/OBS MODERATE 35: CPT

## 2022-02-27 RX ORDER — GABAPENTIN 400 MG/1
100 CAPSULE ORAL AT BEDTIME
Refills: 0 | Status: DISCONTINUED | OUTPATIENT
Start: 2022-02-27 | End: 2022-02-28

## 2022-02-27 RX ORDER — ENOXAPARIN SODIUM 100 MG/ML
90 INJECTION SUBCUTANEOUS
Refills: 0 | Status: COMPLETED | OUTPATIENT
Start: 2022-02-27 | End: 2022-02-27

## 2022-02-27 RX ADMIN — CARVEDILOL PHOSPHATE 12.5 MILLIGRAM(S): 80 CAPSULE, EXTENDED RELEASE ORAL at 17:19

## 2022-02-27 RX ADMIN — SPIRONOLACTONE 25 MILLIGRAM(S): 25 TABLET, FILM COATED ORAL at 06:43

## 2022-02-27 RX ADMIN — ENOXAPARIN SODIUM 90 MILLIGRAM(S): 100 INJECTION SUBCUTANEOUS at 06:42

## 2022-02-27 RX ADMIN — CLOPIDOGREL BISULFATE 75 MILLIGRAM(S): 75 TABLET, FILM COATED ORAL at 12:43

## 2022-02-27 RX ADMIN — GABAPENTIN 100 MILLIGRAM(S): 400 CAPSULE ORAL at 21:08

## 2022-02-27 RX ADMIN — ENOXAPARIN SODIUM 90 MILLIGRAM(S): 100 INJECTION SUBCUTANEOUS at 17:19

## 2022-02-27 RX ADMIN — RANOLAZINE 1000 MILLIGRAM(S): 500 TABLET, FILM COATED, EXTENDED RELEASE ORAL at 17:19

## 2022-02-27 RX ADMIN — LISINOPRIL 20 MILLIGRAM(S): 2.5 TABLET ORAL at 06:43

## 2022-02-27 RX ADMIN — ATORVASTATIN CALCIUM 80 MILLIGRAM(S): 80 TABLET, FILM COATED ORAL at 21:08

## 2022-02-27 RX ADMIN — CARVEDILOL PHOSPHATE 12.5 MILLIGRAM(S): 80 CAPSULE, EXTENDED RELEASE ORAL at 06:43

## 2022-02-27 RX ADMIN — RANOLAZINE 1000 MILLIGRAM(S): 500 TABLET, FILM COATED, EXTENDED RELEASE ORAL at 06:43

## 2022-02-27 NOTE — PROGRESS NOTE ADULT - PROBLEM SELECTOR PLAN 1
-  CTA A/P showed no thoracic aortic dissection or intramural hematoma, but was concerning for focal dissection involving distal abdominal aorta  - will monitor vitals Q4H   - Low threshold for ICU consult if HD unstable  - Appreciate vascular surgery recs- will c/w statin. BP control. No acute vascular surgical intervention

## 2022-02-27 NOTE — PROGRESS NOTE ADULT - PROBLEM SELECTOR PLAN 4
- Patient is on coumadin 3 mg QD  - Appreciate cardiology recs- will switch to Lovenox. If TTE is normal, plan for NST on 2/28. If TTE shows wall motion abnormalities, will plan for cardiac cath.   - will monitor PT/INR daily

## 2022-02-27 NOTE — PHYSICAL THERAPY INITIAL EVALUATION ADULT - MANUAL MUSCLE TESTING RESULTS, REHAB EVAL
right hip 3+/5, right knee 4-/5, right ankle 4/5, left LE 5/5 left UE 5/5, right shoulder 4/5, right elbow/wrist 5/5

## 2022-02-27 NOTE — PHYSICAL THERAPY INITIAL EVALUATION ADULT - ADDITIONAL COMMENTS
Patient lives in a prviate home with 10 GORDON + right HR with aunt and cousins, someone is always home and able to assist. Patient uses no DME in home, has [employer] for outdoor use, denies owning other DME

## 2022-02-27 NOTE — PROGRESS NOTE ADULT - PROBLEM SELECTOR PLAN 2
- will c/w coreg 12.5 mg BID
.  - CTA C/A/P w/ IV cont reveals a probable focal dissection is noted involving the distal abdominal aorta just above the aortic bifurcation.  - appreciate vascular consult  - No acute vascular surgical intervention needed at this time.  - per vascular reccs - conservative management with ASA and statin  - continue BP management

## 2022-02-27 NOTE — PROGRESS NOTE ADULT - PROBLEM SELECTOR PLAN 1
.  - chronic anginal symptoms  - patient denies chest pain today  - Normotensive today after 1L  fluid bolus yesterday  - continue to hold Imdur secondary to hypotension  - continue coreg 12.5mg  - continue Plavix 75mg   - hold coumadin for potential procedures  - lovenox 90mg SC for DVT prophylaxis  - continue lisinopril 20mg PO daily  - continue ranexa 1000mg PO BID  - will discuss with patient potential need for LHC possibly tomorrow .  - chronic anginal symptoms  - patient denies chest pain today  - Normotensive today after 1L  fluid bolus yesterday  - continue to hold Imdur secondary to hypotension  - continue coreg 12.5mg  - continue Plavix 75mg   - hold coumadin for potential procedures  - lovenox 90mg SC for DVT prophylaxis  - continue lisinopril 20mg PO daily  - continue ranexa 1000mg PO BID  - NPO for [facility] tomorrow for evaluation of CAD

## 2022-02-27 NOTE — PROGRESS NOTE ADULT - PROBLEM SELECTOR PLAN 3
- Likely chronic. Will hold off nitroglycerin given hypotension  - c/w plavix and ranolazine 1000 mg BID  - F/u repeat TTE  - Plan for cardiac cath on 2/27
.  - continue spironolactone 25mg   - euvolemic on exam

## 2022-02-27 NOTE — PROGRESS NOTE ADULT - PROBLEM SELECTOR PLAN 6
- DVT ppx- Lovenox. Will hold after MN  - Diet- DASH diet. NPO after MN.   - Dispo- pending clinical improvement.  CODE STATUS- FULL CODE.    Updated patient's cousin, Elza ([phone]) on 2/27.

## 2022-02-27 NOTE — PHYSICAL THERAPY INITIAL EVALUATION ADULT - PERTINENT HX OF CURRENT PROBLEM, REHAB EVAL
Patient is a 51M w/ hx of CAD w/ multivessel stenting, MI w/ restenosis of RCA and LAD (2017), LV apical thrombus (coumadin), syncope with 2:1 heart block s/p Medtronic AICD 5/2018, CVA who presents to the ED w/ Left sided chest pain. Found to have aortic dissection

## 2022-02-27 NOTE — PROGRESS NOTE ADULT - PROBLEM SELECTOR PLAN 5
- will hold Imdur given concern for hypotension  - c/w spironolactone 25 mg QD and lisinopril 20 mg QD  - F/u TTE

## 2022-02-27 NOTE — PROGRESS NOTE ADULT - ASSESSMENT
51M Creole-speaking man with history of CAD with multivessel stenting , MI (2017 at [facility] with restenosis of RCA and LAD s/p PEPE to mRCA), LV apical thrombus (on warfarin), HFrEF (EF 40-45%, Nov2021), syncope with 2:1 heart block s/p Medtronic AICD 5/2018 (DDD ), CVA (6/2019) and with chronic angina presents to with complaints of 1 week of left sided chest pain, without radiation described as burning and currently 5/10, associated with diaphoresis.    Patient was seen by vascular sx, no  No acute vascular surgical intervention needed at this time. Patient reports no chest pain at this time.

## 2022-02-27 NOTE — PHYSICAL THERAPY INITIAL EVALUATION ADULT - ADL SKILLS, REHAB EVAL
independent Patient unable to provide social history due to decreased cognition. Per care coordination assessment, patient lives with her sister and niece in 2 family house. Patient has home health aide services 10 hrs/day, 5 days per week and 8 hrs/day 2 days per week.

## 2022-02-27 NOTE — PROGRESS NOTE ADULT - ATTENDING COMMENTS
51M Creole-speaking man with history of CAD with multivessel stenting , MI (2017 at [facility] with restenosis of RCA and LAD s/p PEPE to mRCA), LV apical thrombus (on warfarin), HFrEF (EF 40-45%, Nov2021), syncope with 2:1 heart block s/p Medtronic AICD 5/2018 (DDD ), CVA (6/2019) and with chronic angina presents to with complaints of 1 week of left sided chest pain, without radiation described as burning and currently 5/10, associated with diaphoresis.  CTA C/A/P w/ IV cont reveals a probable focal dissection is noted involving the distal abdominal aorta just above the aortic bifurcation.    Above note reviewed.    I saw patient in  echo lab, no chest pain.    Exam: Chest- Bilateral clear BS  Cardiac- Normal S1 and S2.    Imp  1. Chronic angina  2. Abdominal aortic focal dissection- Seen by Vascular Surgery.  Rec:  1. Continue all of his home meds except imdur 51M Creole-speaking man with history of CAD with multivessel stenting , MI (2017 at [facility] with restenosis of RCA and LAD s/p PEPE to mRCA), LV apical thrombus (on warfarin), HFrEF (EF 40-45%, Nov2021), syncope with 2:1 heart block s/p Medtronic AICD 5/2018 (DDD ), CVA (6/2019) and with chronic angina presents to with complaints of 1 week of left sided chest pain, without radiation described as burning and currently 5/10, associated with diaphoresis.  CTA C/A/P w/ IV cont reveals a probable focal dissection is noted involving the distal abdominal aorta just above the aortic bifurcation.    Above note reviewed.    I saw patient in  echo lab, no chest pain.    Exam: Chest- Bilateral clear BS  Cardiac- Normal S1 and S2.    Trops are negative.  Patients echo results noted.    Imp  1. Chronic angina  2. Abdominal aortic focal dissection- Seen by Vascular Surgery.  3. LV apical thrombus    Rec:  1. Continue all of his home meds except imdur  2. For [facility] on 2/28/2022 if INR is acceptalbe.  3. Patients Coumadin is on Hold, On SQ Lovenox which is supposed to be held in AM on 2/8/2022

## 2022-02-27 NOTE — PHYSICAL THERAPY INITIAL EVALUATION ADULT - PATIENT/FAMILY/SIGNIFICANT OTHER GOALS STATEMENT, PT EVAL
Patient primarily American Creole speaking, declined , able to  make needs known and understand English

## 2022-02-28 ENCOUNTER — TRANSCRIPTION ENCOUNTER (OUTPATIENT)
Age: 52
End: 2022-02-28

## 2022-02-28 VITALS
DIASTOLIC BLOOD PRESSURE: 82 MMHG | OXYGEN SATURATION: 99 % | RESPIRATION RATE: 18 BRPM | HEART RATE: 60 BPM | TEMPERATURE: 98 F | SYSTOLIC BLOOD PRESSURE: 120 MMHG

## 2022-02-28 DIAGNOSIS — I20.2: ICD-10-CM

## 2022-02-28 LAB
ALBUMIN SERPL ELPH-MCNC: 4.1 G/DL — SIGNIFICANT CHANGE UP (ref 3.3–5.2)
ALP SERPL-CCNC: 55 U/L — SIGNIFICANT CHANGE UP (ref 40–120)
ALT FLD-CCNC: 20 U/L — SIGNIFICANT CHANGE UP
ANION GAP SERPL CALC-SCNC: 9 MMOL/L — SIGNIFICANT CHANGE UP (ref 5–17)
AST SERPL-CCNC: 19 U/L — SIGNIFICANT CHANGE UP
BILIRUB SERPL-MCNC: 0.7 MG/DL — SIGNIFICANT CHANGE UP (ref 0.4–2)
BUN SERPL-MCNC: 17 MG/DL — SIGNIFICANT CHANGE UP (ref 8–20)
CALCIUM SERPL-MCNC: 8.9 MG/DL — SIGNIFICANT CHANGE UP (ref 8.6–10.2)
CHLORIDE SERPL-SCNC: 100 MMOL/L — SIGNIFICANT CHANGE UP (ref 98–107)
CO2 SERPL-SCNC: 28 MMOL/L — SIGNIFICANT CHANGE UP (ref 22–29)
CREAT SERPL-MCNC: 1.01 MG/DL — SIGNIFICANT CHANGE UP (ref 0.5–1.3)
GLUCOSE SERPL-MCNC: 94 MG/DL — SIGNIFICANT CHANGE UP (ref 70–99)
HCT VFR BLD CALC: 47.1 % — SIGNIFICANT CHANGE UP (ref 39–50)
HGB BLD-MCNC: 15.4 G/DL — SIGNIFICANT CHANGE UP (ref 13–17)
INR BLD: 1.73 RATIO — HIGH (ref 0.88–1.16)
MCHC RBC-ENTMCNC: 30.3 PG — SIGNIFICANT CHANGE UP (ref 27–34)
MCHC RBC-ENTMCNC: 32.7 GM/DL — SIGNIFICANT CHANGE UP (ref 32–36)
MCV RBC AUTO: 92.7 FL — SIGNIFICANT CHANGE UP (ref 80–100)
PLATELET # BLD AUTO: 189 K/UL — SIGNIFICANT CHANGE UP (ref 150–400)
POTASSIUM SERPL-MCNC: 3.9 MMOL/L — SIGNIFICANT CHANGE UP (ref 3.5–5.3)
POTASSIUM SERPL-SCNC: 3.9 MMOL/L — SIGNIFICANT CHANGE UP (ref 3.5–5.3)
PROT SERPL-MCNC: 6.8 G/DL — SIGNIFICANT CHANGE UP (ref 6.6–8.7)
PROTHROM AB SERPL-ACNC: 20.2 SEC — HIGH (ref 10.5–13.4)
RBC # BLD: 5.08 M/UL — SIGNIFICANT CHANGE UP (ref 4.2–5.8)
RBC # FLD: 12.4 % — SIGNIFICANT CHANGE UP (ref 10.3–14.5)
SODIUM SERPL-SCNC: 137 MMOL/L — SIGNIFICANT CHANGE UP (ref 135–145)
WBC # BLD: 5.11 K/UL — SIGNIFICANT CHANGE UP (ref 3.8–10.5)
WBC # FLD AUTO: 5.11 K/UL — SIGNIFICANT CHANGE UP (ref 3.8–10.5)

## 2022-02-28 PROCEDURE — 99152 MOD SED SAME PHYS/QHP 5/>YRS: CPT

## 2022-02-28 PROCEDURE — 71275 CT ANGIOGRAPHY CHEST: CPT | Mod: MG

## 2022-02-28 PROCEDURE — 83735 ASSAY OF MAGNESIUM: CPT

## 2022-02-28 PROCEDURE — 85610 PROTHROMBIN TIME: CPT

## 2022-02-28 PROCEDURE — 74174 CTA ABD&PLVS W/CONTRAST: CPT | Mod: MG

## 2022-02-28 PROCEDURE — 83880 ASSAY OF NATRIURETIC PEPTIDE: CPT

## 2022-02-28 PROCEDURE — 93454 CORONARY ARTERY ANGIO S&I: CPT

## 2022-02-28 PROCEDURE — U0005: CPT

## 2022-02-28 PROCEDURE — 84484 ASSAY OF TROPONIN QUANT: CPT

## 2022-02-28 PROCEDURE — C8929: CPT

## 2022-02-28 PROCEDURE — 80053 COMPREHEN METABOLIC PANEL: CPT

## 2022-02-28 PROCEDURE — G1004: CPT

## 2022-02-28 PROCEDURE — C1894: CPT

## 2022-02-28 PROCEDURE — 85730 THROMBOPLASTIN TIME PARTIAL: CPT

## 2022-02-28 PROCEDURE — 86901 BLOOD TYPING SEROLOGIC RH(D): CPT

## 2022-02-28 PROCEDURE — C1769: CPT

## 2022-02-28 PROCEDURE — 71045 X-RAY EXAM CHEST 1 VIEW: CPT

## 2022-02-28 PROCEDURE — 84100 ASSAY OF PHOSPHORUS: CPT

## 2022-02-28 PROCEDURE — 86900 BLOOD TYPING SEROLOGIC ABO: CPT

## 2022-02-28 PROCEDURE — 99239 HOSP IP/OBS DSCHRG MGMT >30: CPT

## 2022-02-28 PROCEDURE — U0003: CPT

## 2022-02-28 PROCEDURE — C1887: CPT

## 2022-02-28 PROCEDURE — 99234 HOSP IP/OBS SM DT SF/LOW 45: CPT

## 2022-02-28 PROCEDURE — 85027 COMPLETE CBC AUTOMATED: CPT

## 2022-02-28 PROCEDURE — 86850 RBC ANTIBODY SCREEN: CPT

## 2022-02-28 PROCEDURE — 36415 COLL VENOUS BLD VENIPUNCTURE: CPT

## 2022-02-28 PROCEDURE — 93454 CORONARY ARTERY ANGIO S&I: CPT | Mod: 26

## 2022-02-28 PROCEDURE — 93005 ELECTROCARDIOGRAM TRACING: CPT

## 2022-02-28 PROCEDURE — 97163 PT EVAL HIGH COMPLEX 45 MIN: CPT

## 2022-02-28 PROCEDURE — 85025 COMPLETE CBC W/AUTO DIFF WBC: CPT

## 2022-02-28 PROCEDURE — 70450 CT HEAD/BRAIN W/O DYE: CPT | Mod: MG

## 2022-02-28 PROCEDURE — 99285 EMERGENCY DEPT VISIT HI MDM: CPT | Mod: 25

## 2022-02-28 RX ORDER — RANOLAZINE 500 MG/1
2 TABLET, FILM COATED, EXTENDED RELEASE ORAL
Qty: 0 | Refills: 0 | DISCHARGE

## 2022-02-28 RX ORDER — ASPIRIN/CALCIUM CARB/MAGNESIUM 324 MG
81 TABLET ORAL ONCE
Refills: 0 | Status: COMPLETED | OUTPATIENT
Start: 2022-02-28 | End: 2022-02-28

## 2022-02-28 RX ORDER — WARFARIN SODIUM 2.5 MG/1
3 TABLET ORAL ONCE
Refills: 0 | Status: DISCONTINUED | OUTPATIENT
Start: 2022-02-28 | End: 2022-02-28

## 2022-02-28 RX ORDER — NITROGLYCERIN 6.5 MG
0 CAPSULE, EXTENDED RELEASE ORAL
Qty: 0 | Refills: 0 | DISCHARGE

## 2022-02-28 RX ORDER — RANOLAZINE 500 MG/1
1 TABLET, FILM COATED, EXTENDED RELEASE ORAL
Qty: 60 | Refills: 0
Start: 2022-02-28 | End: 2022-03-29

## 2022-02-28 RX ADMIN — CLOPIDOGREL BISULFATE 75 MILLIGRAM(S): 75 TABLET, FILM COATED ORAL at 06:39

## 2022-02-28 RX ADMIN — CARVEDILOL PHOSPHATE 12.5 MILLIGRAM(S): 80 CAPSULE, EXTENDED RELEASE ORAL at 05:04

## 2022-02-28 RX ADMIN — Medication 81 MILLIGRAM(S): at 07:45

## 2022-02-28 RX ADMIN — RANOLAZINE 1000 MILLIGRAM(S): 500 TABLET, FILM COATED, EXTENDED RELEASE ORAL at 05:04

## 2022-02-28 RX ADMIN — SPIRONOLACTONE 25 MILLIGRAM(S): 25 TABLET, FILM COATED ORAL at 05:04

## 2022-02-28 RX ADMIN — LISINOPRIL 20 MILLIGRAM(S): 2.5 TABLET ORAL at 05:04

## 2022-02-28 NOTE — DISCHARGE NOTE PROVIDER - NSDCCPCAREPLAN_GEN_ALL_CORE_FT
PRINCIPAL DISCHARGE DIAGNOSIS  Diagnosis: Aortic dissection  Assessment and Plan of Treatment: CTA imaging revealed a focal dissection involving distal abdominal aorta. Vascular surgery was consulted and recommended medical management to continue with Statin and maintain BP control but no acute vascular surgical intervention during this admission. You are advised to follow up outpatient with Dr. Banks in one week.      SECONDARY DISCHARGE DIAGNOSES  Diagnosis: Chest pain due to CAD  Assessment and Plan of Treatment: Due to your clinical symptoms and your extensive cardiac Hx a further work up was performed during this admission. Cardiology was consulted. An echo was performed revealing wall motion abnormalities, necessitating a cardiac cath. No intervention was required during catherization. You are advised to take all medications as prescribed, to adhere to a heart healthy diet low in saturated fats and sodium and follow up with outpatient cardiology in 1-2days.    Diagnosis: HTN (hypertension)  Assessment and Plan of Treatment: You are advised to take all medications as prescribed and adhere to a heart healthy diet.    Diagnosis: Apical mural thrombus  Assessment and Plan of Treatment: Imagining during your stayed revealed an apical mural thrombus which is a clot that is attached to the upper portion of the cardiac chamber wall. You are advised to take all medications as prescribed and follow up with outpatient cardiology in 1-2days.    Diagnosis: Chronic HFrEF (heart failure with reduced ejection fraction)  Assessment and Plan of Treatment: You have chronic heart failure where your heart is not able to optimally pump out blood. You are advised to take all medications as prescribed and follow up with out patient cardiology in 1-2days.    Diagnosis: Unstable angina  Assessment and Plan of Treatment: Please take all medications as prescribed and follow up with cardiology outpatient in 1-2days.

## 2022-02-28 NOTE — PROGRESS NOTE ADULT - ATTENDING COMMENTS
51M history as listed significant for CAD/MI/multiple prior stents with ISR/chronic angina, LV apical aneurysm/thrombus restarted on warfarin after recent CVA no residual deficit, on 3 antianginals attempted uptitrate Imdur dose lead to dizziness, p/w recurrent angina, serial Trop negative, repeat cath today shows mid-distal LAD with mod-severe ISR suspect as culprit for recurrent chest pain, discussed with interventionalist/Dr. Escobar, possible option of brachytherapy as outpatient to be done at [facility] with Dr. Hannah Cheatham.   -advised to increase use of sublingual nitro for chest pain PRN  -resume warfarin dosing INR 2-3 for prior apical thrombus, resolved on TTE on this admission but need indefinite use given recurrent thrombus off AC and h/o CVA  -continue all GDMT  -small focal infrarenal aortic dissection continue BP control per vascular, outpatient follow up  -stable for discharge home today, discussed over the phone with his spouse, follow up in my office next week        Mulugeta Rush DO, [facility]  Faculty Non-Invasive Cardiologist  [phone]

## 2022-02-28 NOTE — PROGRESS NOTE ADULT - SUBJECTIVE AND OBJECTIVE BOX
[place] [facility]                                                               Office: [address] [address]                                                              Telephone: [phone]. Fax:[phone]                                                                             PROGRESS NOTE  Reason for follow up: Chest Pain  Overnight: No new events.   Update: Patient denies chest pain at this time    Review of symptoms:   Cardiac:  No chest pain. No dyspnea. No palpitations.  Respiratory: No cough. No dyspnea  Gastrointestinal: No diarrhea. No abdominal pain. No bleeding.     Past medical history: No updates.   	  Vitals:  T(C): 36.6 (02-27-22 @ 10:07), Max: 37.1 (02-26-22 @ 23:20)  HR: 72 (02-27-22 @ 10:07) (62 - 79)  BP: 127/85 (02-27-22 @ 10:07) (120/72 - 145/90)  RR: 19 (02-27-22 @ 10:07) (15 - 20)  SpO2: 99% (02-27-22 @ 10:07) (95% - 99%)  Wt(kg): --  I&O's Summary    Weight (kg): 90.7 (02-26 @ 10:23)      PHYSICAL EXAM:  Appearance: Comfortable. No acute distress  HEENT:  Head and neck: Atraumatic. Normocephalic.  Normal oral mucosa, PERRL, Neck is supple. No JVD, No carotid bruit.   Neurologic: A&Ox 3, no focal deficits. EOMI, Cranial nerves are intact.  Lymphatic: No cervical lymphadenopathy  Cardiovascular: Normal S1 S2, No murmur, rubs/gallops. No JVD, No edema  Respiratory: Lungs clear to auscultation  Gastrointestinal:  Soft, Non-tender, + BS  Lower Extremities: No edema  Psychiatry: Patient is calm. No agitation. Mood & affect appropriate  Skin: No rashes/ecchymoses/cyanosis/ulcers visualized on the face, hands or feet.      CURRENT MEDICATIONS:  carvedilol 12.5 milliGRAM(s) Oral every 12 hours  lisinopril 20 milliGRAM(s) Oral daily  ranolazine 1000 milliGRAM(s) Oral two times a day  spironolactone 25 milliGRAM(s) Oral daily  gabapentin  atorvastatin  clopidogrel Tablet  enoxaparin Injectable  influenza   Vaccine    DIAGNOSTIC TESTING:  [ ] Echocardiogram: Pending Read  [ ]  Catheterization:  [ ] Stress Test:    OTHER: 	      LABS:	 	  CARDIAC MARKERS ( 26 Feb 2022 17:05 )  x     / <0.01 ng/mL / x     / x     / x      p-BNP 26 Feb 2022 17:05: x    , CARDIAC MARKERS ( 26 Feb 2022 11:27 )  x     / <0.01 ng/mL / x     / x     / x      p-BNP 26 Feb 2022 11:27: 95 pg/mL                          15.6   5.52  )-----------( 187      ( 26 Feb 2022 11:27 )             47.1     02-26    140  |  103  |  14.7  ----------------------------<  87  4.5   |  28.0  |  0.95    Ca    8.9      26 Feb 2022 11:27  Mg     2.0     02-26    TPro  7.0  /  Alb  4.4  /  TBili  0.4  /  DBili  x   /  AST  23  /  ALT  21  /  AlkPhos  55  02-26    proBNP: Serum Pro-Brain Natriuretic Peptide: 95 pg/mL (02-26 @ 11:27)    TELEMETRY: Reviewed  NSR 70s no events overnight   ECG:  Reviewed by me. 	    
                                                                         Department of Cardiology                                                                  [facility]/[place] [address] [address] [facility]                                                            Telephone: [phone]. Fax:[phone]                                                                           Cardiac Catheterization Note       Subjective:  51y  Male who had a left heart catheterization which showed (official report pending):       LM: No significant CAD       LAD: Diffuse 70-80% mid to distal stenosis/ISR, unchanged from prior cath.       LCX: Patent stents       RCA: 20-30% mid ISR.         Access: Right radial artery       Hemostasis: Radial band       Total Contrast: 61 mL Omnipaque       Total Heparin: 5,000 units       Antiplatelet Given: N/A    PAST MEDICAL & SURGICAL HISTORY:  HTN (hypertension)  MI (myocardial infarction): 2 coronary stents  Thrombosis: apical  Stented coronary artery  CAD (coronary artery disease)  Heart block: 2:1 HB  Former smoker  Status post CVA: residual rt. lower ext weakness.  Cardiac defibrillator in place: 2017: [employer] AF MRI VR Surescan ICD Model# XBQU2J8, SN# [id number]    FAMILY HISTORY:  No pertinent family history in first degree relatives    Home Medications:  carvedilol 25 mg oral tablet: 0.5 tab(s) orally every 12 hours (26 Feb 2022 18:33)  Coumadin 3 mg oral tablet: 1 tab(s) orally once a day (at bedtime) (26 Feb 2022 18:33)  gabapentin 100 mg oral tablet: orally once a day (at bedtime) (26 Feb 2022 18:06)  isosorbide mononitrate 60 mg oral tablet, extended release: 1 tab(s) orally once a day (in the morning) (26 Feb 2022 18:06)  lisinopril 20 mg oral tablet: 1 tab(s) orally once a day  reports taking half a pill 2/2 hypotension at home (26 Feb 2022 18:06)  nitroglycerin 0.4 mg sublingual tablet:  (26 Feb 2022 18:06)  ranolazine 500 mg oral tablet, extended release: 2  orally 2 times a day (26 Feb 2022 18:33)    Patient is a 51y old  Male who presents with a chief complaint of chest pain (28 Feb 2022 07:46)    HEALTH ISSUES - PROBLEM Dx:  Chest pain due to CAD  Chronic HFrEF (heart failure with reduced ejection fraction)  Apical mural thrombus  Aortic dissection  Hypertension    HPI: 51M Creole-speaking man with history of CAD with multivessel stenting , MI (2017 at [facility] with restenosis of RCA and LAD s/p PEPE to mRCA), LV apical thrombus (on warfarin), HFrEF (EF 40-45%, Nov2021), syncope with 2:1 heart block s/p [employer] AICD 5/2018 (DDD ), CVA (6/2019) and with chronic angina presents to with complaints of 1 week of left sided chest pain, without radiation described as burning and currently 5/10, associated with diaphoresis. Patient and patient's cousin report that he took his Imdur and Ranexa this morning and felt weak, dizzy and diaphoretic. Cousin checked his BP and found him to be hypotensive (80/60). He then took ASA 81mg because he was scared he was having an MI and came to the hospital. He did not take any of his other medications today. Patient was recently seen at [facility] office January 21,2022 (see course below). Denies back pain, headache, dizziness, SOB, NORIEGA, syncope, blood in stool/urine or N/V.    General: No fatigue, no fevers/chills  Respiratory: No dyspnea, no cough, no wheeze  CV: No chest pain, no palpitations  Abd: No nausea  Neuro: No headache, no dizziness    No Known Allergies    Objective:  Vital Signs Last 24 Hrs  T(C): 36.4 (28 Feb 2022 06:39), Max: 36.9 (27 Feb 2022 15:48)  T(F): 97.6 (28 Feb 2022 06:39), Max: 98.4 (27 Feb 2022 15:48)  HR: 58 (28 Feb 2022 06:39) (57 - 84)  BP: 142/83 (28 Feb 2022 06:39) (127/85 - 154/86)  BP(mean): 93 (28 Feb 2022 05:00) (92 - 103)  RR: 16 (28 Feb 2022 06:39) (16 - 20)  SpO2: 98% (28 Feb 2022 06:39) (96% - 99%)    CM: SR  Neuro: A&OX3, CN 2-12 intact  HEENT: NC, AT  Lungs: CTA B/L  CV: S1, S2, no murmur, RRR  Abd: Soft  Extremity: Right radial band: no bleeding, fingers warm with good cap refil    Echo:   Left Ventricle: Endocardial visualization was enhanced with intravenous echo contrast. The left ventricular internal cavity size is normal. Global LV systolic function was mildly decreased. Left ventricular ejection fraction, by visual estimation, is 40 to 45%. Findings are consistent with ischemic cardiomyopathy.  LV Wall Scoring: The entire apex and basal inferoseptal segment are akinetic. The basal inferior segment is hypokinetic.  Right Ventricle: Normal right ventricular size and function.  Left Atrium: The left atrium is normal in size.  Right Atrium: The right atrium is normal in size.  Pericardium: There is no evidence of pericardial effusion.  Mitral Valve: Mild thickening of the anterior and posterior mitral valve leaflets. Mitral leaflet mobility is normal. Trace mitral valve regurgitation is seen.  Tricuspid Valve: The tricuspid valve is normal in structure. Trivial tricuspid regurgitation is visualized. Adequate TR velocity was not obtained to accurately assess RVSP.  Aortic Valve: The aortic valve is trileaflet. Peak transaortic gradient equals 5.4 mmHg, mean transaortic gradient equals 2.0 mmHg, the calculated aortic valve area equals 3.29 cm² by the continuity equation consistent with normally opening aortic valve. Trivial aortic valve regurgitation is seen.  Pulmonic Valve: Trace pulmonic valve regurgitation.  Aorta: The aortic root is normal in size and structure.  Pulmonary Artery: The main pulmonary artery is normal in size.  Venous: The inferior vena cava was normal sized, with respiratory size variation greater than 50%.                        15.4   5.11  )-----------( 189      ( 28 Feb 2022 05:55 )             47.1     02-28    137  |  100  |  17.0  ----------------------------<  94  3.9   |  28.0  |  1.01    Ca    8.9      28 Feb 2022 05:55  Phos  2.6     02-27  Mg     1.9     02-27    TPro  6.8  /  Alb  4.1  /  TBili  0.7  /  DBili  x   /  AST  19  /  ALT  20  /  AlkPhos  55  02-28    PT/INR - ( 28 Feb 2022 05:55 )   PT: 20.2 sec;   INR: 1.73 ratio    PTT - ( 26 Feb 2022 11:58 )  PTT:36.2 sec  
[facility] Division of Hospital Medicine    Chief Complaint: chest pain    SUBJECTIVE / OVERNIGHT EVENTS: No acute events overnight. HD stable. Denies chest pain, SOB and abdominal pain.     Patient denies chest pain, SOB, abd pain, N/V, fever, chills, dysuria or any other complaints. All remainder ROS negative.     MEDICATIONS  (STANDING):  atorvastatin 80 milliGRAM(s) Oral at bedtime  carvedilol 12.5 milliGRAM(s) Oral every 12 hours  clopidogrel Tablet 75 milliGRAM(s) Oral daily  enoxaparin Injectable 90 milliGRAM(s) SubCutaneous two times a day  gabapentin 100 milliGRAM(s) Oral at bedtime  influenza   Vaccine 0.5 milliLiter(s) IntraMuscular once  lisinopril 20 milliGRAM(s) Oral daily  ranolazine 1000 milliGRAM(s) Oral two times a day  spironolactone 25 milliGRAM(s) Oral daily    MEDICATIONS  (PRN):        I&O's Summary      PHYSICAL EXAM:  Vital Signs Last 24 Hrs  T(C): 36.6 (27 Feb 2022 10:07), Max: 37.1 (26 Feb 2022 23:20)  T(F): 97.8 (27 Feb 2022 10:07), Max: 98.8 (26 Feb 2022 23:20)  HR: 72 (27 Feb 2022 10:07) (62 - 79)  BP: 127/85 (27 Feb 2022 10:07) (124/70 - 145/90)  BP(mean): 102 (27 Feb 2022 06:40) (95 - 102)  RR: 19 (27 Feb 2022 10:07) (15 - 20)  SpO2: 99% (27 Feb 2022 10:07) (95% - 99%)    CONSTITUTIONAL: NAD, well-developed  RESPIRATORY: Normal respiratory effort; lungs are clear to auscultation bilaterally  CARDIOVASCULAR: Regular rate and rhythm, normal S1 and S2, no murmur/rub/gallop  ABDOMEN: Nontender to palpation, normoactive bowel sounds  PSYCH: A+O to person, place, and time; affect appropriate  NEUROLOGY: CN 2-12 are intact and symmetric; no gross sensory deficits;   SKIN: No rashes; no palpable lesions    LABS:                        15.3   5.17  )-----------( 207      ( 27 Feb 2022 10:48 )             47.0     02-27    138  |  100  |  14.2  ----------------------------<  95  3.6   |  27.0  |  0.97    Ca    8.8      27 Feb 2022 10:48  Phos  2.6     02-27  Mg     1.9     02-27    TPro  7.0  /  Alb  4.3  /  TBili  0.7  /  DBili  x   /  AST  23  /  ALT  24  /  AlkPhos  57  02-27    PT/INR - ( 27 Feb 2022 10:48 )   PT: 25.2 sec;   INR: 2.16 ratio         PTT - ( 26 Feb 2022 11:58 )  PTT:36.2 sec  CARDIAC MARKERS ( 26 Feb 2022 17:05 )  x     / <0.01 ng/mL / x     / x     / x      CARDIAC MARKERS ( 26 Feb 2022 11:27 )  x     / <0.01 ng/mL / x     / x     / x              CAPILLARY BLOOD GLUCOSE            RADIOLOGY & ADDITIONAL TESTS:  Results Reviewed:   Imaging Personally Reviewed:  Electrocardiogram Personally Reviewed:

## 2022-02-28 NOTE — PROGRESS NOTE ADULT - ASSESSMENT
51y Male    Assessment and Plan:     1. CAD, S/P PCI of the   ·	Remove radial band at:   ·	DAPT: Plavix 75mg daily and Aspirin 81mg daily  ·	Statin: Lipitor 80 mg daily  ·	Aggressive lifestyle modification and risk factor reduction.  ·	Cardiac rehab info provided/referral and communication to cardiac rehab completed   ·	CBC, BMP, Mg, HgbA1C, EKG in AM    2. HLD  ·	LDL Goal: NonHDL < 100, LDL < 70  ·	Statin: Lipitor 80 mg daily  ·	Lipid panel in AM    3. HFrEF  ·	GDMT  ·	     Beta Blocker: Coreg 12.5 mg BID  ·	     RAAS Inhibitor: Lisinopril 20 mg daily  ·	     MRA: Aldactone 25 mg daily  ·	     Diuretic:   ·	     Other:   ·	Strict I&O's  ·	Daily standing weights (if able)    4. LV apical thrombus      Discharge Planning:   ·	Discharge:   ·	Follow up as an outpatient with:            51y Male    Assessment and Plan:     1. CAD, S/P LHC  ·	Remove radial band at: 10:30AM  ·	DAPT: Plavix 75mg daily  ·	Statin: Lipitor 80 mg daily  ·	Aggressive lifestyle modification and risk factor reduction.  ·	Cardiac rehab info provided/referral and communication to cardiac rehab completed   ·	CBC, BMP, Mg, HgbA1C, EKG in AM    2. HLD  ·	LDL Goal: NonHDL < 100, LDL < 70  ·	Statin: Lipitor 80 mg daily  ·	Lipid panel in AM    3. HFrEF  ·	GDMT  ·	     Beta Blocker: Coreg 12.5 mg BID  ·	     RAAS Inhibitor: Lisinopril 20 mg daily  ·	     MRA: Aldactone 25 mg daily  ·	     Diuretic: N/A  ·	     Other: N/A  ·	Strict I&O's  ·	Daily standing weights (if able)    4. LV apical thrombus  ·	Restart warfarin 3 mg tonight

## 2022-02-28 NOTE — DISCHARGE NOTE PROVIDER - PROVIDER TOKENS
PROVIDER:[TOKEN:[[id number]],FOLLOWUP:[1-3 days]],PROVIDER:[TOKEN:[[id number]],FOLLOWUP:[1 week]],FREE:[LAST:[Primary Care Provider],PHONE:[(   )    -],FAX:[(   )    -],FOLLOWUP:[1 week]]

## 2022-02-28 NOTE — DISCHARGE NOTE PROVIDER - CARE PROVIDER_API CALL
Mulugeta Rush (DO)  Cardiovascular Disease; Internal Medicine  [address] [place] [address]  Phone: [phone]  Fax: [phone]  Follow Up Time: 1-3 days    Renny Banks (MD)  Surgery  [address] [facility], [address]nd [place] [address]  Phone: [phone]  Fax: [phone]  Follow Up Time: 1 week    Primary Care Provider,   Phone: (   )    -  Fax: (   )    -  Follow Up Time: 1 week

## 2022-02-28 NOTE — CHART NOTE - NSCHARTNOTEFT_GEN_A_CORE
Department of Cardiology                                                                  [employer]/[place] [address] [facility] [place]                                                            Telephone: [phone]. Fax:[phone]                                                                                   Pre Cath Note    51y Male     Planned Procedure:    Pertinent Prior Medications:        Antiplatelet: Plavix 75 mg daily       Aspirin: 81 mg given prior to procedure       Statin:        Beta Blocker:        CCB:        Other Antianginal:        ACEI:     ASA: 3  Mallampati: 2  CCS Class: 4  GFR: 99  Adjusted Bleeding Risk: 0.8%    HPI: 51M Creole-speaking man with history of CAD with multivessel stenting , MI (2017 at [facility] with restenosis of RCA and LAD s/p PEPE to mRCA), LV apical thrombus (on warfarin), HFrEF (EF 40-45%, Nov2021), syncope with 2:1 heart block s/p Medtronic AICD 5/2018 (DDD ), CVA (6/2019) and with chronic angina presents to with complaints of 1 week of left sided chest pain, without radiation described as burning and currently 5/10, associated with diaphoresis. Patient and patient's cousin report that he took his Imdur and Ranexa this morning and felt weak, dizzy and diaphoretic. Cousin checked his BP and found him to be hypotensive (80/60). He then took ASA 81mg because he was scared he was having an MI and came to the hospital. He did not take any of his other medications today. Patient was recently seen at OP Cardiology office January 21,2022 (see course below). Denies back pain, headache, dizziness, SOB, NORIEGA, syncope, blood in stool/urine or N/V.    Nuclear Stress Test: N/A    LHC:   CORONARY VESSELS: The coronary circulation is right dominant.  LM:     --  LM: Normal.  LAD:     --  Mid LAD: There was a 20 % stenosis. In a second lesion, there was a diffuse 70 % stenosis in-stent.  --  Distal LAD: There was a diffuse 70 % stenosis in-stent. In a second lesion, there was a 80 % stenosis. It was treated with a SYNERGY 2.50MM X 20MM drug-eluting stent and a SYNERGY 2.25MM X 38MM drug-eluting stent.  CX:     --  Mid circumflex: There was a 0 % stenosis in-stent (4.00 X 15 KAUSHAL drug-eluting stent).  RCA:     --  Mid RCA: There was a 20 % stenosis in-stent. Superior takeoff.    Echo:   Left Ventricle: Endocardial visualization was enhanced with intravenous echo contrast. The left ventricular internal cavity size is normal. Global LV systolic function was mildly decreased. Left ventricular ejection fraction, by visual estimation, is 40 to 45%. Findings are consistent with ischemic cardiomyopathy.  LV Wall Scoring: The entire apex and basal inferoseptal segment are akinetic. The basal inferior segment is hypokinetic.  Right Ventricle: Normal right ventricular size and function.  Left Atrium: The left atrium is normal in size.  Right Atrium: The right atrium is normal in size.  Pericardium: There is no evidence of pericardial effusion.  Mitral Valve: Mild thickening of the anterior and posterior mitral valve leaflets. Mitral leaflet mobility is normal. Trace mitral valve regurgitation is seen.  Tricuspid Valve: The tricuspid valve is normal in structure. Trivial tricuspid regurgitation is visualized. Adequate TR velocity was not obtained to accurately assess RVSP.  Aortic Valve: The aortic valve is trileaflet. Peak transaortic gradient equals 5.4 mmHg, mean transaortic gradient equals 2.0 mmHg, the calculated aortic valve area equals 3.29 cm² by the continuity equation consistent with normally opening aortic valve. Trivial aortic valve regurgitation is seen.  Pulmonic Valve: Trace pulmonic valve regurgitation.  Aorta: The aortic root is normal in size and structure.  Pulmonary Artery: The main pulmonary artery is normal in size.  Venous: The inferior vena cava was normal sized, with respiratory size variation greater than 50%.    Allergies: No Known Allergies    PAST MEDICAL & SURGICAL HISTORY:  HTN (hypertension)  MI (myocardial infarction): 2 coronary stents  Thrombosis: apical  Stented coronary artery  CAD (coronary artery disease)  Heart block: 2:1 HB  Former smoker  Status post CVA: residual rt. lower ext weakness.  Cardiac defibrillator in place: 2017: Medtronic Visa AF MRI VR Surescan ICD Model# [id number], SN# [id number]    Home Medications:  carvedilol 25 mg oral tablet: 0.5 tab(s) orally every 12 hours (26 Feb 2022 18:33)  Coumadin 3 mg oral tablet: 1 tab(s) orally once a day (at bedtime) (26 Feb 2022 18:33)  gabapentin 100 mg oral tablet: orally once a day (at bedtime) (26 Feb 2022 18:06)  isosorbide mononitrate 60 mg oral tablet, extended release: 1 tab(s) orally once a day (in the morning) (26 Feb 2022 18:06)  lisinopril 20 mg oral tablet: 1 tab(s) orally once a day  reports taking half a pill 2/2 hypotension at home (26 Feb 2022 18:06)  nitroglycerin 0.4 mg sublingual tablet:  (26 Feb 2022 18:06)  ranolazine 500 mg oral tablet, extended release: 2  orally 2 times a day (26 Feb 2022 18:33)    Physical Examination:   General: Awake, alert, speech clear, no acute distress.  Neck: No bruit, normal jugular venous pressures  Chest: Clear CTA, S1, S2, no murmur, RRR  Extremities: No edema                          15.4   5.11  )-----------( 189      ( 28 Feb 2022 05:55 )             47.1     02-28    137  |  100  |  17.0  -----------------------<  94  3.9   |  28.0  |  1.01    Ca    8.9      28 Feb 2022 05:55  Phos  2.6     02-27  Mg     1.9     02-27    TPro  6.8  /  Alb  4.1  /  TBili  0.7  /  DBili  x   /  AST  19  /  ALT  20  /  AlkPhos  55  02-28    CARDIAC MARKERS ( 26 Feb 2022 17:05 ): <0.01 ng/mL       CARDIAC MARKERS ( 26 Feb 2022 11:27 ): <0.01 ng/mL        PT/INR - ( 28 Feb 2022 05:55 )   PT: 20.2 sec;   INR: 1.73 ratio          Assessment and Plan:   The patient was examined and interviewed by me today. There has been no change from the original history and physical except as noted above.    Problem List:   1.       2. Department of Cardiology                                                                  [facility]/[place] [address] [facility]                                                            Telephone: [phone]. Fax:[phone]                                                                                   Pre Cath Note    51y Male     Planned Procedure: [facility]    Pertinent Prior Medications:        Antiplatelet: Plavix 75 mg daily       Aspirin: 81 mg given prior to procedure       Statin: Lipitor 80 mg daily       Beta Blocker: Coreg 12.5 mg BID       CCB: N/A       Other Antianginal: Ranexa 1000 mg BID, Imdur 60 mg daily       ACEI: Lisinopril 20 mg daily    ASA: 3  Mallampati: 2  CCS Class: 4  GFR: 99  Adjusted Bleeding Risk: 0.8%    HPI: 51M Creole-speaking man with history of CAD with multivessel stenting , MI (2017 at [facility] with restenosis of RCA and LAD s/p PEPE to mRCA), LV apical thrombus (on warfarin), HFrEF (EF 40-45%, Nov2021), syncope with 2:1 heart block s/p Medtronic AICD 5/2018 (DDD ), CVA (6/2019) and with chronic angina presents to with complaints of 1 week of left sided chest pain, without radiation described as burning and currently 5/10, associated with diaphoresis. Patient and patient's cousin report that he took his Imdur and Ranexa this morning and felt weak, dizzy and diaphoretic. Cousin checked his BP and found him to be hypotensive (80/60). He then took ASA 81mg because he was scared he was having an MI and came to the hospital. He did not take any of his other medications today. Patient was recently seen at OP Cardiology office January 21,2022 (see course below). Denies back pain, headache, dizziness, SOB, NORIEGA, syncope, blood in stool/urine or N/V.    Nuclear Stress Test: N/A    LHC:   CORONARY VESSELS: The coronary circulation is right dominant.  LM:     --  LM: Normal.  LAD:     --  Mid LAD: There was a 20 % stenosis. In a second lesion, there was a diffuse 70 % stenosis in-stent.  --  Distal LAD: There was a diffuse 70 % stenosis in-stent. In a second lesion, there was a 80 % stenosis. It was treated with a SYNERGY 2.50MM X 20MM drug-eluting stent and a SYNERGY 2.25MM X 38MM drug-eluting stent.  CX:     --  Mid circumflex: There was a 0 % stenosis in-stent (4.00 X 15 KAUSHAL drug-eluting stent).  RCA:     --  Mid RCA: There was a 20 % stenosis in-stent. Superior takeoff.    Echo:   Left Ventricle: Endocardial visualization was enhanced with intravenous echo contrast. The left ventricular internal cavity size is normal. Global LV systolic function was mildly decreased. Left ventricular ejection fraction, by visual estimation, is 40 to 45%. Findings are consistent with ischemic cardiomyopathy.  LV Wall Scoring: The entire apex and basal inferoseptal segment are akinetic. The basal inferior segment is hypokinetic.  Right Ventricle: Normal right ventricular size and function.  Left Atrium: The left atrium is normal in size.  Right Atrium: The right atrium is normal in size.  Pericardium: There is no evidence of pericardial effusion.  Mitral Valve: Mild thickening of the anterior and posterior mitral valve leaflets. Mitral leaflet mobility is normal. Trace mitral valve regurgitation is seen.  Tricuspid Valve: The tricuspid valve is normal in structure. Trivial tricuspid regurgitation is visualized. Adequate TR velocity was not obtained to accurately assess RVSP.  Aortic Valve: The aortic valve is trileaflet. Peak transaortic gradient equals 5.4 mmHg, mean transaortic gradient equals 2.0 mmHg, the calculated aortic valve area equals 3.29 cm² by the continuity equation consistent with normally opening aortic valve. Trivial aortic valve regurgitation is seen.  Pulmonic Valve: Trace pulmonic valve regurgitation.  Aorta: The aortic root is normal in size and structure.  Pulmonary Artery: The main pulmonary artery is normal in size.  Venous: The inferior vena cava was normal sized, with respiratory size variation greater than 50%.    Allergies: No Known Allergies    PAST MEDICAL & SURGICAL HISTORY:  HTN (hypertension)  MI (myocardial infarction): 2 coronary stents  Thrombosis: apical  Stented coronary artery  CAD (coronary artery disease)  Heart block: 2:1 HB  Former smoker  Status post CVA: residual rt. lower ext weakness.  Cardiac defibrillator in place: 2017: Medtronic Visa AF MRI VR Surescan ICD Model# DVWP7W3, SN# [id number]    Home Medications:  carvedilol 25 mg oral tablet: 0.5 tab(s) orally every 12 hours (26 Feb 2022 18:33)  Coumadin 3 mg oral tablet: 1 tab(s) orally once a day (at bedtime) (26 Feb 2022 18:33)  gabapentin 100 mg oral tablet: orally once a day (at bedtime) (26 Feb 2022 18:06)  isosorbide mononitrate 60 mg oral tablet, extended release: 1 tab(s) orally once a day (in the morning) (26 Feb 2022 18:06)  lisinopril 20 mg oral tablet: 1 tab(s) orally once a day  reports taking half a pill 2/2 hypotension at home (26 Feb 2022 18:06)  nitroglycerin 0.4 mg sublingual tablet:  (26 Feb 2022 18:06)  ranolazine 500 mg oral tablet, extended release: 2  orally 2 times a day (26 Feb 2022 18:33)    Physical Examination:   General: Awake, alert, speech clear, no acute distress.  Neck: No bruit, normal jugular venous pressures  Chest: Clear CTA, S1, S2, no murmur, RRR  Extremities: No edema                          15.4   5.11  )-----------( 189      ( 28 Feb 2022 05:55 )             47.1     02-28    137  |  100  |  17.0  -----------------------<  94  3.9   |  28.0  |  1.01    Ca    8.9      28 Feb 2022 05:55  Phos  2.6     02-27  Mg     1.9     02-27    TPro  6.8  /  Alb  4.1  /  TBili  0.7  /  DBili  x   /  AST  19  /  ALT  20  /  AlkPhos  55  02-28    CARDIAC MARKERS ( 26 Feb 2022 17:05 ): <0.01 ng/mL       CARDIAC MARKERS ( 26 Feb 2022 11:27 ): <0.01 ng/mL        PT/INR - ( 28 Feb 2022 05:55 )   PT: 20.2 sec;   INR: 1.73 ratio          Assessment and Plan:   The patient was examined and interviewed by me today. There has been no change from the original history and physical except as noted above.    Problem List:   1. UA while on 3 antianginal therapies  C and possible intervention  If intervention performed, will initiate aspirin 81 mg daily for 30 days    2. LV apical thrombus  resume warfarin post procedure.

## 2022-02-28 NOTE — DISCHARGE NOTE PROVIDER - HOSPITAL COURSE
Patient is a 51M w/ hx of CAD w/ multivessel stenting, MI w/ restenosis of RCA and LAD (2017), LV apical thrombus (coumadin), HFrEF (EF 40-45%), syncope with 2:1 heart block s/p Medtronic AICD 5/2018, CVA who presented to ED w/ LT-sided chest pain. CTA A/P was concerning for focal dissection involving distal abdominal aorta and thus patient was admitted for further workup and management. Vascular surg consulted recommending no acute vascular surgical intervention during this admission, to continue with Statin and maintain BP control and follow up outpatient with Dr. Bakns in one week. Cardio consulted, TTE with wall motion abnormalities observed. 2/28 patient underwent LHC revealing non significant CAD in LM, unchanged patentcy from prior Cath in LAD, patent stents in LCX, and 20-30% mid ISR of RCA. Patient is medically cleared by cardiology for outpatient follow up in 1-2 days. Patient is hemodynamically stable and advised to take all medications as prescribed.

## 2022-02-28 NOTE — DISCHARGE NOTE NURSING/CASE MANAGEMENT/SOCIAL WORK - PATIENT PORTAL LINK FT
You can access the FollowMyHealth Patient Portal offered by [facility] by registering at the following website: [email or web address]. By joining [facility] FollowMyHealth portal, you will also be able to view your health information using other applications (apps) compatible with our system.

## 2022-02-28 NOTE — DISCHARGE NOTE PROVIDER - ATTENDING DISCHARGE PHYSICAL EXAMINATION:
Vital Signs Last 24 Hrs  T(F): 97.7 (28 Feb 2022 13:11), Max: 98.4 (27 Feb 2022 15:48)  HR: 59 (28 Feb 2022 13:11) (56 - 68)  BP: 116/77 (28 Feb 2022 13:11) (115/76 - 154/86)  RR: 18 (28 Feb 2022 13:11) (15 - 19)  SpO2: 100% (28 Feb 2022 13:11) (96% - 100%)    Physical Exam:  Constitutional: NAD, awake and alert, well-developed  Neck: Soft and supple, No LAD, No JVD  Respiratory: cta b/l no wheezing no rhonchi  Cardiovascular: +s1/s2 no edema b/l le  Gastrointestinal: soft nt nd bs+  Vascular: 2+ peripheral pulses  Neurological: A/O x 3, no focal deficits

## 2022-02-28 NOTE — DISCHARGE NOTE PROVIDER - CARE PROVIDERS DIRECT ADDRESSES
,DirectAddress_Unknown,[email or web address],DirectAddress_Unknown

## 2022-02-28 NOTE — DISCHARGE NOTE PROVIDER - NSDCMRMEDTOKEN_GEN_ALL_CORE_FT
atorvastatin 80 mg oral tablet: 1 tab(s) orally once a day (at bedtime)  carvedilol 25 mg oral tablet: 0.5 tab(s) orally every 12 hours  clopidogrel 75 mg oral tablet: 1 tab(s) orally once a day  Coumadin 3 mg oral tablet: 1 tab(s) orally once a day (at bedtime)  gabapentin 100 mg oral tablet: orally once a day (at bedtime)  isosorbide mononitrate 60 mg oral tablet, extended release: 1 tab(s) orally once a day (in the morning)  lisinopril 20 mg oral tablet: 1 tab(s) orally once a day  reports taking half a pill 2/2 hypotension at home  nitroglycerin 0.4 mg sublingual tablet:   ranolazine 500 mg oral tablet, extended release: 2  orally 2 times a day  spironolactone 25 mg oral tablet: 1 tab(s) orally once a day   atorvastatin 80 mg oral tablet: 1 tab(s) orally once a day (at bedtime)  carvedilol 25 mg oral tablet: 0.5 tab(s) orally every 12 hours  clopidogrel 75 mg oral tablet: 1 tab(s) orally once a day  Coumadin 3 mg oral tablet: 1 tab(s) orally once a day (at bedtime)  gabapentin 100 mg oral tablet: orally once a day (at bedtime)  isosorbide mononitrate 60 mg oral tablet, extended release: 1 tab(s) orally once a day (in the morning)  lisinopril 20 mg oral tablet: 1 tab(s) orally once a day  reports taking half a pill 2/2 hypotension at home  ranolazine 1000 mg oral tablet, extended release: 1 tab(s) orally 2 times a day   spironolactone 25 mg oral tablet: 1 tab(s) orally once a day

## 2022-03-21 ENCOUNTER — RESULT CHARGE (OUTPATIENT)
Age: 52
End: 2022-03-21

## 2022-03-24 ENCOUNTER — LABORATORY RESULT (OUTPATIENT)
Age: 52
End: 2022-03-24

## 2022-04-11 ENCOUNTER — NON-APPOINTMENT (OUTPATIENT)
Age: 52
End: 2022-04-11

## 2022-04-13 ENCOUNTER — TRANSCRIPTION ENCOUNTER (OUTPATIENT)
Age: 52
End: 2022-04-13

## 2022-04-13 ENCOUNTER — INPATIENT (INPATIENT)
Facility: HOSPITAL | Age: 52
LOS: 0 days | Discharge: HOME | DRG: 251 | End: 2022-04-14
Attending: INTERNAL MEDICINE | Admitting: INTERNAL MEDICINE
Payer: MEDICAID

## 2022-04-13 VITALS — HEIGHT: 68.5 IN | WEIGHT: 186.95 LBS

## 2022-04-13 DIAGNOSIS — Z95.810 PRESENCE OF AUTOMATIC (IMPLANTABLE) CARDIAC DEFIBRILLATOR: Chronic | ICD-10-CM

## 2022-04-13 DIAGNOSIS — I25.10 ATHEROSCLEROTIC HEART DISEASE OF NATIVE CORONARY ARTERY WITHOUT ANGINA PECTORIS: ICD-10-CM

## 2022-04-13 LAB
ANION GAP SERPL CALC-SCNC: 13 MMOL/L — SIGNIFICANT CHANGE UP (ref 5–17)
BUN SERPL-MCNC: 11 MG/DL — SIGNIFICANT CHANGE UP (ref 7–23)
CALCIUM SERPL-MCNC: 9.6 MG/DL — SIGNIFICANT CHANGE UP (ref 8.4–10.5)
CHLORIDE SERPL-SCNC: 101 MMOL/L — SIGNIFICANT CHANGE UP (ref 96–108)
CO2 SERPL-SCNC: 25 MMOL/L — SIGNIFICANT CHANGE UP (ref 22–31)
CREAT SERPL-MCNC: 1.07 MG/DL — SIGNIFICANT CHANGE UP (ref 0.5–1.3)
EGFR: 84 ML/MIN/1.73M2 — SIGNIFICANT CHANGE UP
GLUCOSE SERPL-MCNC: 92 MG/DL — SIGNIFICANT CHANGE UP (ref 70–99)
HCT VFR BLD CALC: 47.7 % — SIGNIFICANT CHANGE UP (ref 39–50)
HGB BLD-MCNC: 15.5 G/DL — SIGNIFICANT CHANGE UP (ref 13–17)
INR BLD: 2.04 RATIO — HIGH (ref 0.88–1.16)
MCHC RBC-ENTMCNC: 29.9 PG — SIGNIFICANT CHANGE UP (ref 27–34)
MCHC RBC-ENTMCNC: 32.5 GM/DL — SIGNIFICANT CHANGE UP (ref 32–36)
MCV RBC AUTO: 91.9 FL — SIGNIFICANT CHANGE UP (ref 80–100)
NRBC # BLD: 0 /100 WBCS — SIGNIFICANT CHANGE UP (ref 0–0)
PLATELET # BLD AUTO: 211 K/UL — SIGNIFICANT CHANGE UP (ref 150–400)
POTASSIUM SERPL-MCNC: 4.1 MMOL/L — SIGNIFICANT CHANGE UP (ref 3.5–5.3)
POTASSIUM SERPL-SCNC: 4.1 MMOL/L — SIGNIFICANT CHANGE UP (ref 3.5–5.3)
PROTHROM AB SERPL-ACNC: 23.8 SEC — HIGH (ref 10.5–13.4)
RBC # BLD: 5.19 M/UL — SIGNIFICANT CHANGE UP (ref 4.2–5.8)
RBC # FLD: 12.1 % — SIGNIFICANT CHANGE UP (ref 10.3–14.5)
SODIUM SERPL-SCNC: 139 MMOL/L — SIGNIFICANT CHANGE UP (ref 135–145)
WBC # BLD: 6.73 K/UL — SIGNIFICANT CHANGE UP (ref 3.8–10.5)
WBC # FLD AUTO: 6.73 K/UL — SIGNIFICANT CHANGE UP (ref 3.8–10.5)

## 2022-04-13 PROCEDURE — 99221 1ST HOSP IP/OBS SF/LOW 40: CPT | Mod: 25

## 2022-04-13 PROCEDURE — 77316 BRACHYTX ISODOSE PLAN SIMPLE: CPT | Mod: 26

## 2022-04-13 PROCEDURE — 92974 CATH PLACE CARDIO BRACHYTX: CPT

## 2022-04-13 PROCEDURE — 99152 MOD SED SAME PHYS/QHP 5/>YRS: CPT

## 2022-04-13 PROCEDURE — 77263 THER RADIOLOGY TX PLNG CPLX: CPT

## 2022-04-13 PROCEDURE — 77470 SPECIAL RADIATION TREATMENT: CPT | Mod: 26

## 2022-04-13 PROCEDURE — 77290 THER RAD SIMULAJ FIELD CPLX: CPT | Mod: 26

## 2022-04-13 PROCEDURE — 93010 ELECTROCARDIOGRAM REPORT: CPT

## 2022-04-13 PROCEDURE — 92920 PRQ TRLUML C ANGIOP 1ART&/BR: CPT | Mod: LD

## 2022-04-13 PROCEDURE — 77770 HDR RDNCL NTRSTL/ICAV BRCHTX: CPT | Mod: 26

## 2022-04-13 RX ORDER — CARVEDILOL PHOSPHATE 80 MG/1
12.5 CAPSULE, EXTENDED RELEASE ORAL EVERY 12 HOURS
Refills: 0 | Status: DISCONTINUED | OUTPATIENT
Start: 2022-04-13 | End: 2022-04-14

## 2022-04-13 RX ORDER — SPIRONOLACTONE 25 MG/1
25 TABLET, FILM COATED ORAL DAILY
Refills: 0 | Status: DISCONTINUED | OUTPATIENT
Start: 2022-04-13 | End: 2022-04-14

## 2022-04-13 RX ORDER — ISOSORBIDE MONONITRATE 60 MG/1
60 TABLET, EXTENDED RELEASE ORAL DAILY
Refills: 0 | Status: DISCONTINUED | OUTPATIENT
Start: 2022-04-13 | End: 2022-04-14

## 2022-04-13 RX ORDER — CLOPIDOGREL BISULFATE 75 MG/1
75 TABLET, FILM COATED ORAL DAILY
Refills: 0 | Status: DISCONTINUED | OUTPATIENT
Start: 2022-04-14 | End: 2022-04-14

## 2022-04-13 RX ORDER — LISINOPRIL 2.5 MG/1
5 TABLET ORAL DAILY
Refills: 0 | Status: DISCONTINUED | OUTPATIENT
Start: 2022-04-13 | End: 2022-04-14

## 2022-04-13 RX ORDER — LISINOPRIL 2.5 MG/1
1 TABLET ORAL
Qty: 0 | Refills: 0 | DISCHARGE

## 2022-04-13 RX ORDER — GABAPENTIN 400 MG/1
100 CAPSULE ORAL AT BEDTIME
Refills: 0 | Status: DISCONTINUED | OUTPATIENT
Start: 2022-04-13 | End: 2022-04-14

## 2022-04-13 RX ORDER — ATORVASTATIN CALCIUM 80 MG/1
80 TABLET, FILM COATED ORAL AT BEDTIME
Refills: 0 | Status: DISCONTINUED | OUTPATIENT
Start: 2022-04-13 | End: 2022-04-14

## 2022-04-13 RX ORDER — RANOLAZINE 500 MG/1
1000 TABLET, FILM COATED, EXTENDED RELEASE ORAL
Refills: 0 | Status: DISCONTINUED | OUTPATIENT
Start: 2022-04-13 | End: 2022-04-14

## 2022-04-13 RX ORDER — WARFARIN SODIUM 2.5 MG/1
3 TABLET ORAL ONCE
Refills: 0 | Status: COMPLETED | OUTPATIENT
Start: 2022-04-13 | End: 2022-04-13

## 2022-04-13 RX ORDER — SODIUM CHLORIDE 9 MG/ML
1000 INJECTION INTRAMUSCULAR; INTRAVENOUS; SUBCUTANEOUS
Refills: 0 | Status: DISCONTINUED | OUTPATIENT
Start: 2022-04-13 | End: 2022-04-14

## 2022-04-13 RX ADMIN — WARFARIN SODIUM 3 MILLIGRAM(S): 2.5 TABLET ORAL at 21:27

## 2022-04-13 RX ADMIN — ATORVASTATIN CALCIUM 80 MILLIGRAM(S): 80 TABLET, FILM COATED ORAL at 21:27

## 2022-04-13 RX ADMIN — CARVEDILOL PHOSPHATE 12.5 MILLIGRAM(S): 80 CAPSULE, EXTENDED RELEASE ORAL at 19:39

## 2022-04-13 RX ADMIN — SODIUM CHLORIDE 75 MILLILITER(S): 9 INJECTION INTRAMUSCULAR; INTRAVENOUS; SUBCUTANEOUS at 15:26

## 2022-04-13 RX ADMIN — RANOLAZINE 1000 MILLIGRAM(S): 500 TABLET, FILM COATED, EXTENDED RELEASE ORAL at 19:40

## 2022-04-13 RX ADMIN — GABAPENTIN 100 MILLIGRAM(S): 400 CAPSULE ORAL at 21:27

## 2022-04-13 NOTE — ASU PATIENT PROFILE, ADULT - CENTRAL VENOUS CATHETER
CC:   Chief Complaint   Patient presents with   • Sore Throat     for about 3 days got worse last night   • Cough   • Vomiting     thinking from the cough         HPI:  Chapincito is a 4 year old year old male who presents with a 3 day  h/o rhinorrhea, congestion, cough, sorethroat, difficulty sleep and fever. Possible sick contacts; attends school. There has either been no use or no improvement from the use of OTC medications.      Vitals:    11/09/19 1030   BP: 122/74   Pulse: 140   Resp: 20   Temp: 100 °F (37.8 °C)   TempSrc: Temporal   SpO2: 98%   Weight: (!) 29.7 kg   Height: 4' (1.219 m)     PHYSICAL EXAMINATION:  GEN: Afebrile, NAD  EYE: PERRLA, EOMI, no conjunctival injection  EAR: Right - canals clear and TM w/ nl landmarks, light reflex and mobility and Left - TM red, bulging, no landmarks or light reflex visualized  THROAT: mucous membranes moist, erythema, no exudates seen, no palate petechia and clear post-nasal drainage present  SINUS: no tenderness to palpation  NOSE:clear nasal discharge  NECK: supple and no lymphadenopathy  LUNGS: CTA, no wheezing, crackles or retractions  Skin:  Pink warm dry no rashes noted  Neuro:  Alert active cooperative with exam    A/P:  1. Acute suppurative otitis media of left ear without spontaneous rupture of tympanic membrane, recurrence not specified    2. URI with cough and congestion    3. Pharyngitis, unspecified etiology      New Prescriptions    AMOXICILLIN (AMOXIL) 400 MG/5ML SUSPENSION    Take 11 mLs by mouth 2 times daily for 10 days.      Administrations This Visit     dexamethasone (DECADRON) injection 10 mg     Admin Date  11/09/2019 Action  Given Dose  10 mg Route  Oral Administered By  Ammy Cook RN                Symptomatic treatment discussed.   Tylenol may be used for fever as needed.  Symptoms should resolve in 3 to 5 days. If symptoms persist longer than 7 days or if patient has worsening cough, respiratory distress or if the fever is  persistent, etc to call or return    Santana Willingham, NP   no

## 2022-04-13 NOTE — H&P CARDIOLOGY - NSICDXPASTSURGICALHX_GEN_ALL_CORE_FT
PAST SURGICAL HISTORY:  Cardiac defibrillator in place 2017  Medtronic Visa AF MRI VR Surescan ICD   Model# [id number], SN# [id number]

## 2022-04-13 NOTE — H&P CARDIOLOGY - HISTORY OF PRESENT ILLNESS
Creole ID#. [id number]. Patient is a 51M w/ hx of CAD w/ multivessel stenting, MI w/ restenosis of RCA and LAD (2017), LV apical thrombus (coumadin last dose 4/12/22), HFrEF (EF 40-45%), syncope with 2:1 heart block s/p Medtronic AICD 5/2018, CVA, found to have recurrent LV thrombus with admission 2/2022 for evaluation and LHC.  Pt. presents for further evaluation and brachytherapy.

## 2022-04-13 NOTE — H&P CARDIOLOGY - NSICDXPASTMEDICALHX_GEN_ALL_CORE_FT
PAST MEDICAL HISTORY:  CAD (coronary artery disease)     Former smoker     Heart block 2:1 HB   dual chamber AICD (DDD ) in May 2018 [facility] (Dr. Crook David)    HTN (hypertension)     MI (myocardial infarction) 2 coronary stents    Status post CVA residual rt. lower ext weakness.    Stented coronary artery     Thrombosis apical

## 2022-04-13 NOTE — ASU PATIENT PROFILE, ADULT - NSICDXPASTMEDICALHX_GEN_ALL_CORE_FT
PAST MEDICAL HISTORY:  CAD (coronary artery disease)     Former smoker     Heart block 2:1 HB   dual chamber AICD (DDD ) in May 2018 [facility] (Dr. Crook David)    HTN (hypertension)     MI (myocardial infarction) 2 coronary stents    Status post CVA residual rt. lower ext weakness.    Stented coronary artery     Thrombosis apical

## 2022-04-13 NOTE — ASU PATIENT PROFILE, ADULT - FALL HARM RISK - UNIVERSAL INTERVENTIONS
Bed in lowest position, wheels locked, appropriate side rails in place/Call bell, personal items and telephone in reach/Instruct patient to call for assistance before getting out of bed or chair/Non-slip footwear when patient is out of bed/[place] to call system/Physically safe environment - no spills, clutter or unnecessary equipment/Purposeful Proactive Rounding/Room/bathroom lighting operational, light cord in reach

## 2022-04-13 NOTE — ASU PATIENT PROFILE, ADULT - NSICDXPASTSURGICALHX_GEN_ALL_CORE_FT
PAST SURGICAL HISTORY:  Cardiac defibrillator in place 2017  Medtronic Visa AF MRI VR Surescan ICD   Model# [id number], SN# [id number]

## 2022-04-14 ENCOUNTER — TRANSCRIPTION ENCOUNTER (OUTPATIENT)
Age: 52
End: 2022-04-14

## 2022-04-14 VITALS
DIASTOLIC BLOOD PRESSURE: 70 MMHG | SYSTOLIC BLOOD PRESSURE: 110 MMHG | TEMPERATURE: 98 F | RESPIRATION RATE: 17 BRPM | OXYGEN SATURATION: 98 % | HEART RATE: 67 BPM

## 2022-04-14 LAB
INR BLD: 2.03 RATIO — HIGH (ref 0.88–1.16)
PROTHROM AB SERPL-ACNC: 23.7 SEC — HIGH (ref 10.5–13.4)

## 2022-04-14 RX ADMIN — RANOLAZINE 1000 MILLIGRAM(S): 500 TABLET, FILM COATED, EXTENDED RELEASE ORAL at 07:58

## 2022-04-14 RX ADMIN — SPIRONOLACTONE 25 MILLIGRAM(S): 25 TABLET, FILM COATED ORAL at 05:46

## 2022-04-14 RX ADMIN — LISINOPRIL 5 MILLIGRAM(S): 2.5 TABLET ORAL at 05:46

## 2022-04-14 RX ADMIN — CARVEDILOL PHOSPHATE 12.5 MILLIGRAM(S): 80 CAPSULE, EXTENDED RELEASE ORAL at 07:58

## 2022-04-14 RX ADMIN — CLOPIDOGREL BISULFATE 75 MILLIGRAM(S): 75 TABLET, FILM COATED ORAL at 05:45

## 2022-04-14 NOTE — DISCHARGE NOTE PROVIDER - HOSPITAL COURSE
HPI:  Denia ID#. [id number]. Patient is a 51M w/ hx of CAD w/ multivessel stenting, MI w/ restenosis of RCA and LAD (2017), LV apical thrombus (coumadin last dose 4/12/22), HFrEF (EF 40-45%), syncope with 2:1 heart block s/p Medtronic AICD 5/2018, CVA, found to have recurrent LV thrombus with admission 2/2022 for evaluation and LHC.  Pt. presents for further evaluation and brachytherapy. (13 Apr 2022 12:44).    4/13 cardiac cath with POBA and brachytherapy to the LAD. Right radial access site without swelling, bleeding.

## 2022-04-14 NOTE — DISCHARGE NOTE PROVIDER - NSDCMRMEDTOKEN_GEN_ALL_CORE_FT
atorvastatin 80 mg oral tablet: 1 tab(s) orally once a day (at bedtime)  carvedilol 25 mg oral tablet: 0.5 tab(s) orally every 12 hours  clopidogrel 75 mg oral tablet: 1 tab(s) orally once a day  Coumadin 3 mg oral tablet: 1 tab(s) orally once a day (at bedtime)  gabapentin 100 mg oral tablet: orally once a day (at bedtime)  isosorbide mononitrate 60 mg oral tablet, extended release: 1 tab(s) orally once a day (in the morning)  lisinopril 5 mg oral tablet: 1 tab(s) orally once a day  ranolazine 1000 mg oral tablet, extended release: 1 tab(s) orally 2 times a day   spironolactone 25 mg oral tablet: 1 tab(s) orally once a day

## 2022-04-14 NOTE — DISCHARGE NOTE PROVIDER - NSDCCPCAREPLAN_GEN_ALL_CORE_FT
PRINCIPAL DISCHARGE DIAGNOSIS  Diagnosis: Occlusion of coronary artery  Assessment and Plan of Treatment: Do not stop your aspirin or Plavix unless instructed to do so by your cardiologist, they help keep your stented arteries open.   No heavy lifting or pushing/pulling with procedure arm for 2 weeks. No driving for 2 days. You may shower 24 hours following the procedure but avoid baths/swimming for 1 week. Check your wrist site for bleeding and/or swelling daily following procedure and call your doctor immediately if it occurs or if you experience increased pain at the site. Follow up with your cardiologist in 1-2 weeks. You may call [facility] if you have any questions/concerns regarding your procedure [phone].      SECONDARY DISCHARGE DIAGNOSES  Diagnosis: HTN (hypertension)  Assessment and Plan of Treatment: Continue with your blood pressure medications; eat a heart healthy diet with low salt diet; exercise regularly (consult with your physician or cardiologist first); maintain a heart healthy weight; if you smoke - quit (A resource to help you stop smoking is the [facility] [employer] – phone number [phone].); include healthy ways to manage stress. Continue to follow with your primary care physician or cardiologist.    Diagnosis: HLD (hyperlipidemia)  Assessment and Plan of Treatment: Continue with your cholesterol medications. Eat a heart healthy diet that is low in saturated fats and salt, and includes whole grains, fruits, vegetables and lean protein; exercise regularly (consult with your physician or cardiologist first); maintain a heart healthy weight; if you smoke - quit (A resource to help you stop smoking is the [facility] [employer] – phone number [phone].). Continue to follow with your primary physician or cardiologist.

## 2022-04-14 NOTE — DISCHARGE NOTE NURSING/CASE MANAGEMENT/SOCIAL WORK - PATIENT PORTAL LINK FT
You can access the FollowMyHealth Patient Portal offered by [facility] by registering at the following website: [email or web address]. By joining [employer]’s FollowMyHealth portal, you will also be able to view your health information using other applications (apps) compatible with our system.

## 2022-04-14 NOTE — DISCHARGE NOTE PROVIDER - NSDCPNSUBOBJ_GEN_ALL_CORE
Patient is a 51y old  Male who presents with a chief complaint of arterial occlusion        Allergies    No Known Allergies    Intolerances        Medications:  atorvastatin 80 milliGRAM(s) Oral at bedtime  carvedilol 12.5 milliGRAM(s) Oral every 12 hours  clopidogrel Tablet 75 milliGRAM(s) Oral daily  gabapentin 100 milliGRAM(s) Oral at bedtime  isosorbide   mononitrate ER Tablet (IMDUR) 60 milliGRAM(s) Oral daily  lisinopril 5 milliGRAM(s) Oral daily  ranolazine 1000 milliGRAM(s) Oral two times a day  sodium chloride 0.9%. 1000 milliLiter(s) IV Continuous <Continuous>  spironolactone 25 milliGRAM(s) Oral daily      Vitals:  T(C): 36.7 (22 @ 15:05), Max: 36.9 (22 @ 13:15)  HR: 61 (22 @ 20:35) (61 - 73)  BP: 123/81 (22 @ 20:35) (111/72 - 128/100)  BP(mean): 94 (22 @ 20:35) (84 - 110)  RR: 18 (22 @ 20:35) (14 - 19)  SpO2: 99% (22 @ 20:35) (95% - 99%)  Wt(kg): --  Daily Height in cm: 172.72 (2022 13:15)    Daily Weight in k.8 (2022 12:44)  I&O's Summary    2022 07:01  -  2022 02:18  --------------------------------------------------------  IN: 390 mL / OUT: 600 mL / NET: -210 mL          Physical Exam:  Appearance: Normal  Eyes: PERRL, EOMI  HENT: Normal oral muscosa, NC/AT  Cardiovascular: S1S2, RRR, No M/R/G, no JVD, No Lower extremity edema  Procedural Access Site: No hematoma, Non-tender to palpation, 2+ pulse, No bruit, No Ecchymosis  Respiratory: Clear to auscultation bilaterally  Gastrointestinal: Soft, Non tender, Normal Bowel Sounds  Musculoskeletal: No clubbing, No joint deformity   Neurologic: Non-focal  Lymphatic: No lymphadenopathy  Psychiatry: AAOx3, Mood & affect appropriate  Skin: No rashes, No ecchymoses, No cyanosis        139  |  101  |  11  ----------------------------<  92  4.1   |  25  |  1.07    Ca    9.6      2022 13:22      PT/INR - ( 2022 00:49 )   PT: 23.7 sec;   INR: 2.03 ratio                 Lipid panel   Hgb A1c                         15.5   6.73  )-----------( 211      ( 2022 13:22 )             47.7         ECG: SR 69 bpm    Cath: Monitor radial site for swelling, bleeding  Continue Plavix, Coumadin     HTN: Continue antihypertensive medications with hold parameters     HLD: continue statin, confirm lipid panel results     Imaging:    Interpretation of Telemetry:

## 2022-04-14 NOTE — DISCHARGE NOTE NURSING/CASE MANAGEMENT/SOCIAL WORK - NSDCPEFALRISK_GEN_ALL_CORE
For information on Fall & Injury Prevention, visit: [email or web address] OR  [email or web address] OR  https://www.cdc.gov/steadi/patient.html

## 2022-04-14 NOTE — DISCHARGE NOTE PROVIDER - NSDCCPTREATMENT_GEN_ALL_CORE_FT
PRINCIPAL PROCEDURE  Procedure: Balloon angioplasty of coronary artery  Findings and Treatment: POBA and brachytherapy to the LAD

## 2022-04-14 NOTE — DISCHARGE NOTE PROVIDER - CARE PROVIDER_API CALL
Mulugeta Rush (DO)  Cardiovascular Disease; Internal Medicine  [address] [place] [address]  Phone: [phone]  Fax: [phone]  Follow Up Time: 2 weeks

## 2022-04-25 ENCOUNTER — RESULT CHARGE (OUTPATIENT)
Age: 52
End: 2022-04-25

## 2022-04-26 ENCOUNTER — APPOINTMENT (OUTPATIENT)
Dept: CARDIOLOGY | Facility: CLINIC | Age: 52
End: 2022-04-26
Payer: SELF-PAY

## 2022-04-26 ENCOUNTER — NON-APPOINTMENT (OUTPATIENT)
Age: 52
End: 2022-04-26

## 2022-04-26 VITALS
BODY MASS INDEX: 28.19 KG/M2 | HEIGHT: 68 IN | TEMPERATURE: 99.7 F | HEART RATE: 72 BPM | SYSTOLIC BLOOD PRESSURE: 122 MMHG | WEIGHT: 186 LBS | DIASTOLIC BLOOD PRESSURE: 68 MMHG | OXYGEN SATURATION: 98 %

## 2022-04-26 DIAGNOSIS — R93.5 ABNORMAL FINDINGS ON DIAGNOSTIC IMAGING OF OTHER ABDOMINAL REGIONS, INCLUDING RETROPERITONEUM: ICD-10-CM

## 2022-04-26 PROCEDURE — 99212 OFFICE O/P EST SF 10 MIN: CPT

## 2022-04-26 PROCEDURE — 93000 ELECTROCARDIOGRAM COMPLETE: CPT

## 2022-04-26 NOTE — CARDIOLOGY SUMMARY
[___] : [unfilled] [LVEF ___%] : LVEF [unfilled]% [de-identified] : 11/15/21- sinus 75, normal axis, inferior infarct, precordial ST, lateral T-wave inversion, QTc 396\par 1/21/22- sinus 75, normal axis, inferolateral infarct, precordial ST, lateral T-wave inversion, QTc 386\par 4/26/22- sinus 69, normal axis, inferolateral infarct, precordial ST, lateral TWI, QTc 407 [de-identified] : 11/22/21- LV EF 40-45%, apical aneurysmal with slow flow, 0.6 x 0.7 cm thrombus [de-identified] : 2/26/22- \par Summary:\par  1. Normal left ventricular internal cavity size.\par  2. There is moderate concentric left ventricular hypertrophy.\par  3. Entire apex, basal inferoseptal segment, and basal inferior segment \par are abnormal as described above.\par  4. Ischemic cardiomyopathy.\par  5. Mildly decreased global left ventricular systolic function.\par  6. Left ventricular ejection fraction, by visual estimation, is 40 to \par 45%.\par  7. Normal right ventricular size and function.\par  8. Mild thickening of the anterior and posterior mitral valve leaflets.\par  9. Trace mitral valve regurgitation.\par 10. There is no evidence of pericardial effusion.\par 11. Endocardial visualization was enhanced with intravenous echo contrast. [de-identified] : 2/28/22- VENTRICLES: No LV gram was performed; however, a recent echocardiogram\par demonstrated an EF of 45 %.\par CORONARY VESSELS: The coronary circulation is right dominant.\par LM:   --  LM: Normal.\par LAD:   --  Mid LAD: There was a diffuse 90 % stenosis at the site of a\par prior stent, in-stent. There was LOLIS grade 3 flow through the vessel\par (brisk flow).\par --  Distal LAD: There was a diffuse 80 % stenosis in-stent.\par --  D1: Normal.\par CX:   --  Circumflex: The vessel was medium to large sized.\par --  Mid circumflex: There was a diffuse 10 % stenosis at the site of a\par prior stent.\par RCA:   --  RCA: The vessel arose abnormally high from the right sinus of\par Valsalva.\par --  Mid RCA: There was a diffuse 20 % stenosis at the site of a prior\par stent.\par COMPLICATIONS: No complications occurred during the cath lab visit.\par DIAGNOSTIC IMPRESSIONS: Extensive instent restenosis in mLAD in atleast 2\par layers of stents.\par Prior stents in RCA and LCX patent\par Aortic valve not crossed and ventriculography not performed due to the\par presence of an LV thrombus (11/21)\par DIAGNOSTIC RECOMMENDATIONS: Medical management of CAD and angina with\par optimal medical therapy\par Consider Brachytherapy as a possible treatment modality\par INTERVENTIONAL IMPRESSIONS: Extensive instent restenosis in mLAD in atleast\par 2 layers of stents.\par Prior stents in RCA and LCX patent\par Aortic valve not crossed and ventriculography not performed due to the\par presence of an LV thrombus (11/21)\par INTERVENTIONAL RECOMMENDATIONS: Medical management of CAD and angina with\par optimal medical therapy\par Consider Brachytherapy as a possible treatment modality\par \par 4/13/22- \par \par Following intervention there is a 80 % residual stenosis. There was\par LOLIS Flow 3 before the procedure and LOLIS Flow 3 following\par the procedure. Following intervention there is a partially improved\par angiographic appearance.

## 2022-04-26 NOTE — DISCUSSION/SUMMARY
[FreeTextEntry1] : 51M Creole-speaking man with history of CAD with multivessel stentings and MI (2017 at [facility] with restenosis of RCA and LAD s/p PEPE to mRCA), LV apical thrombus (previously been on warfarin since 2017 discontinued on 2020), HFrEF (35-40% in 2018), syncope with 2:1 heart block s/p Medtronic AICD 5/2018 (DDD ), CVA (6/2019) and with unstable angina in 11/2019 s/p PCI/stent to mLCx at [facility], previously follows at [facility] with Dr. Crook but since left practice without follow up, presented to [facility] on 9/2/20 with unstable angina, serial Troponin negative, repeaT TTE with EF 40-45% (upon my own review) and resolved LV apical thrombus (known since 11/2019 but unclear reason remains on warfarin and self discontinued clopidogrel 75mg since 7/2019), underwent LHC found with patent stents in LCx, RCA and moderate-severe instent restenosis of [id number] LAD, given small caliber size of the vessel decided medical management with antianginal (increased Imdur to 120mg), presented for initial outpatient cardiology evaluation on 9/15/20, added Ranexa to Imdur for severe angina, still with refractory symptoms and he presented again to [facility] on 9/23/20, underwent PCI/PEPE x2 to dLAD for 95% severe instent restenosis, repeat TTE off warfarin found with apical akinesis/aneurysm with calcified mural thrombus, LV EF 40-45%, had 21 beats WCT, increased carvedilol dose to 25mg BID, discharged home on 9/24/20, prior visit 5/2021 after returned back from [facility] with borderline SBP redcued carvedilol dose to 12.5mg, last visit 11/2021 interval with recurrent CVAs had repeat TTE with Definity confirmed recurrence of LV apical thrombus, restarted back on warfarin, last seen in office 1/2022, interval readmitted to [facility] 2/28/22 for chest pain, CTA done with probable focal dissection at the distal abdominal aorta, repeat LHC shown extensive instent restenosis in mLAD with prior 2 layers of stent, patent RCA and LCx stents, patient referred for brachytherapy at [facility] with Dr. Hannah Cheatham found 90% ISR at mLAD unable to deliver catheter to the distal segment with partial improvement of proximal half of the vessel (80% mLAD), presents for follow up.  \par \par Chronic angina unfortunately no interventional option possible, not candidate for CABG as no distal target, continue antianginals. EKG unchanged old infarct. \par \par Prior CVA in setting of recurrent LV thrombus when off anticoagulant, resumed on warfarin with therapeutic monthly INR checks, need level for this month and recheck CBC/CMP level.  Chronic angina with EKG unchanged. Recurrent angina despite prior PCI/PEPE, continue current antianginals increase Ranexa to 1000mg BID as reports dizziness with higher dose of Imdur use, advised to take sublingual nitro PRN, carvedilol 12.5mg BID given reports of dizziness/borderline SBP 90-100s at time. AICD interrogated today no VT/VF event and with 7.3 years battery life. \par \par \par 1. CAD with multiple stents and instent-restenosis of dLAD s/p PEPE x2 9/2020 and failed brachytherapy 4/2022- off ASA and continue clopidogrel with warfarin; continue carvedilol to 12.5mg BID and continue Imdur 30mg and Ranexa 1000mg BID, advised to use sublingual nitrate PRN more often as needed, Continue high dose atorvastatin 80mg. \par \par 2. Ischemic cardiomyopathy, HFrEF- ACC/AHA stage B- continue carvedilol 12.5g BID and lisinopril to 5mg due to borderline low BP, continue spironolactone 25mg. \par \par 3. LV apical thrombus with recent CVA- was off warfarin since 2020, repeat TTE with echocontrast with recurrence of LV thrombus due to aneurysmal region, continue warfarin 3mg INR goal 2-3 indefinitely, recent repeat Echo with resolution of thrombus but high risk of recurrence given aneurysmal segment.  \par \par 4. History of 2:1 heart block, prior NSVT- AICD interrogated during hospitalization; continue carvedilol, EPS referral for ICD monitoring. \par \par 5. Recent CTA abdomen questionable distal focal dissection?- obtain abdominal aortic Duplex \par \par Strongly advised COVID vaccination again. \par \par \par Follow up in 3 months. \par \par

## 2022-04-26 NOTE — HISTORY OF PRESENT ILLNESS
[FreeTextEntry1] : 51M Creole-speaking man with history of CAD with multivessel stentings and MI (2017 at [facility] with restenosis of RCA and LAD s/p PEPE to mRCA), LV apical thrombus (previously been on warfarin since  discontinued on ), HFrEF (35-40% in ), syncope with 2:1 heart block s/p Medtronic AICD 2018 (DDD ), CVA (2019) and with unstable angina in 2019 s/p PCI/stent to mLCx at [facility], previously follows at [facility] with Dr. Crook but since left practice without follow up, presented to [facility] on 20 with unstable angina, serial Troponin negative, repeaT TTE with EF 40-45% (upon my own review) and resolved LV apical thrombus (known since 2019 but unclear reason remains on warfarin and self discontinued clopidogrel 75mg since 2019), underwent LHC found with patent stents in LCx, RCA and moderate-severe instent restenosis of [id number] LAD, given small caliber size of the vessel decided medical management with antianginal (increased Imdur to 120mg), presented for initial outpatient cardiology evaluation on 9/15/20, added Ranexa to Imdur for severe angina, still with refractory symptoms and he presented again to [facility] on 20, underwent PCI/PEPE x2 to dLAD for 95% severe instent restenosis, repeat TTE off warfarin found with apical akinesis/aneurysm with calcified mural thrombus, LV EF 40-45%, had 21 beats WCT, increased carvedilol dose to 25mg BID, discharged home on 20, prior visit 2021 after returned back from [facility] with borderline SBP redcued carvedilol dose to 12.5mg, last visit 2021 interval with recurrent CVAs had repeat TTE with Definity confirmed recurrence of LV apical thrombus, restarted back on warfarin, last seen in office 2022, interval readmitted to [facility] 22 for chest pain, CTA done with probable focal dissection at the distal abdominal aorta, repeat LHC shown extensive instent restenosis in mLAD with prior 2 layers of stent, patent RCA and LCx stents, patient referred for brachytherapy at [facility] with Dr. Hannah Cheatham found 90% ISR at mLAD unable to deliver catheter to the distal segment with partial improvement of proximal half of the vessel (80% mLAD), presents for follow up.  \par \par Patient again presents with his partner for the visit. \par Still having daily anginal chest pain at rest lasting for 15-20 minutes, has not used sublingual nitroglycerin but can lead to low SBP 90s and dizziness. Been adherent with all of his other medications. No ICD shock or syncope, has intermittent palpitations. \par \par Continues to decline COVID vacccine due to not feeling with dizziness, bodyaches\par \par \par Prior visit 2022: \par Still has chronic angina, takes sublingual only once. Has labile BP with high readings SBP 170s but then would be low after nitroglycerin use. Denies bleeding on warfarin/clopidogrel use. \par \par Still not yet obtain COVID vaccine. \par \par Prior visit 2021: \par He's been living in [place] with his significant other, had been hospitalized twice there, 1st episode episode in 2021 for chest pain had Echo and nuclear stress test done showed no ischemia, 2nd admission 10/2021 for acute stroke symptoms (facial droop and L-sided weakness for few hours), symptoms completely resolved once in the ER, noted to be hypertensive 184/104, did not undergo MRI brain cause he was claustrophobic but repeat CT head done no new infarct, LDL 89 increased atorvastatin to 80mg. \par \par Been feeling well since returning back to [place], still has chronic angina at rest, able to walk for 30 minutes, still with nonspecific paresthesia sensation of hotness in his body and feeling dizzy after his medications use. \par \par Remains hesitant about COVID vaccine. \par \par Prior visit 2021\par Patient presents with is sister-in-law requesting her to interpret. \par \par He left for [facility] since 10/2020 and just returned this week, still with intermittent chronic angina, had one episode yesterday lasted for over 1 hour, does not take sublingual nitro as reports cause his BP to be high, but when take his meds would see SBP 90-100s and would take Imdur 30 BID instead, self paying for all his medications. Having intermittent palpitations lasting few seconds and lasted for few hours, denies ICD shock. Currently with his travel visa expiring in July. \par \par Has not yet schedule for the COVID vaccine due to concern from his wife. \par \par \par Prior visit 2020:\par He reports still having chest pain daily and sometime can last throughout the day, has not tried sublingual nitro. Sometimes feel as fire sensation, doesn't take any PPI/antacids. He's planning to return to [facility] for 4-5 months to resume PT for his R-leg from prior stroke. \par \par Prior visit 9/15/20:\par Patient reports SBP 80-90s with dizziness at times when he uses Imdur 120mg, continues to have L-sided chest pain but mainly at night and in the morning, at times also with ambulation, typically last for about 30 minutes. He remains compliant with ASA/clopidogrel, off warfarin. He is planning ot return to [facility] in about 1-2 month as his visa is about to be . \par \par \par Lipid panel:\par TG 58, , HDL 39, LDL 54

## 2022-04-29 ENCOUNTER — LABORATORY RESULT (OUTPATIENT)
Age: 52
End: 2022-04-29

## 2022-05-11 ENCOUNTER — APPOINTMENT (OUTPATIENT)
Dept: CARDIOLOGY | Facility: CLINIC | Age: 52
End: 2022-05-11
Payer: SELF-PAY

## 2022-05-11 PROCEDURE — 93978 VASCULAR STUDY: CPT

## 2022-05-23 ENCOUNTER — APPOINTMENT (OUTPATIENT)
Dept: ELECTROPHYSIOLOGY | Facility: CLINIC | Age: 52
End: 2022-05-23
Payer: SELF-PAY

## 2022-05-23 VITALS
OXYGEN SATURATION: 96 % | DIASTOLIC BLOOD PRESSURE: 66 MMHG | TEMPERATURE: 98.7 F | BODY MASS INDEX: 28.19 KG/M2 | HEIGHT: 68 IN | WEIGHT: 186 LBS | SYSTOLIC BLOOD PRESSURE: 108 MMHG | HEART RATE: 76 BPM

## 2022-05-23 DIAGNOSIS — N40.0 BENIGN PROSTATIC HYPERPLASIA WITHOUT LOWER URINARY TRACT SYMPMS: ICD-10-CM

## 2022-05-23 PROCEDURE — 93000 ELECTROCARDIOGRAM COMPLETE: CPT

## 2022-05-23 PROCEDURE — 99214 OFFICE O/P EST MOD 30 MIN: CPT

## 2022-05-23 NOTE — ASSESSMENT
[FreeTextEntry1] : 51M Creole-speaking man (family member helped translating) with history of CAD with prior MI, and current angina and multivessel stenting with extensive PCI history, LV apical thrombus (on life long warfarin now), HFrEF (around 40%), syncope with 2:1 heart block s/p DC Medtronic ICD 5/2018, CVA (6/2019 due to LV clot) \par \par Patient history is well summarized in Dr. Rush’s note and was fully reviewed. He is referred to establish care with EP for routine ICD follow up and check.

## 2022-05-23 NOTE — PROCEDURE
[No] : not [NSR] : normal sinus rhythm [ICD] : Implantable cardioverter-defibrillator [VVI] : VVI [Longevity: ___ months] : The estimated remaining battery life is [unfilled] months [Lead Imp:  ___ohms] : lead impedance was [unfilled] ohms [Sensing Amplitude ___mv] : sensing amplitude was [unfilled] mv [___V @] : [unfilled] V [___ ms] : [unfilled] ms [None] : none [de-identified] : medtronic  [de-identified] : 5/16/2018 [de-identified] : 4 [de-identified] : 40 [de-identified] : 7.6 years  [de-identified] :  0.1%\par NO VT/VF since implant\par VVI 40\par VF ZONE STARTS  BPM\par VT monitor zone starts at 150 bpm

## 2022-05-23 NOTE — REASON FOR VISIT
[New Patient Device Check] : is here today for a new patient device check visit for [Family Member] : family member

## 2022-05-23 NOTE — HISTORY OF PRESENT ILLNESS
[de-identified] : 51M Creole-speaking man (family member helped translating) with history of CAD with prior MI, and current angina and multivessel stenting with extensive PCI history, LV apical thrombus (on life long warfarin now), HFrEF (around 40%), syncope with 2:1 heart block s/p DC Medtronic ICD 2018, CVA (2019 due to LV clot) \par \par Patient history is well summarized in Dr. Rush’s note and was fully reviewed. He is referred to establish care with EP for routine ICD follow up and check. \par \par ROS:\par -Daily stable CP. \par - Occasional palpitation that only last for few seconds.\par - Rare dizziness but no david syncope or ICD shocks. \par \par \par Social history:\par Negative for smoking, illicit drugs or alcohol abuse.\par \par Family history:\par Father  from heart attack at age 47. Sister  from a stroke at age 66.  \par \par \par TESTS:\par EKG 22: SR, PRPW, inferior Q wave\par EK20- Sinus 67, normal axis, inferior infarct, lateral TWI, QTc 4029/15/20- Sinus 64, normal axis, inferior and anterolateral infarct, diffuse T-wave inversion, QTc 463 \par \par Echo: 20- Summary: 1. Technically adequate study. 2. Endocardium opacified with echocontrast (Definity). 3. Left ventricular ejection fraction, by visual estimation, is 40 to 45%. 4. Mildly to moderately decreased segmental left ventricular systolic function. 5. There is akinesis/aneurysmal apex and distal LV segments and akinesis of the basal inferior wall. 6. There is moderate concentric left ventricular hypertrophy. 7. Calcified lesion attached to the LV apical wall suggestive of old calcifed mural thrombus, echocontrast images without evidence of filling defect. 8. There is no evidence of pericardial effusion. 9. Compared to the prior TTE study from 9/3/20, segmental wall abnormalities unchanged and LV systolic function/EF mild-moderately reduced consistent with underlying cardiomyopathy. Calcified apical mural thrombus noted on noncontrast images.20- Summary: 1. Endocardial visualization was enhanced with intravenous echo contrast. 2. Left ventricular ejection fraction, by visual estimation, is 55 to 60%. 3. Normal global left ventricular systolic function. 4. Multiple left ventricular regional wall motion abnormalities exist. See wall motion findings. 5. Normal left ventricular internal cavity size. 6. There is moderate concentric left ventricular hypertrophy. 7. Normal left atrial size. 8. Normal right atrial size. 9. Mild thickening of the anterior and posterior mitral valve leaflets.10. Trace mitral valve regurgitation.11. Sclerotic aortic valve with normal opening.12. There is no evidence of pericardial effusion. LVEF 40-45% upon my own review%. \par \par Cardiac Cath: 20- CORONARY VESSELS: The coronary circulation is right dominant. \par LM: -- LM: Normal.\par LAD: -- Distal LAD: There was a diffuse 95 % stenosis in-stent.\par CX: -- Proximal circumflex: There was a 20 % stenosis.\par RCA: -- RCA: This vessel was not injected.\par COMPLICATIONS: No complications occurred during the cath lab visit.\par DIAGNOSTIC IMPRESSIONS: Severe LAD instent restenosis. PCI performed with 2DES.DIAGNOSTIC RECOMMENDATIONS: Aspirin and plavix. \par INTERVENTIONAL IMPRESSIONS: Severe LAD instent restenosis. PCI performed with 2 PEPE.20- CORONARY VESSELS: The coronary circulation is right dominant. LM: -- LM: Normal.\par LAD: -- Mid LAD: There was a 20 % stenosis. In a second lesion, there was a diffuse 70 % stenosis in-stent.-- Distal LAD: There was a diffuse 70 % stenosis in-stent. In a second lesion, there was a 80 % stenosis.\par CX: -- Mid circumflex: There was a 0 % stenosis in-stent.\par RCA: -- Mid RCA: There was a 20 % stenosis in-stent. Superior take off. \par COMPLICATIONS: No complications occurred during the cath lab visit.DIAGNOSTIC IMPRESSIONS: Patent RCA and circumflex stents. Diffuse mid to distal LAD instent restenosis in a 2.25/2.5 mm stents. DIAGNOSTIC RECOMMENDATIONS: Considering small caliber vessel, diffuse nature of disease, patient needs to be optimized on antianginal therapy. If fails, will consider laser atherectomy/PCI to LAD. Would require 40-50mm of small diameter stents to address diffuse disease. \par

## 2022-05-23 NOTE — PHYSICAL EXAM
[General Appearance - Well Developed] : well developed [General Appearance - Well Nourished] : well nourished [Heart Rate And Rhythm] : heart rate and rhythm were normal [Heart Sounds] : normal S1 and S2 [Murmurs] : no murmurs present [] : no respiratory distress [Exaggerated Use Of Accessory Muscles For Inspiration] : no accessory muscle use [Left Infraclavicular] : left infraclavicular area [Clean] : clean [Dry] : dry [Well-Healed] : well-healed

## 2022-05-23 NOTE — DISCUSSION/SUMMARY
[FreeTextEntry1] : ICD is working well with no VT or VF events and low pacing burden. He is not on home monitoring and like to start this. \par - If can do remote monitoring then follow up in the office in 9 months, otherwise will see him in 3 months.\par - Will do one week MCOT prior to next visit.

## 2022-05-26 ENCOUNTER — RESULT CHARGE (OUTPATIENT)
Age: 52
End: 2022-05-26

## 2022-05-30 PROCEDURE — 85027 COMPLETE CBC AUTOMATED: CPT

## 2022-05-30 PROCEDURE — 77370 RADIATION PHYSICS CONSULT: CPT

## 2022-05-30 PROCEDURE — 92920 PRQ TRLUML C ANGIOP 1ART&/BR: CPT | Mod: LD

## 2022-05-30 PROCEDURE — 80048 BASIC METABOLIC PNL TOTAL CA: CPT

## 2022-05-30 PROCEDURE — 77316 BRACHYTX ISODOSE PLAN SIMPLE: CPT

## 2022-05-30 PROCEDURE — C1717: CPT

## 2022-05-30 PROCEDURE — C1725: CPT

## 2022-05-30 PROCEDURE — 77470 SPECIAL RADIATION TREATMENT: CPT

## 2022-05-30 PROCEDURE — C1769: CPT

## 2022-05-30 PROCEDURE — C1894: CPT

## 2022-05-30 PROCEDURE — 77290 THER RAD SIMULAJ FIELD CPLX: CPT

## 2022-05-30 PROCEDURE — 85610 PROTHROMBIN TIME: CPT

## 2022-05-30 PROCEDURE — 99153 MOD SED SAME PHYS/QHP EA: CPT

## 2022-05-30 PROCEDURE — 92974 CATH PLACE CARDIO BRACHYTX: CPT

## 2022-05-30 PROCEDURE — 99152 MOD SED SAME PHYS/QHP 5/>YRS: CPT

## 2022-05-30 PROCEDURE — C1728: CPT

## 2022-05-30 PROCEDURE — C1887: CPT

## 2022-05-30 PROCEDURE — 77770 HDR RDNCL NTRSTL/ICAV BRCHTX: CPT

## 2022-05-30 PROCEDURE — 93005 ELECTROCARDIOGRAM TRACING: CPT

## 2022-06-06 ENCOUNTER — LABORATORY RESULT (OUTPATIENT)
Age: 52
End: 2022-06-06

## 2022-07-26 ENCOUNTER — APPOINTMENT (OUTPATIENT)
Dept: CARDIOLOGY | Facility: CLINIC | Age: 52
End: 2022-07-26

## 2022-07-26 ENCOUNTER — NON-APPOINTMENT (OUTPATIENT)
Age: 52
End: 2022-07-26

## 2022-07-26 VITALS
OXYGEN SATURATION: 99 % | WEIGHT: 187.4 LBS | HEIGHT: 68 IN | TEMPERATURE: 97.6 F | DIASTOLIC BLOOD PRESSURE: 68 MMHG | BODY MASS INDEX: 28.4 KG/M2 | SYSTOLIC BLOOD PRESSURE: 106 MMHG | HEART RATE: 67 BPM

## 2022-07-26 DIAGNOSIS — I20.9 ANGINA PECTORIS, UNSPECIFIED: ICD-10-CM

## 2022-07-26 LAB
INR PPP: 2.45 RATIO
PT BLD: 28.7 SEC

## 2022-07-26 PROCEDURE — 99214 OFFICE O/P EST MOD 30 MIN: CPT

## 2022-07-26 PROCEDURE — 93000 ELECTROCARDIOGRAM COMPLETE: CPT

## 2022-07-26 RX ORDER — TAMSULOSIN HYDROCHLORIDE 0.4 MG/1
0.4 CAPSULE ORAL
Refills: 0 | Status: DISCONTINUED | COMMUNITY
End: 2022-07-26

## 2022-07-26 RX ORDER — LISINOPRIL 5 MG/1
5 TABLET ORAL DAILY
Qty: 90 | Refills: 3 | Status: DISCONTINUED | COMMUNITY
Start: 2020-09-14 | End: 2022-07-26

## 2022-07-26 NOTE — DISCUSSION/SUMMARY
[FreeTextEntry1] : 51M Creole-speaking man with history of CAD with multivessel stentings and MI (2017 at [facility] with restenosis of RCA and LAD s/p PEPE to mRCA), LV apical thrombus (previously been on warfarin since 2017 discontinued on 2020), HFrEF (35-40% in 2018), syncope with 2:1 heart block s/p Medtronic AICD 5/2018 (DDD ), CVA (6/2019) and with unstable angina in 11/2019 s/p PCI/stent to mLCx at [facility], previously follows at [facility] with Dr. Crook but since left practice without follow up, presented to [facility] on 9/2/20 with unstable angina, serial Troponin negative, repeaT TTE with EF 40-45% (upon my own review) and resolved LV apical thrombus (known since 11/2019 but unclear reason remains on warfarin and self discontinued clopidogrel 75mg since 7/2019), underwent LHC found with patent stents in LCx, RCA and moderate-severe instent restenosis of [id number] LAD, given small caliber size of the vessel decided medical management with antianginal (increased Imdur to 120mg), presented for initial outpatient cardiology evaluation on 9/15/20, added Ranexa to Imdur for severe angina, still with refractory symptoms and he presented again to [facility] on 9/23/20, underwent PCI/PEPE x2 to dLAD for 95% severe instent restenosis, repeat TTE off warfarin found with apical akinesis/aneurysm with calcified mural thrombus, LV EF 40-45%, had 21 beats WCT, increased carvedilol dose to 25mg BID, discharged home on 9/24/20, prior visit 5/2021 after returned back from [facility] with borderline SBP redcued carvedilol dose to 12.5mg, last visit 11/2021 interval with recurrent CVAs had repeat TTE with Definity confirmed recurrence of LV apical thrombus, restarted back on warfarin, last seen in office 1/2022, interval readmitted to [facility] 2/28/22 for chest pain, CTA done with probable focal dissection at the distal abdominal aorta, repeat LHC shown extensive instent restenosis in mLAD with prior 2 layers of stent, patent RCA and LCx stents, patient referred for brachytherapy at [facility] with Dr. Hannah Cheatham found 90% ISR at mLAD unable to deliver catheter to the distal segment with partial improvement of proximal half of the vessel (80% mLAD), last visit 4/2022 still with recurrent stable anginal symptoms, interval followed with EPS/Dr. Barfield, presents for follow up.\par \par No change in stable chronic anginal symptoms, reiterated that unfortunately no interventional option possible, not candidate for CABG as no distal target, continue antianginals and reassured no need to be overtly concern about anginal chest pain. EKG unchanged old infarct. \par \par Prior CVA in setting of recurrent LV thrombus when off anticoagulant, resumed on warfarin with therapeutic monthly INR checks, need level for this month and recheck CBC/CMP level.  Chronic angina with EKG unchanged. Recurrent angina despite prior PCI/PEPE, continue current antianginals increase Ranexa to 1000mg BID as reports dizziness with higher dose of Imdur use, advised to take sublingual nitro PRN, carvedilol 12.5mg BID given reports of dizziness/borderline SBP 90-100s at time. AICD interrogated today no VT/VF event and with 7.3 years battery life. \par \par \par 1. CAD with multiple stents and instent-restenosis of dLAD s/p PEPE x2 9/2020 and failed brachytherapy 4/2022- off ASA and continue clopidogrel with warfarin; continue carvedilol to 12.5mg BID and continue Imdur 30mg and Ranexa 1000mg BID, advised to use sublingual nitrate PRN more often as needed limited by low BP effect, Continue high dose atorvastatin 80mg. \par \par 2. Ischemic cardiomyopathy, HFrEF- ACC/AHA stage B- continue carvedilol 12.5g BID and lisinopril to 5mg due to borderline low BP, continue spironolactone 25mg. \par \par 3. LV apical thrombus with recent CVA- was off warfarin since 2020, repeat TTE with echocontrast with recurrence of LV thrombus due to aneurysmal region, continue warfarin INR goal 2-3 indefinitely, recent repeat Echo with resolution of thrombus but high risk of recurrence given aneurysmal segment.  \par \par 4. History of 2:1 heart block, prior NSVT- AICD interrogated during hospitalization; continue carvedilol, EPS referral for ICD monitoring. \par \par 5. Recent CTA abdomen questionable distal focal dissection?-  abdominal aortic Duplex confirmed findings in 5/2022, currently no abdominal discomfort, did not see vascular surgery. \par \par \par Strongly advised COVID vaccination again. \par \par \par Follow up in 4 months. \par \par  [EKG obtained to assist in diagnosis and management of assessed problem(s)] : EKG obtained to assist in diagnosis and management of assessed problem(s)

## 2022-07-26 NOTE — CARDIOLOGY SUMMARY
[___] : [unfilled] [LVEF ___%] : LVEF [unfilled]% [de-identified] : 11/15/21- sinus 75, normal axis, inferior infarct, precordial ST, lateral T-wave inversion, QTc 396\par 1/21/22- sinus 75, normal axis, inferolateral infarct, precordial ST, lateral T-wave inversion, QTc 386\par 4/26/22- sinus 69, normal axis, inferolateral infarct, precordial ST, lateral TWI, QTc 407\par 7/26/22- sinus 61, normal axis, inferolateral infarct, precordial ST elevation, lateral TWI, QTc 409 [de-identified] : 11/22/21- LV EF 40-45%, apical aneurysmal with slow flow, 0.6 x 0.7 cm thrombus [de-identified] : 2/26/22- \par Summary:\par  1. Normal left ventricular internal cavity size.\par  2. There is moderate concentric left ventricular hypertrophy.\par  3. Entire apex, basal inferoseptal segment, and basal inferior segment \par are abnormal as described above.\par  4. Ischemic cardiomyopathy.\par  5. Mildly decreased global left ventricular systolic function.\par  6. Left ventricular ejection fraction, by visual estimation, is 40 to \par 45%.\par  7. Normal right ventricular size and function.\par  8. Mild thickening of the anterior and posterior mitral valve leaflets.\par  9. Trace mitral valve regurgitation.\par 10. There is no evidence of pericardial effusion.\par 11. Endocardial visualization was enhanced with intravenous echo contrast. [de-identified] : 2/28/22- VENTRICLES: No LV gram was performed; however, a recent echocardiogram\par demonstrated an EF of 45 %.\par CORONARY VESSELS: The coronary circulation is right dominant.\par LM:   --  LM: Normal.\par LAD:   --  Mid LAD: There was a diffuse 90 % stenosis at the site of a\par prior stent, in-stent. There was LOLIS grade 3 flow through the vessel\par (brisk flow).\par --  Distal LAD: There was a diffuse 80 % stenosis in-stent.\par --  D1: Normal.\par CX:   --  Circumflex: The vessel was medium to large sized.\par --  Mid circumflex: There was a diffuse 10 % stenosis at the site of a\par prior stent.\par RCA:   --  RCA: The vessel arose abnormally high from the right sinus of\par Valsalva.\par --  Mid RCA: There was a diffuse 20 % stenosis at the site of a prior\par stent.\par COMPLICATIONS: No complications occurred during the cath lab visit.\par DIAGNOSTIC IMPRESSIONS: Extensive instent restenosis in mLAD in atleast 2\par layers of stents.\par Prior stents in RCA and LCX patent\par Aortic valve not crossed and ventriculography not performed due to the\par presence of an LV thrombus (11/21)\par DIAGNOSTIC RECOMMENDATIONS: Medical management of CAD and angina with\par optimal medical therapy\par Consider Brachytherapy as a possible treatment modality\par INTERVENTIONAL IMPRESSIONS: Extensive instent restenosis in mLAD in atleast\par 2 layers of stents.\par Prior stents in RCA and LCX patent\par Aortic valve not crossed and ventriculography not performed due to the\par presence of an LV thrombus (11/21)\par INTERVENTIONAL RECOMMENDATIONS: Medical management of CAD and angina with\par optimal medical therapy\par Consider Brachytherapy as a possible treatment modality\par \par 4/13/22- \par \par Following intervention there is a 80 % residual stenosis. There was\par LOLIS Flow 3 before the procedure and LOLIS Flow 3 following\par the procedure. Following intervention there is a partially improved\par angiographic appearance.

## 2022-07-26 NOTE — HISTORY OF PRESENT ILLNESS
[FreeTextEntry1] : 51M Creole-speaking man with history of CAD with multivessel stentings and MI (2017 at [facility] with restenosis of RCA and LAD s/p PEPE to mRCA), LV apical thrombus (previously been on warfarin since  discontinued on ), HFrEF (35-40% in ), syncope with 2:1 heart block s/p Medtronic AICD 2018 (DDD ), CVA (2019) and with unstable angina in 2019 s/p PCI/stent to mLCx at [facility], previously follows at [facility] with Dr. Crook but since left practice without follow up, presented to [facility] on 20 with unstable angina, serial Troponin negative, repeaT TTE with EF 40-45% (upon my own review) and resolved LV apical thrombus (known since 2019 but unclear reason remains on warfarin and self discontinued clopidogrel 75mg since 2019), underwent LHC found with patent stents in LCx, RCA and moderate-severe instent restenosis of [id number] LAD, given small caliber size of the vessel decided medical management with antianginal (increased Imdur to 120mg), presented for initial outpatient cardiology evaluation on 9/15/20, added Ranexa to Imdur for severe angina, still with refractory symptoms and he presented again to [facility] on 20, underwent PCI/PEPE x2 to dLAD for 95% severe instent restenosis, repeat TTE off warfarin found with apical akinesis/aneurysm with calcified mural thrombus, LV EF 40-45%, had 21 beats WCT, increased carvedilol dose to 25mg BID, discharged home on 20, prior visit 2021 after returned back from [facility] with borderline SBP redcued carvedilol dose to 12.5mg, last visit 2021 interval with recurrent CVAs had repeat TTE with Definity confirmed recurrence of LV apical thrombus, restarted back on warfarin, last seen in office 2022, interval readmitted to [facility] 22 for chest pain, CTA done with probable focal dissection at the distal abdominal aorta, repeat LHC shown extensive instent restenosis in mLAD with prior 2 layers of stent, patent RCA and LCx stents, patient referred for brachytherapy at [facility] with Dr. Hannah Cheatham found 90% ISR at mLAD unable to deliver catheter to the distal segment with partial improvement of proximal half of the vessel (80% mLAD), last visit 2022 still with recurrent stable anginal symptoms, interval followed with EPS/Dr. Barfield, presents for follow up.  \par \par Monthly INR been therapeutic 2.4\par \par Patient presents with his spouse for the visit. \par Still having recurrent resting chest pain at rest can last for 30 minutes and at times up to 2 days, denies pain with ambulation can walk up to 30 minutes, had tried sublingual nitro no effect except dropping his BP. Denies bleeding on warfarin and clopidogrel use. Reports intermittent cough for 2 months concern if its from lisinopril use. \par \par \par \par Prior visit 2022: \par Patient again presents with his partner for the visit. \par Still having daily anginal chest pain at rest lasting for 15-20 minutes, has not used sublingual nitroglycerin but can lead to low SBP 90s and dizziness. Been adherent with all of his other medications. No ICD shock or syncope, has intermittent palpitations. \par \par Continues to decline COVID vacccine due to not feeling with dizziness, bodyaches\par \par \par Prior visit 2022: \par Still has chronic angina, takes sublingual only once. Has labile BP with high readings SBP 170s but then would be low after nitroglycerin use. Denies bleeding on warfarin/clopidogrel use. \par \par Still not yet obtain COVID vaccine. \par \par Prior visit 2021: \par He's been living in [place] with his significant other, had been hospitalized twice there, 1st episode episode in 2021 for chest pain had Echo and nuclear stress test done showed no ischemia, 2nd admission 10/2021 for acute stroke symptoms (facial droop and L-sided weakness for few hours), symptoms completely resolved once in the ER, noted to be hypertensive 184/104, did not undergo MRI brain cause he was claustrophobic but repeat CT head done no new infarct, LDL 89 increased atorvastatin to 80mg. \par \par Been feeling well since returning back to [place], still has chronic angina at rest, able to walk for 30 minutes, still with nonspecific paresthesia sensation of hotness in his body and feeling dizzy after his medications use. \par \par Remains hesitant about COVID vaccine. \par \par Prior visit 2021\par Patient presents with is sister-in-law requesting her to interpret. \par \par He left for [facility] since 10/2020 and just returned this week, still with intermittent chronic angina, had one episode yesterday lasted for over 1 hour, does not take sublingual nitro as reports cause his BP to be high, but when take his meds would see SBP 90-100s and would take Imdur 30 BID instead, self paying for all his medications. Having intermittent palpitations lasting few seconds and lasted for few hours, denies ICD shock. Currently with his travel visa expiring in July. \par \par Has not yet schedule for the COVID vaccine due to concern from his wife. \par \par \par Prior visit 2020:\par He reports still having chest pain daily and sometime can last throughout the day, has not tried sublingual nitro. Sometimes feel as fire sensation, doesn't take any PPI/antacids. He's planning to return to [facility] for 4-5 months to resume PT for his R-leg from prior stroke. \par \par Prior visit 9/15/20:\par Patient reports SBP 80-90s with dizziness at times when he uses Imdur 120mg, continues to have L-sided chest pain but mainly at night and in the morning, at times also with ambulation, typically last for about 30 minutes. He remains compliant with ASA/clopidogrel, off warfarin. He is planning ot return to [facility] in about 1-2 month as his visa is about to be . \par \par \par Lipid panel:\par TG 58, , HDL 39, LDL 54

## 2022-08-22 ENCOUNTER — NON-APPOINTMENT (OUTPATIENT)
Age: 52
End: 2022-08-22

## 2022-08-22 ENCOUNTER — APPOINTMENT (OUTPATIENT)
Dept: ELECTROPHYSIOLOGY | Facility: CLINIC | Age: 52
End: 2022-08-22
Payer: SELF-PAY

## 2022-08-22 ENCOUNTER — APPOINTMENT (OUTPATIENT)
Dept: ELECTROPHYSIOLOGY | Facility: CLINIC | Age: 52
End: 2022-08-22

## 2022-08-22 VITALS
DIASTOLIC BLOOD PRESSURE: 67 MMHG | HEIGHT: 68 IN | TEMPERATURE: 98.3 F | WEIGHT: 190 LBS | BODY MASS INDEX: 28.79 KG/M2 | SYSTOLIC BLOOD PRESSURE: 110 MMHG | OXYGEN SATURATION: 100 % | HEART RATE: 67 BPM

## 2022-08-22 PROCEDURE — 99214 OFFICE O/P EST MOD 30 MIN: CPT

## 2022-08-22 PROCEDURE — 93282 PRGRMG EVAL IMPLANTABLE DFB: CPT

## 2022-08-22 PROCEDURE — 93000 ELECTROCARDIOGRAM COMPLETE: CPT | Mod: 59

## 2022-08-22 PROCEDURE — 93242 EXT ECG>48HR<7D RECORDING: CPT

## 2022-08-22 NOTE — REVIEW OF SYSTEMS
[Fever] : no fever [Chills] : no chills [Palpitations] : palpitations [Syncope] : no syncope [Cough] : no cough [Nausea] : no nausea [Vomiting] : no vomiting [Diarrhea] : diarrhea

## 2022-08-22 NOTE — PROCEDURE
[No] : not [NSR] : normal sinus rhythm [ICD] : Implantable cardioverter-defibrillator [VVI] : VVI [Longevity: ___ months] : The estimated remaining battery life is [unfilled] months [Lead Imp:  ___ohms] : lead impedance was [unfilled] ohms [Sensing Amplitude ___mv] : sensing amplitude was [unfilled] mv [___V @] : [unfilled] V [___ ms] : [unfilled] ms [None] : none [de-identified] : medtronic  [de-identified] : 5/16/2018 [de-identified] : 40 [de-identified] : 7.3 years  [de-identified] :  0.1%\par NO VT/VF since implant\par VVI 40\par VF ZONE STARTS  BPM\par VT monitor zone starts at 150 bpm

## 2022-08-22 NOTE — DISCUSSION/SUMMARY
[FreeTextEntry1] : ICD is working well with no VT or VF events and low pacing burden. He is not on home monitoring and like to start this. Not doing remote monitoring. \par - Follow up in 6 months. \par - Will do one week MCOT prior to next visit.

## 2022-08-22 NOTE — HISTORY OF PRESENT ILLNESS
[de-identified] : 52M Creole-speaking man (family member helped translating) with history of CAD with prior MI, and current angina and multivessel stenting with extensive PCI history, LV apical thrombus (on life long warfarin now), HFrEF (around 40%), syncope with 2:1 heart block s/p DC Medtronic ICD 2018, CVA (2019 due to LV clot) \par \par Patient history is well summarized in Dr. Rush’s note and was fully reviewed. He is referred to establish care with EP for routine ICD follow up and check. \par \par ROS:Daily stable CP. \par \par HPI 2022. Above from initial consult on 2022. At that time, I advised to use home monitoring and in-office device checks less frequently. He did not want to do the home monitoring and so came for in office visit. Since last visit, he reports occasional brief palpitation. No syncope, presyncope, sustained palpitation or ICD shocks. He did not get the MCOT. \par \par Social history:\par Negative for smoking, illicit drugs or alcohol abuse.\par \par Family history:\par Father  from heart attack at age 47. Sister  from a stroke at age 66.  \par \par \par TESTS:\par EKG 2022: SR with inferior and AL Q waves\par EKG 22: SR, PRPW, inferior Q wave\par EK20- Sinus 67, normal axis, inferior infarct, lateral TWI, QTc 4029/15/20- Sinus 64, normal axis, inferior and anterolateral infarct, diffuse T-wave inversion, QTc 463 \par \par Echo: 20- Summary: 1. Technically adequate study. 2. Endocardium opacified with echocontrast (Definity). 3. Left ventricular ejection fraction, by visual estimation, is 40 to 45%. 4. Mildly to moderately decreased segmental left ventricular systolic function. 5. There is akinesis/aneurysmal apex and distal LV segments and akinesis of the basal inferior wall. 6. There is moderate concentric left ventricular hypertrophy. 7. Calcified lesion attached to the LV apical wall suggestive of old calcifed mural thrombus, echocontrast images without evidence of filling defect. 8. There is no evidence of pericardial effusion. 9. Compared to the prior TTE study from 9/3/20, segmental wall abnormalities unchanged and LV systolic function/EF mild-moderately reduced consistent with underlying cardiomyopathy. Calcified apical mural thrombus noted on noncontrast images.20- Summary: 1. Endocardial visualization was enhanced with intravenous echo contrast. 2. Left ventricular ejection fraction, by visual estimation, is 55 to 60%. 3. Normal global left ventricular systolic function. 4. Multiple left ventricular regional wall motion abnormalities exist. See wall motion findings. 5. Normal left ventricular internal cavity size. 6. There is moderate concentric left ventricular hypertrophy. 7. Normal left atrial size. 8. Normal right atrial size. 9. Mild thickening of the anterior and posterior mitral valve leaflets.10. Trace mitral valve regurgitation.11. Sclerotic aortic valve with normal opening.12. There is no evidence of pericardial effusion. LVEF 40-45% upon my own review%. \par \par Cardiac Cath: 20- CORONARY VESSELS: The coronary circulation is right dominant. \par LM: -- LM: Normal.\par LAD: -- Distal LAD: There was a diffuse 95 % stenosis in-stent.\par CX: -- Proximal circumflex: There was a 20 % stenosis.\par RCA: -- RCA: This vessel was not injected.\par COMPLICATIONS: No complications occurred during the cath lab visit.\par DIAGNOSTIC IMPRESSIONS: Severe LAD instent restenosis. PCI performed with 2DES.DIAGNOSTIC RECOMMENDATIONS: Aspirin and plavix. \par INTERVENTIONAL IMPRESSIONS: Severe LAD instent restenosis. PCI performed with 2 PEPE.20- CORONARY VESSELS: The coronary circulation is right dominant. LM: -- LM: Normal.\par LAD: -- Mid LAD: There was a 20 % stenosis. In a second lesion, there was a diffuse 70 % stenosis in-stent.-- Distal LAD: There was a diffuse 70 % stenosis in-stent. In a second lesion, there was a 80 % stenosis.\par CX: -- Mid circumflex: There was a 0 % stenosis in-stent.\par RCA: -- Mid RCA: There was a 20 % stenosis in-stent. Superior take off. \par COMPLICATIONS: No complications occurred during the cath lab visit.DIAGNOSTIC IMPRESSIONS: Patent RCA and circumflex stents. Diffuse mid to distal LAD instent restenosis in a 2.25/2.5 mm stents. DIAGNOSTIC RECOMMENDATIONS: Considering small caliber vessel, diffuse nature of disease, patient needs to be optimized on antianginal therapy. If fails, will consider laser atherectomy/PCI to LAD. Would require 40-50mm of small diameter stents to address diffuse disease. \par

## 2022-08-22 NOTE — HISTORY OF PRESENT ILLNESS
[de-identified] : 52M Creole-speaking man (family member helped translating) with history of CAD with prior MI, and current angina and multivessel stenting with extensive PCI history, LV apical thrombus (on life long warfarin now), HFrEF (around 40%), syncope with 2:1 heart block s/p DC Medtronic ICD 5/2018, CVA (6/2019 due to LV clot) who was referred to Dr. Barfield to establish ICD follow up.\par \par INCOMPLETE NOTE NOT YET SEEN \par

## 2022-08-22 NOTE — ASSESSMENT
[FreeTextEntry1] : 52M Creole-speaking man (family member helped translating) with history of CAD with prior MI, and current angina and multivessel stenting with extensive PCI history, LV apical thrombus (on life long warfarin now), HFrEF (around 40%), syncope with 2:1 heart block s/p DC Medtronic ICD 5/2018, CVA (6/2019 due to LV clot) \par \par Patient history is well summarized in Dr. Rush’s note and was fully reviewed. He is referred to establish care with EP for routine ICD follow up and check. \par \par ROS:Daily stable CP. \par \par HPI 8/22/2022. Above from initial consult on 5/2022. At that time, I advised to use home monitoring and in-office device checks less frequently. He did not want to do the home monitoring and so came for in office visit. Since last visit, he reports occasional brief palpitation. No syncope, presyncope, sustained palpitation or ICD shocks. He did not get the MCOT.

## 2022-09-12 ENCOUNTER — NON-APPOINTMENT (OUTPATIENT)
Age: 52
End: 2022-09-12

## 2022-09-12 PROCEDURE — 93244 EXT ECG>48HR<7D REV&INTERPJ: CPT

## 2022-09-13 LAB
INR PPP: 2.36 RATIO
PT BLD: 28.1 SEC

## 2022-09-20 NOTE — PATIENT PROFILE ADULT - LAST TOBACCO USE (DD-MM-YY)
Consent (Temporal Branch)/Introductory Paragraph: The rationale for Mohs was explained to the patient and consent was obtained. The risks, benefits and alternatives to therapy were discussed in detail. Specifically, the risks of damage to the temporal branch of the facial nerve, infection, scarring, pain, bleeding, prolonged wound healing, incomplete removal, allergy to anesthesia, nerve injury, recurrence and the possible need of delayed or additional reconstruction were addressed.  Prior to the procedure, the treatment site was clearly identified and confirmed by the patient. All components of Universal Protocol/PAUSE Rule completed. 12-May-2016

## 2022-11-11 ENCOUNTER — RX RENEWAL (OUTPATIENT)
Age: 52
End: 2022-11-11

## 2022-11-22 ENCOUNTER — NON-APPOINTMENT (OUTPATIENT)
Age: 52
End: 2022-11-22

## 2022-11-22 LAB
INR PPP: 2.65 RATIO
PT BLD: 31 SEC

## 2022-11-28 ENCOUNTER — APPOINTMENT (OUTPATIENT)
Dept: CARDIOLOGY | Facility: CLINIC | Age: 52
End: 2022-11-28
Payer: SELF-PAY

## 2022-11-28 ENCOUNTER — NON-APPOINTMENT (OUTPATIENT)
Age: 52
End: 2022-11-28

## 2022-11-28 VITALS
OXYGEN SATURATION: 96 % | HEIGHT: 68 IN | HEART RATE: 72 BPM | TEMPERATURE: 99.2 F | WEIGHT: 194.13 LBS | BODY MASS INDEX: 29.42 KG/M2 | SYSTOLIC BLOOD PRESSURE: 118 MMHG | DIASTOLIC BLOOD PRESSURE: 68 MMHG

## 2022-11-28 DIAGNOSIS — I71.02 DISSECTION OF ABDOMINAL AORTA: ICD-10-CM

## 2022-11-28 DIAGNOSIS — I71.4 DISSECTION OF ABDOMINAL AORTA: ICD-10-CM

## 2022-11-28 PROCEDURE — 93000 ELECTROCARDIOGRAM COMPLETE: CPT

## 2022-11-28 PROCEDURE — 99212 OFFICE O/P EST SF 10 MIN: CPT

## 2022-11-28 RX ORDER — ISOSORBIDE MONONITRATE 60 MG/1
60 TABLET, EXTENDED RELEASE ORAL DAILY
Qty: 90 | Refills: 3 | Status: DISCONTINUED | COMMUNITY
Start: 2020-09-15 | End: 2022-11-28

## 2022-11-28 NOTE — PHYSICAL EXAM

## 2022-11-28 NOTE — DISCUSSION/SUMMARY
[EKG obtained to assist in diagnosis and management of assessed problem(s)] : EKG obtained to assist in diagnosis and management of assessed problem(s) [FreeTextEntry1] : 52M Creole-speaking man with history of CAD with multivessel stentings and MI (2017 at [facility] with restenosis of RCA and LAD s/p PEPE to mRCA), LV apical thrombus (previously been on warfarin since 2017 discontinued on 2020), HFrEF (35-40% in 2018), syncope with 2:1 heart block s/p Medtronic AICD 5/2018 (DDD ), CVA (6/2019) and with unstable angina in 11/2019 s/p PCI/stent to mLCx at [facility], previously follows at [facility] with Dr. Crook but since left practice without follow up, presented to [facility] on 9/2/20 with unstable angina, serial Troponin negative, repeaT TTE with EF 40-45% (upon my own review) and resolved LV apical thrombus (known since 11/2019 but unclear reason remains on warfarin and self discontinued clopidogrel 75mg since 7/2019), underwent LHC found with patent stents in LCx, RCA and moderate-severe instent restenosis of [id number] LAD, given small caliber size of the vessel decided medical management with antianginal (increased Imdur to 120mg), presented for initial outpatient cardiology evaluation on 9/15/20, added Ranexa to Imdur for severe angina, still with refractory symptoms and he presented again to [facility] on 9/23/20, underwent PCI/PEPE x2 to dLAD for 95% severe instent restenosis, repeat TTE off warfarin found with apical akinesis/aneurysm with calcified mural thrombus, LV EF 40-45%, had 21 beats WCT, increased carvedilol dose to 25mg BID, discharged home on 9/24/20, prior visit 5/2021 after returned back from [facility] with borderline SBP redcued carvedilol dose to 12.5mg, last visit 11/2021 interval with recurrent CVAs had repeat TTE with Definity confirmed recurrence of LV apical thrombus, restarted back on warfarin, last seen in office 1/2022, interval readmitted to [facility] 2/28/22 for chest pain, CTA done with probable focal dissection at the distal abdominal aorta, repeat LHC shown extensive instent restenosis in mLAD with prior 2 layers of stent, patent RCA and LCx stents, patient referred for brachytherapy at [facility] with Dr. Hannah Cheatham found 90% ISR at mLAD unable to deliver catheter to the distal segment with partial improvement of proximal half of the vessel (80% mLAD), last visit 4/2022 still with recurrent stable anginal symptoms, interval followed with EPS/Dr. Barfield had 1 week Holter done with 6% PVCs, last seen 7/2022, presents for follow up.    \par \par No change in stable chronic anginal symptoms, reiterated that unfortunately no interventional option possible, not candidate for CABG as no distal target, continue antianginals and reassured no need to be overtly concern about anginal chest pain. EKG unchanged old infarct. Still with labile BP after Imdur/nitro use, advised not to skip antihypertensives/GDMT unless SBP <100 or with dizziness. \par \par Prior CVA in setting of recurrent LV thrombus when off anticoagulant, resumed on warfarin with therapeutic monthly INR checks, need level for this month and recheck CBC/CMP level.  Chronic angina with EKG unchanged. Recurrent angina despite prior PCI/PEPE, continue current antianginals increase Ranexa to 1000mg BID as reports dizziness with higher dose of Imdur use, advised to take sublingual nitro PRN, carvedilol 12.5mg BID given reports of dizziness/borderline SBP 90-100s at time. AICD interrogated today no VT/VF event and with 7.3 years battery life. \par \par \par 1. CAD with multiple stents and instent-restenosis of dLAD s/p PEPE x2 9/2020 and failed brachytherapy 4/2022- off ASA and continue clopidogrel with warfarin; continue carvedilol to 12.5mg BID and continue Imdur 30mg if BP allows and Ranexa 1000mg BID, advised to use sublingual nitrate PRN limited by low BP effect, Continue high dose atorvastatin 80mg. \par \par 2. Ischemic cardiomyopathy, HFrEF- ACC/AHA stage B- continue carvedilol 12.5g BID and losartan 25mg due to borderline low BP, continue spironolactone 25mg. \par \par 3. LV apical thrombus with recent CVA- was off warfarin since 2020, repeat TTE with echocontrast with recurrence of LV thrombus due to aneurysmal region, continue warfarin INR goal 2-3 indefinitely with monthly INR checks, recent repeat Echo with resolution of thrombus but high risk of recurrence given aneurysmal segment.  \par \par 4. History of 2:1 heart block, prior NSVT- AICD interrogated during hospitalization; continue carvedilol, EPS followup for ICD monitoring, still with 7yrs battery remaining, no defib or VT. Holter with 6% PVCs burden on carvedilol. \par \par 5. Recent CTA abdomen questionable distal focal dissection?-  abdominal aortic Duplex confirmed findings in 5/2022, currently no abdominal discomfort, did not see vascular surgery advised to follow up when his insurance is active; continue BP control.  \par \par \par \par \par Follow up in 4 months. \par \par

## 2022-11-28 NOTE — HISTORY OF PRESENT ILLNESS
[FreeTextEntry1] : 52M Creole-speaking man with history of CAD with multivessel stentings and MI (2017 at [facility] with restenosis of RCA and LAD s/p PEPE to mRCA), LV apical thrombus (previously been on warfarin since  discontinued on ), HFrEF (35-40% in ), syncope with 2:1 heart block s/p Medtronic AICD 2018 (DDD ), CVA (2019) and with unstable angina in 2019 s/p PCI/stent to mLCx at [facility], previously follows at [facility] with Dr. Corok but since left practice without follow up, presented to [facility] on 20 with unstable angina, serial Troponin negative, repeaT TTE with EF 40-45% (upon my own review) and resolved LV apical thrombus (known since 2019 but unclear reason remains on warfarin and self discontinued clopidogrel 75mg since 2019), underwent LHC found with patent stents in LCx, RCA and moderate-severe instent restenosis of [id number] LAD, given small caliber size of the vessel decided medical management with antianginal (increased Imdur to 120mg), presented for initial outpatient cardiology evaluation on 9/15/20, added Ranexa to Imdur for severe angina, still with refractory symptoms and he presented again to [facility] on 20, underwent PCI/PEPE x2 to dLAD for 95% severe instent restenosis, repeat TTE off warfarin found with apical akinesis/aneurysm with calcified mural thrombus, LV EF 40-45%, had 21 beats WCT, increased carvedilol dose to 25mg BID, discharged home on 20, prior visit 2021 after returned back from [facility] with borderline SBP redcued carvedilol dose to 12.5mg, last visit 2021 interval with recurrent CVAs had repeat TTE with Definity confirmed recurrence of LV apical thrombus, restarted back on warfarin, last seen in office 2022, interval readmitted to [facility] 22 for chest pain, CTA done with probable focal dissection at the distal abdominal aorta, repeat LHC shown extensive instent restenosis in mLAD with prior 2 layers of stent, patent RCA and LCx stents, patient referred for brachytherapy at [facility] with Dr. Hannah Cheatham found 90% ISR at mLAD unable to deliver catheter to the distal segment with partial improvement of proximal half of the vessel (80% mLAD), last visit 2022 still with recurrent stable anginal symptoms, interval followed with EPS/Dr. Barfield had 1 week Holter done with 6% PVCs, last seen 2022, presents for follow up.  \par \par Recent INR 2.6 on , but missed August and October INR checks. \par \par Patient presents with spouse for the visit. No change in his symptoms with chronic angina but still able to ambulate for 45 minutes. Sees some blood after he brush his teeth pending dental eval, denies other bleeding except for noticing some dark colored stool at time. He has completed his COVID vaccines about 3 month ago. Denies AICD shock, has intermittent lower abdominal pain about once monthly. Reports skip his BP meds at time when he sees -110s. \par \par \par Prior visit 2022: \par Monthly INR been therapeutic 2.4\par \par Patient presents with his spouse for the visit. \par Still having recurrent resting chest pain at rest can last for 30 minutes and at times up to 2 days, denies pain with ambulation can walk up to 30 minutes, had tried sublingual nitro no effect except dropping his BP. Denies bleeding on warfarin and clopidogrel use. Reports intermittent cough for 2 months concern if its from lisinopril use. \par \par \par \par Prior visit 2022: \par Patient again presents with his partner for the visit. \par Still having daily anginal chest pain at rest lasting for 15-20 minutes, has not used sublingual nitroglycerin but can lead to low SBP 90s and dizziness. Been adherent with all of his other medications. No ICD shock or syncope, has intermittent palpitations. \par \par Continues to decline COVID vacccine due to not feeling with dizziness, bodyaches\par \par \par Prior visit 2022: \par Still has chronic angina, takes sublingual only once. Has labile BP with high readings SBP 170s but then would be low after nitroglycerin use. Denies bleeding on warfarin/clopidogrel use. \par \par Still not yet obtain COVID vaccine. \par \par Prior visit 2021: \par He's been living in [place] with his significant other, had been hospitalized twice there, 1st episode episode in 2021 for chest pain had Echo and nuclear stress test done showed no ischemia, 2nd admission 10/2021 for acute stroke symptoms (facial droop and L-sided weakness for few hours), symptoms completely resolved once in the ER, noted to be hypertensive 184/104, did not undergo MRI brain cause he was claustrophobic but repeat CT head done no new infarct, LDL 89 increased atorvastatin to 80mg. \par \par Been feeling well since returning back to [place], still has chronic angina at rest, able to walk for 30 minutes, still with nonspecific paresthesia sensation of hotness in his body and feeling dizzy after his medications use. \par \par Remains hesitant about COVID vaccine. \par \par Prior visit 2021\par Patient presents with is sister-in-law requesting her to interpret. \par \par He left for [facility] since 10/2020 and just returned this week, still with intermittent chronic angina, had one episode yesterday lasted for over 1 hour, does not take sublingual nitro as reports cause his BP to be high, but when take his meds would see SBP 90-100s and would take Imdur 30 BID instead, self paying for all his medications. Having intermittent palpitations lasting few seconds and lasted for few hours, denies ICD shock. Currently with his travel visa expiring in July. \par \ester Has not yet schedule for the COVID vaccine due to concern from his wife. \par \par \par Prior visit 2020:\par He reports still having chest pain daily and sometime can last throughout the day, has not tried sublingual nitro. Sometimes feel as fire sensation, doesn't take any PPI/antacids. He's planning to return to [facility] for 4-5 months to resume PT for his R-leg from prior stroke. \par \par Prior visit 9/15/20:\par Patient reports SBP 80-90s with dizziness at times when he uses Imdur 120mg, continues to have L-sided chest pain but mainly at night and in the morning, at times also with ambulation, typically last for about 30 minutes. He remains compliant with ASA/clopidogrel, off warfarin. He is planning ot return to [facility] in about 1-2 month as his visa is about to be . \par \par \par Lipid panel:\par TG 58, , HDL 39, LDL 54

## 2022-11-28 NOTE — CARDIOLOGY SUMMARY
[___] : [unfilled] [LVEF ___%] : LVEF [unfilled]% [de-identified] : 11/15/21- sinus 75, normal axis, inferior infarct, precordial ST, lateral T-wave inversion, QTc 396\par 1/21/22- sinus 75, normal axis, inferolateral infarct, precordial ST, lateral T-wave inversion, QTc 386\par 4/26/22- sinus 69, normal axis, inferolateral infarct, precordial ST, lateral TWI, QTc 407\par 7/26/22- sinus 61, normal axis, inferolateral infarct, precordial ST elevation, lateral TWI, QTc 409\par 11/28/22- sinus 71, normal axis, inferolateral infarct, lateral T-wave inverions, QTc 409 [de-identified] : 11/22/21- LV EF 40-45%, apical aneurysmal with slow flow, 0.6 x 0.7 cm thrombus [de-identified] : 2/26/22- \par Summary:\par  1. Normal left ventricular internal cavity size.\par  2. There is moderate concentric left ventricular hypertrophy.\par  3. Entire apex, basal inferoseptal segment, and basal inferior segment \par are abnormal as described above.\par  4. Ischemic cardiomyopathy.\par  5. Mildly decreased global left ventricular systolic function.\par  6. Left ventricular ejection fraction, by visual estimation, is 40 to \par 45%.\par  7. Normal right ventricular size and function.\par  8. Mild thickening of the anterior and posterior mitral valve leaflets.\par  9. Trace mitral valve regurgitation.\par 10. There is no evidence of pericardial effusion.\par 11. Endocardial visualization was enhanced with intravenous echo contrast. [de-identified] : 2/28/22- VENTRICLES: No LV gram was performed; however, a recent echocardiogram\par demonstrated an EF of 45 %.\par CORONARY VESSELS: The coronary circulation is right dominant.\par LM:   --  LM: Normal.\par LAD:   --  Mid LAD: There was a diffuse 90 % stenosis at the site of a\par prior stent, in-stent. There was LOLIS grade 3 flow through the vessel\par (brisk flow).\par --  Distal LAD: There was a diffuse 80 % stenosis in-stent.\par --  D1: Normal.\par CX:   --  Circumflex: The vessel was medium to large sized.\par --  Mid circumflex: There was a diffuse 10 % stenosis at the site of a\par prior stent.\par RCA:   --  RCA: The vessel arose abnormally high from the right sinus of\par Valsalva.\par --  Mid RCA: There was a diffuse 20 % stenosis at the site of a prior\par stent.\par COMPLICATIONS: No complications occurred during the cath lab visit.\par DIAGNOSTIC IMPRESSIONS: Extensive instent restenosis in mLAD in atleast 2\par layers of stents.\par Prior stents in RCA and LCX patent\par Aortic valve not crossed and ventriculography not performed due to the\par presence of an LV thrombus (11/21)\par DIAGNOSTIC RECOMMENDATIONS: Medical management of CAD and angina with\par optimal medical therapy\par Consider Brachytherapy as a possible treatment modality\par INTERVENTIONAL IMPRESSIONS: Extensive instent restenosis in mLAD in atleast\par 2 layers of stents.\par Prior stents in RCA and LCX patent\par Aortic valve not crossed and ventriculography not performed due to the\par presence of an LV thrombus (11/21)\par INTERVENTIONAL RECOMMENDATIONS: Medical management of CAD and angina with\par optimal medical therapy\par Consider Brachytherapy as a possible treatment modality\par \par 4/13/22- \par \par Following intervention there is a 80 % residual stenosis. There was\par LOLIS Flow 3 before the procedure and LOLIS Flow 3 following\par the procedure. Following intervention there is a partially improved\par angiographic appearance.

## 2022-12-21 ENCOUNTER — LABORATORY RESULT (OUTPATIENT)
Age: 52
End: 2022-12-21

## 2022-12-27 ENCOUNTER — RX RENEWAL (OUTPATIENT)
Age: 52
End: 2022-12-27

## 2023-01-20 ENCOUNTER — RX RENEWAL (OUTPATIENT)
Age: 53
End: 2023-01-20

## 2023-01-30 ENCOUNTER — NON-APPOINTMENT (OUTPATIENT)
Age: 53
End: 2023-01-30

## 2023-01-30 LAB
INR PPP: 2.41 RATIO
PT BLD: 28.7 SEC

## 2023-02-17 ENCOUNTER — RX RENEWAL (OUTPATIENT)
Age: 53
End: 2023-02-17

## 2023-02-21 ENCOUNTER — LABORATORY RESULT (OUTPATIENT)
Age: 53
End: 2023-02-21

## 2023-02-23 ENCOUNTER — NON-APPOINTMENT (OUTPATIENT)
Age: 53
End: 2023-02-23

## 2023-02-27 ENCOUNTER — APPOINTMENT (OUTPATIENT)
Dept: ELECTROPHYSIOLOGY | Facility: CLINIC | Age: 53
End: 2023-02-27
Payer: SELF-PAY

## 2023-02-27 VITALS
BODY MASS INDEX: 29.25 KG/M2 | RESPIRATION RATE: 16 BRPM | SYSTOLIC BLOOD PRESSURE: 140 MMHG | TEMPERATURE: 98.2 F | HEIGHT: 68 IN | WEIGHT: 193 LBS | DIASTOLIC BLOOD PRESSURE: 74 MMHG | OXYGEN SATURATION: 97 % | HEART RATE: 71 BPM

## 2023-02-27 PROCEDURE — 93000 ELECTROCARDIOGRAM COMPLETE: CPT | Mod: 59

## 2023-02-27 PROCEDURE — 99215 OFFICE O/P EST HI 40 MIN: CPT

## 2023-02-27 PROCEDURE — 93282 PRGRMG EVAL IMPLANTABLE DFB: CPT

## 2023-02-27 NOTE — ASSESSMENT
[FreeTextEntry1] : 52M Creole-speaking man (family member helped translating) with history of CAD with prior MI, and current angina and multivessel stenting with extensive PCI history, LV apical thrombus (on life long warfarin now), HFrEF (around 40%), syncope with 2:1 heart block s/p DC Medtronic ICD 5/2018, CVA (6/2019 due to LV clot) \par \par Patient history is well summarized in Dr. Rush’s note and was fully reviewed. He is referred to establish care with EP for routine ICD follow up and check. \par \par ROS:Daily stable CP. \par \par HPI 8/22/2022. Above from initial consult on 5/2022. At that time, I advised to use home monitoring and in-office device checks less frequently. He did not want to do the home monitoring and so came for in office visit. Since last visit, he reports occasional brief palpitation. No syncope, presyncope, sustained palpitation or ICD shocks. He did not get the MCOT. \par \par HPI 2/27/23:\par Since last visit he had MCOT which showed around 6% PVC burden. He is here for routine ICD check and EP follow up for his PVCs. He reports compliance with all medications and occasional brief (less than one minute) palpitation with no other associated symptoms. Denies syncope, presyncope or ICD shocks. Has been following with Dr. Rush. In last visit, he reports he wanted to enroll in remote monitoring but this did not happen.

## 2023-02-27 NOTE — REVIEW OF SYSTEMS
[Palpitations] : palpitations [Fever] : no fever [Chills] : no chills [Orthopnea] : no orthopnea [Syncope] : no syncope [Cough] : no cough [Nausea] : no nausea [Vomiting] : no vomiting [Diarrhea] : diarrhea

## 2023-02-27 NOTE — PROCEDURE
[No] : not [NSR] : normal sinus rhythm [ICD] : Implantable cardioverter-defibrillator [VVI] : VVI [Longevity: ___ months] : The estimated remaining battery life is [unfilled] months [Lead Imp:  ___ohms] : lead impedance was [unfilled] ohms [Sensing Amplitude ___mv] : sensing amplitude was [unfilled] mv [___V @] : [unfilled] V [___ ms] : [unfilled] ms [None] : none [de-identified] : medtronic  [de-identified] : 5/16/2018 [de-identified] : 40 [de-identified] : 6.7 years  [de-identified] :  0.2%\par NO VT/VF since implant\par VVI 40\par VF ZONE STARTS  BPM\par VT monitor zone starts at 150 bpm

## 2023-02-27 NOTE — DISCUSSION/SUMMARY
[FreeTextEntry1] : ICD is working well with no VT or VF events and low pacing burden. He is not on home monitoring and like to start this. Not doing remote monitoring yet. \par - Follow up in 9 months.\par - To start remote monitoring\par \par Frequent mildly symptomatic PVCs on BB. We discussed this as well as indication and options of management. Given low symptoms burden and PVC burden is less than 10% will monitor for now.  \par - Will do one week MCOT prior to next visit. \par - Will consider PVC ablation vs AAT if burden above 10% given LV dysfunction, or sustained VA or worsening symptoms.

## 2023-02-27 NOTE — HISTORY OF PRESENT ILLNESS
[de-identified] : 52M Creole-speaking man (family member helped translating) with history of CAD with prior MI, and current angina and multivessel stenting with extensive PCI history, LV apical thrombus (on life long warfarin now), HFrEF (around 40%), syncope with 2:1 heart block s/p DC Medtronic ICD 2018, CVA (2019 due to LV clot) \par \par Patient history is well summarized in Dr. Rush’s note and was fully reviewed. He is referred to establish care with EP for routine ICD follow up and check. \par \par ROS:Daily stable CP. \par \par HPI 2022. Above from initial consult on 2022. At that time, I advised to use home monitoring and in-office device checks less frequently. He did not want to do the home monitoring and so came for in office visit. Since last visit, he reports occasional brief palpitation. No syncope, presyncope, sustained palpitation or ICD shocks. He did not get the MCOT. \par \par HPI 23:\par Since last visit he had MCOT which showed around 6% PVC burden. He is here for routine ICD check and EP follow up for his PVCs. He reports compliance with all medications and occasional brief (less than one minute) palpitation with no other associated symptoms. Denies syncope, presyncope or ICD shocks. Has been following with Dr. Rush. In last visit, he reports he wanted to enroll in remote monitoring but this did not happen. \par \par Social history:\par Negative for smoking, illicit drugs or alcohol abuse.\par \par Family history:\par Father  from heart attack at age 47. Sister  from a stroke at age 66.  \par \par \par TESTS:\par EKG 23: SR with infero-lateral Q waves and PRWP. \par EKG 2022: SR with inferior and AL Q waves\par EKG 22: SR, PRPW, inferior Q wave\par EK20- Sinus 67, normal axis, inferior infarct, lateral TWI, QTc 4029/15/20- Sinus 64, normal axis, inferior and anterolateral infarct, diffuse T-wave inversion, QTc 463 \par \par One week MCOT 2022: SR with noisy baseline. Frequent (5.88%) PVCs. \par \par Echo: 20- Summary: 1. Technically adequate study. 2. Endocardium opacified with echocontrast (Definity). 3. Left ventricular ejection fraction, by visual estimation, is 40 to 45%. 4. Mildly to moderately decreased segmental left ventricular systolic function. 5. There is akinesis/aneurysmal apex and distal LV segments and akinesis of the basal inferior wall. 6. There is moderate concentric left ventricular hypertrophy. 7. Calcified lesion attached to the LV apical wall suggestive of old calcifed mural thrombus, echocontrast images without evidence of filling defect. 8. There is no evidence of pericardial effusion. 9. Compared to the prior TTE study from 9/3/20, segmental wall abnormalities unchanged and LV systolic function/EF mild-moderately reduced consistent with underlying cardiomyopathy. Calcified apical mural thrombus noted on noncontrast images.20- Summary: 1. Endocardial visualization was enhanced with intravenous echo contrast. 2. Left ventricular ejection fraction, by visual estimation, is 55 to 60%. 3. Normal global left ventricular systolic function. 4. Multiple left ventricular regional wall motion abnormalities exist. See wall motion findings. 5. Normal left ventricular internal cavity size. 6. There is moderate concentric left ventricular hypertrophy. 7. Normal left atrial size. 8. Normal right atrial size. 9. Mild thickening of the anterior and posterior mitral valve leaflets.10. Trace mitral valve regurgitation.11. Sclerotic aortic valve with normal opening.12. There is no evidence of pericardial effusion. LVEF 40-45% upon my own review%. \par \par Cardiac Cath: 20- CORONARY VESSELS: The coronary circulation is right dominant. \par LM: -- LM: Normal.\par LAD: -- Distal LAD: There was a diffuse 95 % stenosis in-stent.\par CX: -- Proximal circumflex: There was a 20 % stenosis.\par RCA: -- RCA: This vessel was not injected.\par COMPLICATIONS: No complications occurred during the cath lab visit.\par DIAGNOSTIC IMPRESSIONS: Severe LAD instent restenosis. PCI performed with 2DES.DIAGNOSTIC RECOMMENDATIONS: Aspirin and plavix. \par INTERVENTIONAL IMPRESSIONS: Severe LAD instent restenosis. PCI performed with 2 PEPE.20- CORONARY VESSELS: The coronary circulation is right dominant. LM: -- LM: Normal.\par LAD: -- Mid LAD: There was a 20 % stenosis. In a second lesion, there was a diffuse 70 % stenosis in-stent.-- Distal LAD: There was a diffuse 70 % stenosis in-stent. In a second lesion, there was a 80 % stenosis.\par CX: -- Mid circumflex: There was a 0 % stenosis in-stent.\par RCA: -- Mid RCA: There was a 20 % stenosis in-stent. Superior take off. \par COMPLICATIONS: No complications occurred during the cath lab visit.DIAGNOSTIC IMPRESSIONS: Patent RCA and circumflex stents. Diffuse mid to distal LAD instent restenosis in a 2.25/2.5 mm stents. DIAGNOSTIC RECOMMENDATIONS: Considering small caliber vessel, diffuse nature of disease, patient needs to be optimized on antianginal therapy. If fails, will consider laser atherectomy/PCI to LAD. Would require 40-50mm of small diameter stents to address diffuse disease. \par

## 2023-03-23 ENCOUNTER — LABORATORY RESULT (OUTPATIENT)
Age: 53
End: 2023-03-23

## 2023-03-24 ENCOUNTER — NON-APPOINTMENT (OUTPATIENT)
Age: 53
End: 2023-03-24

## 2023-03-24 ENCOUNTER — RX RENEWAL (OUTPATIENT)
Age: 53
End: 2023-03-24

## 2023-04-04 ENCOUNTER — NON-APPOINTMENT (OUTPATIENT)
Age: 53
End: 2023-04-04

## 2023-04-04 ENCOUNTER — APPOINTMENT (OUTPATIENT)
Dept: CARDIOLOGY | Facility: CLINIC | Age: 53
End: 2023-04-04
Payer: SELF-PAY

## 2023-04-04 VITALS
TEMPERATURE: 99.7 F | BODY MASS INDEX: 29.1 KG/M2 | OXYGEN SATURATION: 99 % | SYSTOLIC BLOOD PRESSURE: 122 MMHG | HEIGHT: 68 IN | WEIGHT: 192 LBS | HEART RATE: 69 BPM | DIASTOLIC BLOOD PRESSURE: 68 MMHG

## 2023-04-04 DIAGNOSIS — I20.8 OTHER FORMS OF ANGINA PECTORIS: ICD-10-CM

## 2023-04-04 PROCEDURE — 93000 ELECTROCARDIOGRAM COMPLETE: CPT

## 2023-04-04 PROCEDURE — 99212 OFFICE O/P EST SF 10 MIN: CPT

## 2023-04-04 RX ORDER — NITROGLYCERIN 0.4 MG/1
0.4 TABLET SUBLINGUAL
Qty: 1 | Refills: 1 | Status: ACTIVE | COMMUNITY
Start: 2020-09-15 | End: 1900-01-01

## 2023-04-04 NOTE — DISCUSSION/SUMMARY
[FreeTextEntry1] : 52M Creole-speaking man with history of CAD with multivessel stentings and MI (2017 at [facility] with restenosis of RCA and LAD s/p PEPE to mRCA), LV apical thrombus (previously been on warfarin since 2017 discontinued on 2020), HFrEF (35-40% in 2018), syncope with 2:1 heart block s/p Medtronic AICD 5/2018 (DDD ), CVA (6/2019) and with unstable angina in 11/2019 s/p PCI/stent to mLCx at [facility], previously follows at [facility] with Dr. Crook but since left practice without follow up, presented to [facility] on 9/2/20 with unstable angina, serial Troponin negative, repeaT TTE with EF 40-45% (upon my own review) and resolved LV apical thrombus (known since 11/2019 but unclear reason remains on warfarin and self discontinued clopidogrel 75mg since 7/2019), underwent LHC found with patent stents in LCx, RCA and moderate-severe instent restenosis of [id number] LAD, given small caliber size of the vessel decided medical management with antianginal (increased Imdur to 120mg), presented for initial outpatient cardiology evaluation on 9/15/20, added Ranexa to Imdur for severe angina, still with refractory symptoms and he presented again to [facility] on 9/23/20, underwent PCI/PEPE x2 to dLAD for 95% severe instent restenosis, repeat TTE off warfarin found with apical akinesis/aneurysm with calcified mural thrombus, LV EF 40-45%, had 21 beats WCT, increased carvedilol dose to 25mg BID, discharged home on 9/24/20, prior visit 5/2021 after returned back from [facility] with borderline SBP redcued carvedilol dose to 12.5mg, last visit 11/2021 interval with recurrent CVAs had repeat TTE with Definity confirmed recurrence of LV apical thrombus, restarted back on warfarin, last seen in office 1/2022, interval readmitted to [facility] 2/28/22 for chest pain, CTA done with probable focal dissection at the distal abdominal aorta, repeat LHC shown extensive instent restenosis in mLAD with prior 2 layers of stent, patent RCA and LCx stents, patient referred for brachytherapy at [facility] with Dr. Hannah Cheatham found 90% ISR at mLAD unable to deliver catheter to the distal segment with partial improvement of proximal half of the vessel (80% mLAD), last visit 4/2022 still with recurrent stable anginal symptoms, interval followed with EPS/Dr. Barfield had 1 week Holter done with 6% PVCs, last seen 11/2022, interval followed with EP pending repeat MCOT on future follow up if PVC burden >10% the required intervention, presents for follow up.    \par \par No change in stable chronic anginal symptoms, reiterated that unfortunately no interventional option possible, not candidate for CABG as no distal target, continue antianginals and reassured no need to be overtly concern about anginal chest pain. EKG unchanged old infarct with chronic aneurysmal changes. Still with labile BP after Imdur/nitro use, advised not to skip antihypertensives/GDMT unless SBP <100 or with dizziness. \par \par Prior CVA in setting of recurrent LV thrombus when off anticoagulant, resumed on warfarin with therapeutic monthly INR checks, need level for this month and recheck CBC/CMP level.  Chronic angina with EKG unchanged. Recurrent angina despite prior PCI/PEPE, continue current antianginals increase Ranexa to 1000mg BID as reports dizziness with higher dose of Imdur use, advised to take sublingual nitro PRN, carvedilol 12.5mg BID given reports of dizziness/borderline SBP 90-100s at time. AICD interrogated today no VT/VF event and with 7.3 years battery life. \par \par \par 1. CAD with multiple stents and instent-restenosis of dLAD s/p PEPE x2 9/2020 and failed brachytherapy 4/2022- off ASA and continue clopidogrel with warfarin; continue carvedilol to 12.5mg BID and continue Imdur 30mg if BP allows and Ranexa 1000mg BID, advised to use sublingual nitrate PRN limited by low BP effect, Continue high dose atorvastatin 80mg. \par \par 2. Ischemic cardiomyopathy, HFrEF- ACC/AHA stage B- continue carvedilol 12.5g BID and losartan 25mg due to borderline low BP, continue spironolactone 25mg. ICD remote monitoring and follow up with EP. \par \par 3. LV apical thrombus with prior CVA- was off warfarin since 2020, repeat TTE with echocontrast with recurrence of LV thrombus due to aneurysmal region, continue warfarin INR goal 2-3 indefinitely with monthly INR checks, prior repeat Echo with resolution of thrombus but high risk of recurrence given aneurysmal segment.  \par \par 4. History of 2:1 heart block, prior NSVT- AICD interrogated during hospitalization; continue carvedilol, EPS followup for ICD monitoring, still with 7yrs battery remaining, no defib or VT. Holter with 6% PVCs burden on carvedilol. \par \par 5.Prior CTA abdomen questionable distal focal dissection?-  abdominal aortic Duplex confirmed findings in 5/2022, currently no abdominal discomfort, did not see vascular surgery advised to follow up when his insurance is active; continue strict BP control.  \par \par \par \par \par Follow up in 6 months. \par \par  [EKG obtained to assist in diagnosis and management of assessed problem(s)] : EKG obtained to assist in diagnosis and management of assessed problem(s)

## 2023-04-04 NOTE — CARDIOLOGY SUMMARY
[___] : [unfilled] [LVEF ___%] : LVEF [unfilled]% [de-identified] : 11/15/21- sinus 75, normal axis, inferior infarct, precordial ST, lateral T-wave inversion, QTc 396\par 1/21/22- sinus 75, normal axis, inferolateral infarct, precordial ST, lateral T-wave inversion, QTc 386\par 4/26/22- sinus 69, normal axis, inferolateral infarct, precordial ST, lateral TWI, QTc 407\par 7/26/22- sinus 61, normal axis, inferolateral infarct, precordial ST elevation, lateral TWI, QTc 409\par 4/4/23- sinus 67, normal axis, inferolateral infarct, precordial ST elevation, QTc 394\par 11/28/22- sinus 71, normal axis, inferolateral infarct, lateral T-wave inverions, QTc 409 [de-identified] : 11/22/21- LV EF 40-45%, apical aneurysmal with slow flow, 0.6 x 0.7 cm thrombus [de-identified] : 2/26/22- \par Summary:\par  1. Normal left ventricular internal cavity size.\par  2. There is moderate concentric left ventricular hypertrophy.\par  3. Entire apex, basal inferoseptal segment, and basal inferior segment \par are abnormal as described above.\par  4. Ischemic cardiomyopathy.\par  5. Mildly decreased global left ventricular systolic function.\par  6. Left ventricular ejection fraction, by visual estimation, is 40 to \par 45%.\par  7. Normal right ventricular size and function.\par  8. Mild thickening of the anterior and posterior mitral valve leaflets.\par  9. Trace mitral valve regurgitation.\par 10. There is no evidence of pericardial effusion.\par 11. Endocardial visualization was enhanced with intravenous echo contrast. [de-identified] : 2/28/22- VENTRICLES: No LV gram was performed; however, a recent echocardiogram\par demonstrated an EF of 45 %.\par CORONARY VESSELS: The coronary circulation is right dominant.\par LM:   --  LM: Normal.\par LAD:   --  Mid LAD: There was a diffuse 90 % stenosis at the site of a\par prior stent, in-stent. There was LOLIS grade 3 flow through the vessel\par (brisk flow).\par --  Distal LAD: There was a diffuse 80 % stenosis in-stent.\par --  D1: Normal.\par CX:   --  Circumflex: The vessel was medium to large sized.\par --  Mid circumflex: There was a diffuse 10 % stenosis at the site of a\par prior stent.\par RCA:   --  RCA: The vessel arose abnormally high from the right sinus of\par Valsalva.\par --  Mid RCA: There was a diffuse 20 % stenosis at the site of a prior\par stent.\par COMPLICATIONS: No complications occurred during the cath lab visit.\par DIAGNOSTIC IMPRESSIONS: Extensive instent restenosis in mLAD in atleast 2\par layers of stents.\par Prior stents in RCA and LCX patent\par Aortic valve not crossed and ventriculography not performed due to the\par presence of an LV thrombus (11/21)\par DIAGNOSTIC RECOMMENDATIONS: Medical management of CAD and angina with\par optimal medical therapy\par Consider Brachytherapy as a possible treatment modality\par INTERVENTIONAL IMPRESSIONS: Extensive instent restenosis in mLAD in atleast\par 2 layers of stents.\par Prior stents in RCA and LCX patent\par Aortic valve not crossed and ventriculography not performed due to the\par presence of an LV thrombus (11/21)\par INTERVENTIONAL RECOMMENDATIONS: Medical management of CAD and angina with\par optimal medical therapy\par Consider Brachytherapy as a possible treatment modality\par \par 4/13/22- \par \par Following intervention there is a 80 % residual stenosis. There was\par LOLIS Flow 3 before the procedure and LOLIS Flow 3 following\par the procedure. Following intervention there is a partially improved\par angiographic appearance.

## 2023-04-04 NOTE — HISTORY OF PRESENT ILLNESS
[FreeTextEntry1] : 52M Creole-speaking man with history of CAD with multivessel stentings and MI (2017 at [facility] with restenosis of RCA and LAD s/p PEPE to mRCA), LV apical thrombus (previously been on warfarin since  discontinued on ), HFrEF (35-40% in ), syncope with 2:1 heart block s/p Medtronic AICD 2018 (DDD ), CVA (2019) and with unstable angina in 2019 s/p PCI/stent to mLCx at [facility], previously follows at [facility] with Dr. Crook but since left practice without follow up, presented to [facility] on 20 with unstable angina, serial Troponin negative, repeaT TTE with EF 40-45% (upon my own review) and resolved LV apical thrombus (known since 2019 but unclear reason remains on warfarin and self discontinued clopidogrel 75mg since 2019), underwent LHC found with patent stents in LCx, RCA and moderate-severe instent restenosis of [id number] LAD, given small caliber size of the vessel decided medical management with antianginal (increased Imdur to 120mg), presented for initial outpatient cardiology evaluation on 9/15/20, added Ranexa to Imdur for severe angina, still with refractory symptoms and he presented again to [facility] on 20, underwent PCI/PEPE x2 to dLAD for 95% severe instent restenosis, repeat TTE off warfarin found with apical akinesis/aneurysm with calcified mural thrombus, LV EF 40-45%, had 21 beats WCT, increased carvedilol dose to 25mg BID, discharged home on 20, prior visit 2021 after returned back from [facility] with borderline SBP redcued carvedilol dose to 12.5mg, last visit 2021 interval with recurrent CVAs had repeat TTE with Definity confirmed recurrence of LV apical thrombus, restarted back on warfarin, last seen in office 2022, interval readmitted to [facility] for chest pain, CTA done with probable focal dissection at the distal abdominal aorta, repeat LHC shown extensive instent restenosis in mLAD with prior 2 layers of stent, patent RCA and LCx stents, patient referred for brachytherapy at [facility] with Dr. Hannah Cheatham found 90% ISR at mLAD unable to deliver catheter to the distal segment with partial improvement of proximal half of the vessel (80% mLAD), last visit 2022 still with recurrent stable anginal symptoms, interval followed with EPS/Dr. Barfield had 1 week Holter done with 6% PVCs, last seen 2022, interval followed with EP pending repeat MCOT on future follow up if PVC burden >10% the required intervention, presents for follow up.  \par \par Recent INR 2.4 been therapeutic at goal >4 months. \par \par Reports still with labile BP with stable intermittent chest pain, active with walking and exercise without chest pain, denies bleeding. No ICD shock and recently had remote monitoring set up at home. Reports constipation and cramps in his calves at times. \par \par \par Prior visit 2022: \par Recent INR 2.6 on , but missed August and October INR checks. \par \par Patient presents with spouse for the visit. No change in his symptoms with chronic angina but still able to ambulate for 45 minutes. Sees some blood after he brush his teeth pending dental eval, denies other bleeding except for noticing some dark colored stool at time. He has completed his COVID vaccines about 3 month ago. Denies AICD shock, has intermittent lower abdominal pain about once monthly. Reports skip his BP meds at time when he sees -110s. \par \par \par Prior visit 2022: \par Monthly INR been therapeutic 2.4\par \par Patient presents with his spouse for the visit. \par Still having recurrent resting chest pain at rest can last for 30 minutes and at times up to 2 days, denies pain with ambulation can walk up to 30 minutes, had tried sublingual nitro no effect except dropping his BP. Denies bleeding on warfarin and clopidogrel use. Reports intermittent cough for 2 months concern if its from lisinopril use. \par \par \par \par Prior visit 2022: \par Patient again presents with his partner for the visit. \par Still having daily anginal chest pain at rest lasting for 15-20 minutes, has not used sublingual nitroglycerin but can lead to low SBP 90s and dizziness. Been adherent with all of his other medications. No ICD shock or syncope, has intermittent palpitations. \par \par Continues to decline COVID vacccine due to not feeling with dizziness, bodyaches\par \par \par Prior visit 2022: \par Still has chronic angina, takes sublingual only once. Has labile BP with high readings SBP 170s but then would be low after nitroglycerin use. Denies bleeding on warfarin/clopidogrel use. \par \par Still not yet obtain COVID vaccine. \par \par Prior visit 2021: \par He's been living in [place] with his significant other, had been hospitalized twice there, 1st episode episode in 2021 for chest pain had Echo and nuclear stress test done showed no ischemia, 2nd admission 10/2021 for acute stroke symptoms (facial droop and L-sided weakness for few hours), symptoms completely resolved once in the ER, noted to be hypertensive 184/104, did not undergo MRI brain cause he was claustrophobic but repeat CT head done no new infarct, LDL 89 increased atorvastatin to 80mg. \par \par Been feeling well since returning back to [place], still has chronic angina at rest, able to walk for 30 minutes, still with nonspecific paresthesia sensation of hotness in his body and feeling dizzy after his medications use. \par \par Remains hesitant about COVID vaccine. \par \par Prior visit 2021\par Patient presents with is sister-in-law requesting her to interpret. \par \par He left for [facility] since 10/2020 and just returned this week, still with intermittent chronic angina, had one episode yesterday lasted for over 1 hour, does not take sublingual nitro as reports cause his BP to be high, but when take his meds would see SBP 90-100s and would take Imdur 30 BID instead, self paying for all his medications. Having intermittent palpitations lasting few seconds and lasted for few hours, denies ICD shock. Currently with his travel visa expiring in July. \par \par Has not yet schedule for the COVID vaccine due to concern from his wife. \par \par \par Prior visit 2020:\par He reports still having chest pain daily and sometime can last throughout the day, has not tried sublingual nitro. Sometimes feel as fire sensation, doesn't take any PPI/antacids. He's planning to return to [facility] for 4-5 months to resume PT for his R-leg from prior stroke. \par \par Prior visit 9/15/20:\par Patient reports SBP 80-90s with dizziness at times when he uses Imdur 120mg, continues to have L-sided chest pain but mainly at night and in the morning, at times also with ambulation, typically last for about 30 minutes. He remains compliant with ASA/clopidogrel, off warfarin. He is planning ot return to [facility] in about 1-2 month as his visa is about to be . \par \par \par Lipid panel:\par TG 58, , HDL 39, LDL 54

## 2023-04-24 ENCOUNTER — LABORATORY RESULT (OUTPATIENT)
Age: 53
End: 2023-04-24

## 2023-04-25 ENCOUNTER — NON-APPOINTMENT (OUTPATIENT)
Age: 53
End: 2023-04-25

## 2023-05-23 ENCOUNTER — LABORATORY RESULT (OUTPATIENT)
Age: 53
End: 2023-05-23

## 2023-05-25 ENCOUNTER — NON-APPOINTMENT (OUTPATIENT)
Age: 53
End: 2023-05-25

## 2023-05-30 ENCOUNTER — APPOINTMENT (OUTPATIENT)
Dept: CARDIOLOGY | Facility: CLINIC | Age: 53
End: 2023-05-30
Payer: SELF-PAY

## 2023-05-30 ENCOUNTER — NON-APPOINTMENT (OUTPATIENT)
Age: 53
End: 2023-05-30

## 2023-05-30 VITALS
HEIGHT: 68 IN | SYSTOLIC BLOOD PRESSURE: 102 MMHG | WEIGHT: 192 LBS | BODY MASS INDEX: 29.1 KG/M2 | DIASTOLIC BLOOD PRESSURE: 80 MMHG | OXYGEN SATURATION: 96 % | HEART RATE: 73 BPM

## 2023-05-30 DIAGNOSIS — R42 DIZZINESS AND GIDDINESS: ICD-10-CM

## 2023-05-30 DIAGNOSIS — T82.855A STENOSIS OF CORONARY ARTERY STENT, INITIAL ENCOUNTER: ICD-10-CM

## 2023-05-30 PROCEDURE — 99213 OFFICE O/P EST LOW 20 MIN: CPT

## 2023-05-30 PROCEDURE — 93000 ELECTROCARDIOGRAM COMPLETE: CPT | Mod: NC

## 2023-05-30 NOTE — DISCUSSION/SUMMARY
[FreeTextEntry1] : 52M Creole-speaking man with history of CAD with multivessel stentings and MI (2017 at [facility] with restenosis of RCA and LAD s/p PEPE to mRCA), LV apical thrombus (previously been on warfarin since 2017 discontinued on 2020), HFrEF (35-40% in 2018), syncope with 2:1 heart block s/p Medtronic AICD 5/2018 (DDD ), CVA (6/2019) and with unstable angina in 11/2019 s/p PCI/stent to mLCx at [facility], previously follows at [facility] with Dr. Crook but since left practice without follow up, presented to [facility] on 9/2/20 with unstable angina, serial Troponin negative, repeaT TTE with EF 40-45% (upon my own review) and resolved LV apical thrombus (known since 11/2019 but unclear reason remains on warfarin and self discontinued clopidogrel 75mg since 7/2019), underwent LHC found with patent stents in LCx, RCA and moderate-severe instent restenosis of [id number] LAD, given small caliber size of the vessel decided medical management with antianginal (increased Imdur to 120mg), presented for initial outpatient cardiology evaluation on 9/15/20, added Ranexa to Imdur for severe angina, still with refractory symptoms and he presented again to [facility] on 9/23/20, underwent PCI/PEPE x2 to dLAD for 95% severe instent restenosis, repeat TTE off warfarin found with apical akinesis/aneurysm with calcified mural thrombus, LV EF 40-45%, had 21 beats WCT, increased carvedilol dose to 25mg BID, discharged home on 9/24/20, prior visit 5/2021 after returned back from [facility] with borderline SBP reduced carvedilol dose to 12.5mg, last visit 11/2021 interval with recurrent CVAs had repeat TTE with Definity confirmed recurrence of LV apical thrombus, restarted back on warfarin, last seen in office 1/2022, interval readmitted to [facility] 2/28/22 for chest pain, CTA done with probable focal dissection at the distal abdominal aorta, repeat LHC shown extensive instent restenosis in mLAD with prior 2 layers of stent, patent RCA and LCx stents, patient referred for brachytherapy at [facility] with Dr. Hannah Cheatham found 90% ISR at mLAD unable to deliver catheter to the distal segment with partial improvement of proximal half of the vessel (80% mLAD), last visit 4/2022 still with recurrent stable anginal symptoms, interval followed with EPS/Dr. Barfield had 1 week Holter done with 6% PVCs, last seen 11/2022, interval followed with EP pending repeat MCOT on future follow up if PVC burden >10% the required intervention, last office visit 4/2023 overall stable and therapeutic INR, interval been to [facility] on 5/24/23 for sudden fatigue, elevated BP and chest pain, presents for follow up.  \par \par \par Unclear etiology of recent acute event prompted outside hospitalization as without hypotensive episode, need to review recent cardiac workup at outside hospital. No change in stable chronic anginal symptoms, reiterated that unfortunately no interventional option possible, not candidate for CABG as no distal target, continue antianginals and reassured no need to be overtly concern about anginal chest pain. EKG unchanged old infarct with chronic aneurysmal changes. Still with labile BP after Imdur/nitro use, advised not to skip antihypertensives/GDMT unless SBP <100 or with dizziness. EKG with old infarct pattern. \par \par Prior CVA in setting of recurrent LV thrombus when off anticoagulant, resumed on warfarin with therapeutic monthly INR checks, need level for this month and recheck CBC/CMP level. AICD interrogated no recent arrhythmic event since interrogated on 5/26/2/23. \par \par Chronic angina with EKG unchanged. Recurrent angina despite prior PCI/PEPE, continue current antianginals increase Ranexa to 1000mg BID as reports dizziness with higher dose of Imdur use, advised to take sublingual nitro PRN, carvedilol 12.5mg BID given reports of dizziness/borderline SBP 90-100s at time. AICD interrogated today no VT/VF event and with 7.3 years battery life. \par \par \par 1. CAD with multiple stents and instent-restenosis of dLAD s/p PEPE x2 9/2020 and failed brachytherapy 4/2022- off ASA and continue clopidogrel with warfarin; continue carvedilol to 12.5mg BID and continue Imdur 30mg if BP allows and Ranexa 1000mg BID, advised to use sublingual nitrate PRN limited by low BP effect, Continue high dose atorvastatin 80mg. \par \par 2. Ischemic cardiomyopathy, HFrEF- ACC/AHA stage B- continue carvedilol 12.5g BID and losartan 25mg due to borderline low BP, continue spironolactone 25mg. ICD remote monitoring and follow up with EP. \par \par 3. LV apical thrombus with prior CVA- was off warfarin since 2020, repeat TTE with echocontrast with recurrence of LV thrombus due to aneurysmal region, continue warfarin INR goal 2-3 indefinitely with monthly INR checks, prior repeat Echo with resolution of thrombus but high risk of recurrence given aneurysmal segment.  \par \par 4. History of 2:1 heart block, prior NSVT- AICD interrogated during hospitalization; continue carvedilol, EPS followup for ICD monitoring, still with 7yrs battery remaining, no defib or VT. Holter with 6% PVCs burden on carvedilol. \par \par 5.Prior CTA abdomen questionable distal focal dissection?-  abdominal aortic Duplex confirmed findings in 5/2022, currently no abdominal discomfort, did not see vascular surgery advised to follow up when his insurance is active; continue strict BP control.  \par \par 6. Dizziness/fatigue- unknown etiology, if worsening to return call to office. \par \par \par \par Follow up in 4 months. \par \par  [EKG obtained to assist in diagnosis and management of assessed problem(s)] : EKG obtained to assist in diagnosis and management of assessed problem(s)

## 2023-05-30 NOTE — CARDIOLOGY SUMMARY
[___] : [unfilled] [LVEF ___%] : LVEF [unfilled]% [de-identified] : 11/15/21- sinus 75, normal axis, inferior infarct, precordial ST, lateral T-wave inversion, QTc 396\par 1/21/22- sinus 75, normal axis, inferolateral infarct, precordial ST, lateral T-wave inversion, QTc 386\par 4/26/22- sinus 69, normal axis, inferolateral infarct, precordial ST, lateral TWI, QTc 407\par 7/26/22- sinus 61, normal axis, inferolateral infarct, precordial ST elevation, lateral TWI, QTc 409\par 4/4/23- sinus 67, normal axis, inferolateral infarct, precordial ST elevation, QTc 394\par 11/28/22- sinus 71, normal axis, inferolateral infarct, lateral T-wave inversions, QTc 409\par 5/30/23- sinus 71, normal axis, inferolateral infarct, lateral T-wave inversion, QTc 407 [de-identified] : 11/22/21- LV EF 40-45%, apical aneurysmal with slow flow, 0.6 x 0.7 cm thrombus [de-identified] : 2/26/22- \par Summary:\par  1. Normal left ventricular internal cavity size.\par  2. There is moderate concentric left ventricular hypertrophy.\par  3. Entire apex, basal inferoseptal segment, and basal inferior segment \par are abnormal as described above.\par  4. Ischemic cardiomyopathy.\par  5. Mildly decreased global left ventricular systolic function.\par  6. Left ventricular ejection fraction, by visual estimation, is 40 to \par 45%.\par  7. Normal right ventricular size and function.\par  8. Mild thickening of the anterior and posterior mitral valve leaflets.\par  9. Trace mitral valve regurgitation.\par 10. There is no evidence of pericardial effusion.\par 11. Endocardial visualization was enhanced with intravenous echo contrast. [de-identified] : 2/28/22- VENTRICLES: No LV gram was performed; however, a recent echocardiogram\par demonstrated an EF of 45 %.\par CORONARY VESSELS: The coronary circulation is right dominant.\par LM:   --  LM: Normal.\par LAD:   --  Mid LAD: There was a diffuse 90 % stenosis at the site of a\par prior stent, in-stent. There was LOLIS grade 3 flow through the vessel\par (brisk flow).\par --  Distal LAD: There was a diffuse 80 % stenosis in-stent.\par --  D1: Normal.\par CX:   --  Circumflex: The vessel was medium to large sized.\par --  Mid circumflex: There was a diffuse 10 % stenosis at the site of a\par prior stent.\par RCA:   --  RCA: The vessel arose abnormally high from the right sinus of\par Valsalva.\par --  Mid RCA: There was a diffuse 20 % stenosis at the site of a prior\par stent.\par COMPLICATIONS: No complications occurred during the cath lab visit.\par DIAGNOSTIC IMPRESSIONS: Extensive instent restenosis in mLAD in atleast 2\par layers of stents.\par Prior stents in RCA and LCX patent\par Aortic valve not crossed and ventriculography not performed due to the\par presence of an LV thrombus (11/21)\par DIAGNOSTIC RECOMMENDATIONS: Medical management of CAD and angina with\par optimal medical therapy\par Consider Brachytherapy as a possible treatment modality\par INTERVENTIONAL IMPRESSIONS: Extensive instent restenosis in mLAD in atleast\par 2 layers of stents.\par Prior stents in RCA and LCX patent\par Aortic valve not crossed and ventriculography not performed due to the\par presence of an LV thrombus (11/21)\par INTERVENTIONAL RECOMMENDATIONS: Medical management of CAD and angina with\par optimal medical therapy\par Consider Brachytherapy as a possible treatment modality\par \par 4/13/22- \par \par Following intervention there is a 80 % residual stenosis. There was\par LOLIS Flow 3 before the procedure and LOLIS Flow 3 following\par the procedure. Following intervention there is a partially improved\par angiographic appearance.

## 2023-05-30 NOTE — HISTORY OF PRESENT ILLNESS
[FreeTextEntry1] : 52M Creole-speaking man with history of CAD with multivessel stentings and MI (2017 at [facility] with restenosis of RCA and LAD s/p PEPE to mRCA), LV apical thrombus (previously been on warfarin since  discontinued on ), HFrEF (35-40% in ), syncope with 2:1 heart block s/p Medtronic AICD 2018 (DDD ), CVA (2019) and with unstable angina in 2019 s/p PCI/stent to mLCx at [facility], previously follows at [facility] with Dr. Crook but since left practice without follow up, presented to [facility] on 20 with unstable angina, serial Troponin negative, repeaT TTE with EF 40-45% (upon my own review) and resolved LV apical thrombus (known since 2019 but unclear reason remains on warfarin and self discontinued clopidogrel 75mg since 2019), underwent LHC found with patent stents in LCx, RCA and moderate-severe instent restenosis of [id number] LAD, given small caliber size of the vessel decided medical management with antianginal (increased Imdur to 120mg), presented for initial outpatient cardiology evaluation on 9/15/20, added Ranexa to Imdur for severe angina, still with refractory symptoms and he presented again to [facility] on 20, underwent PCI/PEPE x2 to dLAD for 95% severe instent restenosis, repeat TTE off warfarin found with apical akinesis/aneurysm with calcified mural thrombus, LV EF 40-45%, had 21 beats WCT, increased carvedilol dose to 25mg BID, discharged home on 20, prior visit 2021 after [facility] from [facility] with borderline SBP reduced carvedilol dose to 12.5mg, last visit 2021 interval with recurrent CVAs had repeat TTE with Definity confirmed recurrence of LV apical thrombus, restarted back on warfarin, last seen in office 2022, interval readmitted to [facility] 22 for chest pain, CTA done with probable focal dissection at the distal abdominal aorta, repeat LHC shown extensive instent restenosis in mLAD with prior 2 layers of stent, patent RCA and LCx stents, patient referred for brachytherapy at [facility] with Dr. Hannah Cheatham found 90% ISR at mLAD unable to deliver catheter to the distal segment with partial improvement of proximal half of the vessel (80% mLAD), last visit 2022 still with recurrent stable anginal symptoms, interval followed with EPS/Dr. Barfield had 1 week Holter done with 6% PVCs, last seen 2022, interval followed with EP pending repeat MCOT on future follow up if PVC burden >10% the required intervention, last office visit 2023 overall stable and therapeutic INR, interval been to [facility] on 23 for sudden fatigue, elevated BP and chest pain, presents for follow up.  \par \par Patient presents with his spouse for the visit. \par He reports last week with sudden fatigue and lethargy around 8pm with SBP 150s, was normal during the day and walked without limitations, he was taken to [facility], had CT head done, serial Troponin flat, but underwent diagnostic left heart cath via RRA without intervention, also reportedly AICD was interrogated without event, denies AICD shock. Has been feeling lightheaded and dizzy, alos gets headaches when using Imdur. \par \par \par \par Prior visit 2023:\par Recent INR 2.4 been therapeutic at goal >4 months. \par \par Reports still with labile BP with stable intermittent chest pain, active with walking and exercise without chest pain, denies bleeding. No ICD shock and recently had remote monitoring set up at home. Reports constipation and cramps in his calves at times. \par \par \par Prior visit 2022: \par Recent INR 2.6 on , but missed August and October INR checks. \par \par Patient presents with spouse for the visit. No change in his symptoms with chronic angina but still able to ambulate for 45 minutes. Sees some blood after he brush his teeth pending dental eval, denies other bleeding except for noticing some dark colored stool at time. He has completed his COVID vaccines about 3 month ago. Denies AICD shock, has intermittent lower abdominal pain about once monthly. Reports skip his BP meds at time when he sees -110s. \par \par \par Prior visit 2022: \par Monthly INR been therapeutic 2.4\par \par Patient presents with his spouse for the visit. \par Still having recurrent resting chest pain at rest can last for 30 minutes and at times up to 2 days, denies pain with ambulation can walk up to 30 minutes, had tried sublingual nitro no effect except dropping his BP. Denies bleeding on warfarin and clopidogrel use. Reports intermittent cough for 2 months concern if its from lisinopril use. \par \par \par \par Prior visit 2022: \par Patient again presents with his partner for the visit. \par Still having daily anginal chest pain at rest lasting for 15-20 minutes, has not used sublingual nitroglycerin but can lead to low SBP 90s and dizziness. Been adherent with all of his other medications. No ICD shock or syncope, has intermittent palpitations. \par \par Continues to decline COVID vacccine due to not feeling with dizziness, bodyaches\par \par \par Prior visit 2022: \par Still has chronic angina, takes sublingual only once. Has labile BP with high readings SBP 170s but then would be low after nitroglycerin use. Denies bleeding on warfarin/clopidogrel use. \par \par Still not yet obtain COVID vaccine. \par \par Prior visit 2021: \par He's been living in [place] with his significant other, had been hospitalized twice there, 1st episode episode in 2021 for chest pain had Echo and nuclear stress test done showed no ischemia, 2nd admission 10/2021 for acute stroke symptoms (facial droop and L-sided weakness for few hours), symptoms completely resolved once in the ER, noted to be hypertensive 184/104, did not undergo MRI brain cause he was claustrophobic but repeat CT head done no new infarct, LDL 89 increased atorvastatin to 80mg. \par \par Been feeling well since returning back to [place], still has chronic angina at rest, able to walk for 30 minutes, still with nonspecific paresthesia sensation of hotness in his body and feeling dizzy after his medications use. \par \par Remains hesitant about COVID vaccine. \par \par Prior visit 2021\par Patient presents with is sister-in-law requesting her to interpret. \par \par He left for [facility] since 10/2020 and just returned this week, still with intermittent chronic angina, had one episode yesterday lasted for over 1 hour, does not take sublingual nitro as reports cause his BP to be high, but when take his meds would see SBP 90-100s and would take Imdur 30 BID instead, self paying for all his medications. Having intermittent palpitations lasting few seconds and lasted for few hours, denies ICD shock. Currently with his travel visa expiring in July. \par \par Has not yet schedule for the COVID vaccine due to concern from his wife. \par \par \par Prior visit 2020:\par He reports still having chest pain daily and sometime can last throughout the day, has not tried sublingual nitro. Sometimes feel as fire sensation, doesn't take any PPI/antacids. He's planning to return to [facility] for 4-5 months to resume PT for his R-leg from prior stroke. \par \par Prior visit 9/15/20:\par Patient reports SBP 80-90s with dizziness at times when he uses Imdur 120mg, continues to have L-sided chest pain but mainly at night and in the morning, at times also with ambulation, typically last for about 30 minutes. He remains compliant with ASA/clopidogrel, off warfarin. He is planning ot return to [facility] in about 1-2 month as his visa is about to be . \par \par \par Lipid panel:\par TG 58, , HDL 39, LDL 54

## 2023-06-19 ENCOUNTER — RX RENEWAL (OUTPATIENT)
Age: 53
End: 2023-06-19

## 2023-07-13 ENCOUNTER — NON-APPOINTMENT (OUTPATIENT)
Age: 53
End: 2023-07-13

## 2023-07-26 ENCOUNTER — NON-APPOINTMENT (OUTPATIENT)
Age: 53
End: 2023-07-26

## 2023-07-26 LAB
INR PPP: 2.56 RATIO
PT BLD: 28.2 SEC

## 2023-08-02 NOTE — ED PROVIDER NOTE - CHPI ED SYMPTOMS POS
-We will fax order for lab draw to check B12 to [facility] labs in [place]  -Resume Trulicity at a lower dose of 0.75 mg weekly--stop if swelling of tongue or dry mouth worsen and update our clinic  -Keep well hydrated  -For now, continue glipizide 10 mg twice daily  -If noticing blood glucose is under 70 once or more a week, reduce glipizide to 10 mg once daily  -Check blood glucose once daily as much as possible at alternating times, first thing in the morning on one day and before dinner on another; also check with any unusual symptoms that could be due to low blood glucose  -Referral to nephrology  -Return for a follow-up visit in November, with labs before visit  -We will communicate results by letter, or if needed by phone  
PALPITATIONS/DIZZINESS

## 2023-09-27 LAB
INR PPP: 2.82 RATIO
PT BLD: 30.9 SEC

## 2023-10-01 PROBLEM — I20.2 REFRACTORY ANGINA: Status: ACTIVE | Noted: 2022-02-28

## 2023-10-09 ENCOUNTER — APPOINTMENT (OUTPATIENT)
Dept: CARDIOLOGY | Facility: CLINIC | Age: 53
End: 2023-10-09
Payer: MEDICAID

## 2023-10-09 ENCOUNTER — NON-APPOINTMENT (OUTPATIENT)
Age: 53
End: 2023-10-09

## 2023-10-09 VITALS
HEIGHT: 68 IN | OXYGEN SATURATION: 98 % | SYSTOLIC BLOOD PRESSURE: 116 MMHG | BODY MASS INDEX: 30.04 KG/M2 | WEIGHT: 198.2 LBS | TEMPERATURE: 99.1 F | HEART RATE: 76 BPM | DIASTOLIC BLOOD PRESSURE: 80 MMHG

## 2023-10-09 PROCEDURE — 99214 OFFICE O/P EST MOD 30 MIN: CPT | Mod: 25

## 2023-10-09 PROCEDURE — 93000 ELECTROCARDIOGRAM COMPLETE: CPT

## 2023-10-09 RX ORDER — RANOLAZINE 1000 MG/1
1000 TABLET, EXTENDED RELEASE ORAL
Qty: 180 | Refills: 3 | Status: ACTIVE | COMMUNITY
Start: 2020-09-15 | End: 1900-01-01

## 2023-10-09 RX ORDER — TAMSULOSIN HYDROCHLORIDE 0.4 MG/1
0.4 CAPSULE ORAL DAILY
Refills: 0 | Status: ACTIVE | COMMUNITY

## 2023-10-09 RX ORDER — ATORVASTATIN CALCIUM 80 MG/1
80 TABLET, FILM COATED ORAL
Qty: 90 | Refills: 3 | Status: ACTIVE | COMMUNITY
Start: 2020-09-29 | End: 1900-01-01

## 2023-10-09 RX ORDER — CLOPIDOGREL BISULFATE 75 MG/1
75 TABLET, FILM COATED ORAL
Qty: 90 | Refills: 3 | Status: ACTIVE | COMMUNITY
Start: 2020-09-14 | End: 1900-01-01

## 2023-10-09 RX ORDER — SPIRONOLACTONE 25 MG/1
25 TABLET ORAL
Qty: 90 | Refills: 2 | Status: ACTIVE | COMMUNITY
Start: 2020-09-29 | End: 1900-01-01

## 2023-10-09 RX ORDER — ISOSORBIDE MONONITRATE 30 MG/1
30 TABLET, EXTENDED RELEASE ORAL
Qty: 90 | Refills: 3 | Status: ACTIVE | COMMUNITY
Start: 1900-01-01 | End: 1900-01-01

## 2023-10-09 RX ORDER — LOSARTAN POTASSIUM 25 MG/1
25 TABLET, FILM COATED ORAL DAILY
Qty: 90 | Refills: 2 | Status: ACTIVE | COMMUNITY
Start: 2022-07-26 | End: 1900-01-01

## 2023-10-09 RX ORDER — CARVEDILOL 12.5 MG/1
12.5 TABLET, FILM COATED ORAL
Qty: 180 | Refills: 3 | Status: ACTIVE | COMMUNITY
Start: 2020-09-14 | End: 1900-01-01

## 2023-11-01 ENCOUNTER — APPOINTMENT (OUTPATIENT)
Dept: ELECTROPHYSIOLOGY | Facility: CLINIC | Age: 53
End: 2023-11-01
Payer: MEDICAID

## 2023-11-01 ENCOUNTER — NON-APPOINTMENT (OUTPATIENT)
Age: 53
End: 2023-11-01

## 2023-11-01 VITALS
BODY MASS INDEX: 29.25 KG/M2 | WEIGHT: 193 LBS | HEART RATE: 78 BPM | SYSTOLIC BLOOD PRESSURE: 114 MMHG | DIASTOLIC BLOOD PRESSURE: 74 MMHG | OXYGEN SATURATION: 97 % | HEIGHT: 68 IN

## 2023-11-01 DIAGNOSIS — R55 SYNCOPE AND COLLAPSE: ICD-10-CM

## 2023-11-01 DIAGNOSIS — I50.9 HEART FAILURE, UNSPECIFIED: ICD-10-CM

## 2023-11-01 PROCEDURE — 99213 OFFICE O/P EST LOW 20 MIN: CPT | Mod: 25

## 2023-11-01 PROCEDURE — 93000 ELECTROCARDIOGRAM COMPLETE: CPT

## 2023-11-02 ENCOUNTER — NON-APPOINTMENT (OUTPATIENT)
Age: 53
End: 2023-11-02

## 2023-11-02 RX ORDER — GABAPENTIN 100 MG/1
100 CAPSULE ORAL
Qty: 60 | Refills: 0 | Status: ACTIVE | COMMUNITY
Start: 2023-06-19 | End: 1900-01-01

## 2023-12-22 ENCOUNTER — LABORATORY RESULT (OUTPATIENT)
Age: 53
End: 2023-12-22

## 2024-01-08 ENCOUNTER — APPOINTMENT (OUTPATIENT)
Dept: CARDIOLOGY | Facility: CLINIC | Age: 54
End: 2024-01-08
Payer: MEDICAID

## 2024-01-08 VITALS
HEIGHT: 68 IN | BODY MASS INDEX: 29.86 KG/M2 | HEART RATE: 71 BPM | WEIGHT: 197 LBS | SYSTOLIC BLOOD PRESSURE: 112 MMHG | DIASTOLIC BLOOD PRESSURE: 72 MMHG | OXYGEN SATURATION: 95 %

## 2024-01-08 DIAGNOSIS — Z95.810 PRESENCE OF AUTOMATIC (IMPLANTABLE) CARDIAC DEFIBRILLATOR: ICD-10-CM

## 2024-01-08 PROCEDURE — 99214 OFFICE O/P EST MOD 30 MIN: CPT

## 2024-01-08 RX ORDER — PANTOPRAZOLE 40 MG/1
40 TABLET, DELAYED RELEASE ORAL DAILY
Qty: 90 | Refills: 1 | Status: ACTIVE | COMMUNITY
Start: 2024-01-08 | End: 1900-01-01

## 2024-01-08 NOTE — HISTORY OF PRESENT ILLNESS
[FreeTextEntry1] : 53M Creole-speaking man with history of CAD with multivessel stentings and MI (2017 at [facility] with restenosis of RCA and LAD s/p PEPE to mRCA), LV apical thrombus (previously been on warfarin since  discontinued on ), HFrEF (35-40% in ), syncope with 2:1 heart block s/p Medtronic AICD 2018 (DDD ), CVA (2019) and with unstable angina in 2019 s/p PCI/stent to mLCx at [facility], previously follows at [facility] with Dr. Crook but since left practice without follow up, presented to [facility] on 20 with unstable angina, serial Troponin negative, repeaT TTE with EF 40-45% (upon my own review) and resolved LV apical thrombus (known since 2019 but unclear reason remains on warfarin and self discontinued clopidogrel 75mg since 2019), underwent LHC found with patent stents in LCx, RCA and moderate-severe instent restenosis of [id number] LAD, given small caliber size of the vessel decided medical management with antianginal (increased Imdur to 120mg), presented for initial outpatient cardiology evaluation on 9/15/20, added Ranexa to Imdur for severe angina, still with refractory symptoms and he presented again to [facility] on 20, underwent PCI/PEPE x2 to dLAD for 95% severe instent restenosis, repeat TTE off warfarin found with apical akinesis/aneurysm with calcified mural thrombus, LV EF 40-45%, had 21 beats WCT, increased carvedilol dose to 25mg BID, discharged home on 20, prior visit 2021 after returned back from [facility] with borderline SBP reduced carvedilol dose to 12.5mg, last visit 2021 interval with recurrent CVAs had repeat TTE with Definity confirmed recurrence of LV apical thrombus, restarted back on warfarin, last seen in office 2022, interval readmitted to [facility] 22 for chest pain, CTA done with probable focal dissection at the distal abdominal aorta, repeat LHC shown extensive instent restenosis in mLAD with prior 2 layers of stent, patent RCA and LCx stents, patient referred for brachytherapy at [facility] with Dr. Hannah Cheatham found 90% ISR at mLAD unable to deliver catheter to the distal segment with partial improvement of proximal half of the vessel (80% mLAD), last visit 2022 still with recurrent stable anginal symptoms, interval followed with EPS/Dr. Barfield had 1 week Holter done with 6% PVCs, last seen 2022, interval followed with EP pending repeat MCOT on future follow up if PVC burden >10% the required intervention, last office visit 2023 overall stable and therapeutic INR, interval been to [facility] on 23 for sudden fatigue, elevated BP and chest pain, last seen 2023, recent INR been therapeutic initially was planning for elective colonoscopy but after stool occult blood negative told not needed, last seen 10/2023, recent INR remain therapeutic 2.4, interval seen by EP/Dr. Aguilar repeat Holter done with 2.6% PVCs burden, presents for follow up.    Patient presents with spouse for the visit interpreting for him, reports he had L-sided chest pain for 1 week recently, mainly at rest, also had acid reflux with vomiting once after ate spicy foods, also having chest/abdomen pain when he does weight/sit up exercise. He was recommended to take Total Restore supplement for gas pain and regulate his BM, did not follow up with GI since with intermittent lower abdominal pain. Has been feeling dizzy with vertigo symptoms, no recent syncope.    Prior visit 10/2023:  Reports feeling well, overall unchanged baseline intermittent chest pain and still have labile SBP at home high and low, still feeling hot with head discomfort even in the colder weather and would see his SBP increases from 110 to 150s in 15-20 minutes.  Follows with a PCP reportedly had thyroid check recently. Denies bleeding except when he brushes his teeth, denies falls. Denies AICD discharge or syncope.   Prior visit 2023:  Patient presents with his spouse for the visit.  He reports last week with sudden fatigue and lethargy around 8pm with SBP 150s, was normal during the day and walked without limitations, he was taken to [facility], had CT head done, serial Troponin flat, but underwent diagnostic left heart cath via RRA without intervention, also reportedly AICD was interrogated without event, denies AICD shock. Has been feeling lightheaded and dizzy, alos gets headaches when using Imdur.     Prior visit 2023: Recent INR 2.4 been therapeutic at goal >4 months.   Reports still with labile BP with stable intermittent chest pain, active with walking and exercise without chest pain, denies bleeding. No ICD shock and recently had remote monitoring set up at home. Reports constipation and cramps in his calves at times.    Prior visit 2022:  Recent INR 2.6 on , but missed August and October INR checks.   Patient presents with spouse for the visit. No change in his symptoms with chronic angina but still able to ambulate for 45 minutes. Sees some blood after he brush his teeth pending dental eval, denies other bleeding except for noticing some dark colored stool at time. He has completed his COVID vaccines about 3 month ago. Denies AICD shock, has intermittent lower abdominal pain about once monthly. Reports skip his BP meds at time when he sees -110s.    Prior visit 2022:  Monthly INR been therapeutic 2.4  Patient presents with his spouse for the visit.  Still having recurrent resting chest pain at rest can last for 30 minutes and at times up to 2 days, denies pain with ambulation can walk up to 30 minutes, had tried sublingual nitro no effect except dropping his BP. Denies bleeding on warfarin and clopidogrel use. Reports intermittent cough for 2 months concern if its from lisinopril use.     Prior visit 2022:  Patient again presents with his partner for the visit.  Still having daily anginal chest pain at rest lasting for 15-20 minutes, has not used sublingual nitroglycerin but can lead to low SBP 90s and dizziness. Been adherent with all of his other medications. No ICD shock or syncope, has intermittent palpitations.   Continues to decline COVID vacccine due to not feeling with dizziness, bodyaches   Prior visit 2022:  Still has chronic angina, takes sublingual only once. Has labile BP with high readings SBP 170s but then would be low after nitroglycerin use. Denies bleeding on warfarin/clopidogrel use.   Still not yet obtain COVID vaccine.   Prior visit 2021:  He's been living in [place] with his significant other, had been hospitalized twice there, 1st episode episode in 2021 for chest pain had Echo and nuclear stress test done showed no ischemia, 2nd admission 10/2021 for acute stroke symptoms (facial droop and L-sided weakness for few hours), symptoms completely resolved once in the ER, noted to be hypertensive 184/104, did not undergo MRI brain cause he was claustrophobic but repeat CT head done no new infarct, LDL 89 increased atorvastatin to 80mg.   Been feeling well since returning back to [place], still has chronic angina at rest, able to walk for 30 minutes, still with nonspecific paresthesia sensation of hotness in his body and feeling dizzy after his medications use.   Remains hesitant about COVID vaccine.   Prior visit 2021 Patient presents with is sister-in-law requesting her to interpret.   He left for [facility] since 10/2020 and just returned this week, still with intermittent chronic angina, had one episode yesterday lasted for over 1 hour, does not take sublingual nitro as reports cause his BP to be high, but when take his meds would see SBP 90-100s and would take Imdur 30 BID instead, self paying for all his medications. Having intermittent palpitations lasting few seconds and lasted for few hours, denies ICD shock. Currently with his travel visa expiring in July.   Has not yet schedule for the COVID vaccine due to concern from his wife.    Prior visit 2020: He reports still having chest pain daily and sometime can last throughout the day, has not tried sublingual nitro. Sometimes feel as fire sensation, doesn't take any PPI/antacids. He's planning to return to [facility] for 4-5 months to resume PT for his R-leg from prior stroke.   Prior visit 9/15/20: Patient reports SBP 80-90s with dizziness at times when he uses Imdur 120mg, continues to have L-sided chest pain but mainly at night and in the morning, at times also with ambulation, typically last for about 30 minutes. He remains compliant with ASA/clopidogrel, off warfarin. He is planning ot return to [facility] in about 1-2 month as his visa is about to be .    Lipid panel: TG 58, , HDL 39, LDL 54

## 2024-01-08 NOTE — CARDIOLOGY SUMMARY
[___] : [unfilled] [LVEF ___%] : LVEF [unfilled]% [de-identified] : 11/15/21- sinus 75, normal axis, inferior infarct, precordial ST, lateral T-wave inversion, QTc 396 1/21/22- sinus 75, normal axis, inferolateral infarct, precordial ST, lateral T-wave inversion, QTc 386 4/26/22- sinus 69, normal axis, inferolateral infarct, precordial ST, lateral TWI, QTc 407 7/26/22- sinus 61, normal axis, inferolateral infarct, precordial ST elevation, lateral TWI, QTc 409 4/4/23- sinus 67, normal axis, inferolateral infarct, precordial ST elevation, QTc 394 11/28/22- sinus 71, normal axis, inferolateral infarct, lateral T-wave inversions, QTc 409 5/30/23- sinus 71, normal axis, inferolateral infarct, lateral T-wave inversion, QTc 407 10/9/23- sinus 68, 1st degree AV block, inferolateral infarct (old), T-wave inversion lateral leads, QTc 419 [de-identified] : 11/22/21- LV EF 40-45%, apical aneurysmal with slow flow, 0.6 x 0.7 cm thrombus [de-identified] : 2/26/22- \par  Summary:\par   1. Normal left ventricular internal cavity size.\par   2. There is moderate concentric left ventricular hypertrophy.\par   3. Entire apex, basal inferoseptal segment, and basal inferior segment \par  are abnormal as described above.\par   4. Ischemic cardiomyopathy.\par   5. Mildly decreased global left ventricular systolic function.\par   6. Left ventricular ejection fraction, by visual estimation, is 40 to \par  45%.\par   7. Normal right ventricular size and function.\par   8. Mild thickening of the anterior and posterior mitral valve leaflets.\par   9. Trace mitral valve regurgitation.\par  10. There is no evidence of pericardial effusion.\par  11. Endocardial visualization was enhanced with intravenous echo contrast. [de-identified] : 2/28/22- VENTRICLES: No LV gram was performed; however, a recent echocardiogram\par  demonstrated an EF of 45 %.\par  CORONARY VESSELS: The coronary circulation is right dominant.\par  LM:   --  LM: Normal.\par  LAD:   --  Mid LAD: There was a diffuse 90 % stenosis at the site of a\par  prior stent, in-stent. There was LOLIS grade 3 flow through the vessel\par  (brisk flow).\par  --  Distal LAD: There was a diffuse 80 % stenosis in-stent.\par  --  D1: Normal.\par  CX:   --  Circumflex: The vessel was medium to large sized.\par  --  Mid circumflex: There was a diffuse 10 % stenosis at the site of a\par  prior stent.\par  RCA:   --  RCA: The vessel arose abnormally high from the right sinus of\par  Valsalva.\par  --  Mid RCA: There was a diffuse 20 % stenosis at the site of a prior\par  stent.\par  COMPLICATIONS: No complications occurred during the cath lab visit.\par  DIAGNOSTIC IMPRESSIONS: Extensive instent restenosis in mLAD in atleast 2\par  layers of stents.\par  Prior stents in RCA and LCX patent\par  Aortic valve not crossed and ventriculography not performed due to the\par  presence of an LV thrombus (11/21)\par  DIAGNOSTIC RECOMMENDATIONS: Medical management of CAD and angina with\par  optimal medical therapy\par  Consider Brachytherapy as a possible treatment modality\par  INTERVENTIONAL IMPRESSIONS: Extensive instent restenosis in mLAD in atleast\par  2 layers of stents.\par  Prior stents in RCA and LCX patent\par  Aortic valve not crossed and ventriculography not performed due to the\par  presence of an LV thrombus (11/21)\par  INTERVENTIONAL RECOMMENDATIONS: Medical management of CAD and angina with\par  optimal medical therapy\par  Consider Brachytherapy as a possible treatment modality\par  \par  4/13/22- \par  \par  Following intervention there is a 80 % residual stenosis. There was\par  LOLIS Flow 3 before the procedure and LOLIS Flow 3 following\par  the procedure. Following intervention there is a partially improved\par  angiographic appearance.

## 2024-01-08 NOTE — DISCUSSION/SUMMARY
[FreeTextEntry1] : 53M Creole-speaking man with history of CAD with multivessel stentings and MI (2017 at [facility] with restenosis of RCA and LAD s/p PEPE to mRCA), LV apical thrombus (previously been on warfarin since 2017 discontinued on 2020), HFrEF (35-40% in 2018), syncope with 2:1 heart block s/p Medtronic AICD 5/2018 (DDD ), CVA (6/2019) and with unstable angina in 11/2019 s/p PCI/stent to mLCx at [facility], previously follows at [facility] with Dr. Crook but since left practice without follow up, presented to [facility] on 9/2/20 with unstable angina, serial Troponin negative, repeaT TTE with EF 40-45% (upon my own review) and resolved LV apical thrombus (known since 11/2019 but unclear reason remains on warfarin and self discontinued clopidogrel 75mg since 7/2019), underwent LHC found with patent stents in LCx, RCA and moderate-severe instent restenosis of pis-br-opdyzz LAD, given small caliber size of the vessel decided medical management with antianginal (increased Imdur to 120mg), presented for initial outpatient cardiology evaluation on 9/15/20, added Ranexa to Imdur for severe angina, still with refractory symptoms and he presented again to [facility] on 9/23/20, underwent PCI/PEPE x2 to dLAD for 95% severe instent restenosis, repeat TTE off warfarin found with apical akinesis/aneurysm with calcified mural thrombus, LV EF 40-45%, had 21 beats WCT, increased carvedilol dose to 25mg BID, discharged home on 9/24/20, prior visit 5/2021 after returned back from [facility] with borderline SBP reduced carvedilol dose to 12.5mg, last visit 11/2021 interval with recurrent CVAs had repeat TTE with [facility] confirmed recurrence of LV apical thrombus, restarted back on warfarin, last seen in office 1/2022, interval readmitted to [facility] 2/28/22 for chest pain, CTA done with probable focal dissection at the distal abdominal aorta, repeat LHC shown extensive instent restenosis in mLAD with prior 2 layers of stent, patent RCA and LCx stents, patient referred for brachytherapy at [facility] with Dr. Hannah Cheatham found 90% ISR at mLAD unable to deliver catheter to the distal segment with partial improvement of proximal half of the vessel (80% mLAD), last visit 4/2022 still with recurrent stable anginal symptoms, interval followed with EPS/Dr. Barfield had 1 week Holter done with 6% PVCs, last seen 11/2022, interval followed with EP pending repeat MCOT on future follow up if PVC burden >10% the required intervention, last office visit 4/2023 overall stable and therapeutic INR, interval been to [facility] on 5/24/23 for sudden fatigue, elevated BP and chest pain, last seen 5/2023, recent INR been therapeutic initially was planning for elective colonoscopy but after stool occult blood negative told not needed, last seen 10/2023, recent INR remain therapeutic 2.4, interval seen by EP/Dr. Aguilar repeat Holter done with 2.6% PVCs burden, presents for follow up.     Overall stable has unchanged stable chronic anginal symptoms, reiterated that unfortunately no interventional option possible, not candidate for CABG as no distal target, continue antianginals on Imdur  and Ranexa, reassured no need to be overtly concern about anginal chest pain. Prior EKG unchanged old infarct with chronic aneurysmal changes. Still with labile BP after Imdur/nitro use, advised not to skip antihypertensives/GDMT unless SBP <100 or with dizziness. Advised to follow up with GI for EGD/colonoscopy if recurrent abdominal pain, trial of pantoprazole for GERD.   Prior CVA in setting of recurrent LV thrombus when off anticoagulant, resumed on warfarin with therapeutic monthly INR checks has been therapeutic. AICD monitoring with EP.     1. CAD with multiple stents and instent-restenosis of dLAD s/p PEPE x2 9/2020 and failed brachytherapy 4/2022- off ASA and continue clopidogrel with warfarin; continue carvedilol to 12.5mg BID and continue Imdur 30mg if BP allows and Ranexa 1000mg BID, advised to use sublingual nitrate PRN limited by low BP effect, Continue high dose atorvastatin 80mg.   2. Ischemic cardiomyopathy, HFrEF- ACC/AHA stage B- continue carvedilol 12.5g BID and losartan 25mg due to borderline low BP, continue spironolactone 25mg. ICD remote monitoring and follow up with EP.   3. LV apical thrombus with prior CVA- was off warfarin since 2020, repeat TTE with echocontrast with recurrence of LV thrombus due to aneurysmal region, continue warfarin INR goal 2-3 indefinitely with monthly INR checks, prior repeat Echo with resolution of thrombus but high risk of recurrence given aneurysmal segment.    4. History of 2:1 heart block, prior NSVT- AICD interrogated during hospitalization; continue carvedilol, EPS followup for ICD monitoring, still with 7yrs battery remaining, no defib or VT. Holter with 6% PVCs burden on carvedilol.   5.Prior CTA abdomen questionable distal focal dissection?-  abdominal aortic Duplex confirmed findings in 5/2022, did not see vascular surgery advised to follow up, continue strict BP control.    6. Dizziness/fatigue- unknown etiology, advised to follow up with PCP may need ENT eval.   7. GERD/abdominal discomfort- trial of PPI, advised to follow up with GI.   He declined influenza vaccination.    Follow up in 6 months.

## 2024-01-08 NOTE — REVIEW OF SYSTEMS
[Chest Discomfort] : chest discomfort [Abdominal Pain] : abdominal pain [Dizziness] : dizziness [Negative] : Heme/Lymph

## 2024-01-24 ENCOUNTER — LABORATORY RESULT (OUTPATIENT)
Age: 54
End: 2024-01-24

## 2024-01-31 ENCOUNTER — NON-APPOINTMENT (OUTPATIENT)
Age: 54
End: 2024-01-31

## 2024-01-31 ENCOUNTER — APPOINTMENT (OUTPATIENT)
Dept: ELECTROPHYSIOLOGY | Facility: CLINIC | Age: 54
End: 2024-01-31
Payer: COMMERCIAL

## 2024-01-31 PROCEDURE — 93296 REM INTERROG EVL PM/IDS: CPT

## 2024-01-31 PROCEDURE — 93295 DEV INTERROG REMOTE 1/2/MLT: CPT

## 2024-02-01 NOTE — ED ADULT TRIAGE NOTE - HEIGHT IN CM
Aishwarya Le [[id number]]      Labor Events     Labor: No   Steroids: None  Cervical Ripening Date/Time:        Rupture Date/Time:  24 09:39:00   Rupture Type: AROM  Fluid Color: Clear  Fluid Odor: None  Fluid Volume: Moderate              Anesthesia    Method: Spinal       Labor Length    3rd stage: 0h 02m       Delivery Details      Delivery Date: 24 Delivery Time: 09:40:00   Delivery Type: , Low Transverse  Trial of Labor?: No   Categorization: Repeat   Priority: scheduled  Indications for : Prior Uterine Surgery       Skin Incision Type: Low Transverse  Uterine Incision: Low Transverse       [place] Presentation    Presentation: Vertex       Shoulder Dystocia    Shoulder Dystocia Present?: No       Assisted Delivery Details    Forceps Attempted?: No  Vacuum Extractor Attempted?: No                           Cord    Vessels: 3 Vessels  Complications: Nuchal Loose  Cord Around: Head  Delayed Cord Clamping?: Yes  Cord Clamped Date/Time: 2024 09:41:00  Cord Blood Disposition: Lab  Gases Sent?: No              Placenta    Date/Time: 2024 09:42:00  Removal: Manual Removal  Appearance: Intact  Disposition: Discarded       Lacerations           Blood Loss  Mother: Ariella Le #[id number]     Start of Mother's Information      Delivery Blood Loss  24 0908 - 24 1145      Quantitative Blood Loss (mL) Hospital Encounter 950 grams    Total  950 mL               End of Mother's Information  Mother: Ariella Le #[id number]                Delivery Providers    Delivering clinician: Nawaf Thomas DO     Provider Role    Nawaf Thomas DO Obstetrician    Na Gomes RN Primary Nurse    June Mcmahon APRN - CRNA Nurse Anesthetist    Jane Kenyon RN Nursery Nurse    Elie Be MD Obstetrician    Dione Garzon Scrub Tech    Margret Clarke RN Charge Nurse              [place]  Assessment    Living Status: Living        Skin Color:   Heart Rate:   Reflex Irritability:   Muscle Tone:   Respiratory Effort:   Total:            1 Minute:    0    2    2    2    2    8         5 Minute:    1    2    2    2    2    9                                        Apgars Assigned By: ORESTES SCANLON RN              Resuscitation    Method: Stimulation, Suctioning, CPAP             [place] Measurements      Birth Weight: 3870 g   Birth Length: 47.6 cm     Head Circumference: 34 cm     Chest Circumference: 33.5 cm     Abdominal Girth: 34 cm              Skin to Skin      Reason Skin to Skin Not Initiated:  Acuity     Breastfeeding Initiated Date/Time: 2024 10:45:00                   177.8

## 2024-02-22 ENCOUNTER — LABORATORY RESULT (OUTPATIENT)
Age: 54
End: 2024-02-22

## 2024-03-01 ENCOUNTER — NON-APPOINTMENT (OUTPATIENT)
Age: 54
End: 2024-03-01

## 2024-03-11 ENCOUNTER — RESULT REVIEW (OUTPATIENT)
Age: 54
End: 2024-03-11

## 2024-03-11 ENCOUNTER — EMERGENCY (EMERGENCY)
Facility: HOSPITAL | Age: 54
LOS: 1 days | Discharge: HOME | End: 2024-03-11
Attending: EMERGENCY MEDICINE
Payer: COMMERCIAL

## 2024-03-11 VITALS
HEIGHT: 72 IN | DIASTOLIC BLOOD PRESSURE: 103 MMHG | HEART RATE: 59 BPM | WEIGHT: 200.62 LBS | OXYGEN SATURATION: 98 % | RESPIRATION RATE: 20 BRPM | TEMPERATURE: 98 F | SYSTOLIC BLOOD PRESSURE: 167 MMHG

## 2024-03-11 DIAGNOSIS — I10 ESSENTIAL (PRIMARY) HYPERTENSION: ICD-10-CM

## 2024-03-11 DIAGNOSIS — Z95.810 PRESENCE OF AUTOMATIC (IMPLANTABLE) CARDIAC DEFIBRILLATOR: Chronic | ICD-10-CM

## 2024-03-11 DIAGNOSIS — I25.10 ATHEROSCLEROTIC HEART DISEASE OF NATIVE CORONARY ARTERY WITHOUT ANGINA PECTORIS: ICD-10-CM

## 2024-03-11 DIAGNOSIS — I51.3 INTRACARDIAC THROMBOSIS, NOT ELSEWHERE CLASSIFIED: ICD-10-CM

## 2024-03-11 LAB
ALBUMIN SERPL ELPH-MCNC: 4.5 G/DL — SIGNIFICANT CHANGE UP (ref 3.3–5.2)
ALP SERPL-CCNC: 57 U/L — SIGNIFICANT CHANGE UP (ref 40–120)
ALT FLD-CCNC: 22 U/L — SIGNIFICANT CHANGE UP
ANION GAP SERPL CALC-SCNC: 13 MMOL/L — SIGNIFICANT CHANGE UP (ref 5–17)
AST SERPL-CCNC: 26 U/L — SIGNIFICANT CHANGE UP
BILIRUB SERPL-MCNC: 0.6 MG/DL — SIGNIFICANT CHANGE UP (ref 0.4–2)
BUN SERPL-MCNC: 14.6 MG/DL — SIGNIFICANT CHANGE UP (ref 8–20)
CALCIUM SERPL-MCNC: 9.2 MG/DL — SIGNIFICANT CHANGE UP (ref 8.4–10.5)
CHLORIDE SERPL-SCNC: 99 MMOL/L — SIGNIFICANT CHANGE UP (ref 96–108)
CO2 SERPL-SCNC: 26 MMOL/L — SIGNIFICANT CHANGE UP (ref 22–29)
CREAT SERPL-MCNC: 0.86 MG/DL — SIGNIFICANT CHANGE UP (ref 0.5–1.3)
EGFR: 103 ML/MIN/1.73M2 — SIGNIFICANT CHANGE UP
GLUCOSE SERPL-MCNC: 120 MG/DL — HIGH (ref 70–99)
HCT VFR BLD CALC: 48.8 % — SIGNIFICANT CHANGE UP (ref 39–50)
HGB BLD-MCNC: 16.1 G/DL — SIGNIFICANT CHANGE UP (ref 13–17)
INR BLD: 1.49 RATIO — HIGH (ref 0.85–1.18)
MCHC RBC-ENTMCNC: 30.7 PG — SIGNIFICANT CHANGE UP (ref 27–34)
MCHC RBC-ENTMCNC: 33 GM/DL — SIGNIFICANT CHANGE UP (ref 32–36)
MCV RBC AUTO: 93 FL — SIGNIFICANT CHANGE UP (ref 80–100)
NT-PROBNP SERPL-SCNC: 167 PG/ML — SIGNIFICANT CHANGE UP (ref 0–300)
PLATELET # BLD AUTO: 205 K/UL — SIGNIFICANT CHANGE UP (ref 150–400)
POTASSIUM SERPL-MCNC: 4.3 MMOL/L — SIGNIFICANT CHANGE UP (ref 3.5–5.3)
POTASSIUM SERPL-SCNC: 4.3 MMOL/L — SIGNIFICANT CHANGE UP (ref 3.5–5.3)
PROT SERPL-MCNC: 7.3 G/DL — SIGNIFICANT CHANGE UP (ref 6.6–8.7)
PROTHROM AB SERPL-ACNC: 16.4 SEC — HIGH (ref 9.5–13)
RBC # BLD: 5.25 M/UL — SIGNIFICANT CHANGE UP (ref 4.2–5.8)
RBC # FLD: 12 % — SIGNIFICANT CHANGE UP (ref 10.3–14.5)
SODIUM SERPL-SCNC: 138 MMOL/L — SIGNIFICANT CHANGE UP (ref 135–145)
TROPONIN T, HIGH SENSITIVITY RESULT: 10 NG/L — SIGNIFICANT CHANGE UP (ref 0–51)
TROPONIN T, HIGH SENSITIVITY RESULT: 10 NG/L — SIGNIFICANT CHANGE UP (ref 0–51)
WBC # BLD: 8.27 K/UL — SIGNIFICANT CHANGE UP (ref 3.8–10.5)
WBC # FLD AUTO: 8.27 K/UL — SIGNIFICANT CHANGE UP (ref 3.8–10.5)

## 2024-03-11 PROCEDURE — 71045 X-RAY EXAM CHEST 1 VIEW: CPT | Mod: 26

## 2024-03-11 PROCEDURE — 99284 EMERGENCY DEPT VISIT MOD MDM: CPT

## 2024-03-11 PROCEDURE — 93010 ELECTROCARDIOGRAM REPORT: CPT

## 2024-03-11 PROCEDURE — 93306 TTE W/DOPPLER COMPLETE: CPT | Mod: 26

## 2024-03-11 PROCEDURE — 99223 1ST HOSP IP/OBS HIGH 75: CPT | Mod: 25

## 2024-03-11 PROCEDURE — 93282 PRGRMG EVAL IMPLANTABLE DFB: CPT | Mod: 26

## 2024-03-11 RX ORDER — PANTOPRAZOLE SODIUM 20 MG/1
1 TABLET, DELAYED RELEASE ORAL
Refills: 0 | DISCHARGE

## 2024-03-11 RX ORDER — ONDANSETRON 8 MG/1
4 TABLET, FILM COATED ORAL ONCE
Refills: 0 | Status: COMPLETED | OUTPATIENT
Start: 2024-03-11 | End: 2024-03-11

## 2024-03-11 RX ORDER — CLOPIDOGREL BISULFATE 75 MG/1
75 TABLET, FILM COATED ORAL DAILY
Refills: 0 | Status: DISCONTINUED | OUTPATIENT
Start: 2024-03-12 | End: 2024-03-18

## 2024-03-11 RX ORDER — POLYETHYLENE GLYCOL 3350 17 G/17G
17 POWDER, FOR SOLUTION ORAL ONCE
Refills: 0 | Status: COMPLETED | OUTPATIENT
Start: 2024-03-11 | End: 2024-03-11

## 2024-03-11 RX ORDER — ISOSORBIDE MONONITRATE 60 MG/1
60 TABLET, EXTENDED RELEASE ORAL DAILY
Refills: 0 | Status: DISCONTINUED | OUTPATIENT
Start: 2024-03-12 | End: 2024-03-18

## 2024-03-11 RX ORDER — ENOXAPARIN SODIUM 100 MG/ML
90 INJECTION SUBCUTANEOUS ONCE
Refills: 0 | Status: COMPLETED | OUTPATIENT
Start: 2024-03-11 | End: 2024-03-11

## 2024-03-11 RX ORDER — ASPIRIN/CALCIUM CARB/MAGNESIUM 324 MG
325 TABLET ORAL ONCE
Refills: 0 | Status: DISCONTINUED | OUTPATIENT
Start: 2024-03-11 | End: 2024-03-11

## 2024-03-11 RX ORDER — AMLODIPINE BESYLATE 2.5 MG/1
10 TABLET ORAL ONCE
Refills: 0 | Status: COMPLETED | OUTPATIENT
Start: 2024-03-11 | End: 2024-03-11

## 2024-03-11 RX ORDER — WARFARIN SODIUM 2.5 MG/1
3 TABLET ORAL DAILY
Refills: 0 | Status: DISCONTINUED | OUTPATIENT
Start: 2024-03-11 | End: 2024-03-12

## 2024-03-11 RX ORDER — WARFARIN SODIUM 2.5 MG/1
1 TABLET ORAL
Qty: 0 | Refills: 0 | DISCHARGE

## 2024-03-11 RX ORDER — ISOSORBIDE MONONITRATE 60 MG/1
1 TABLET, EXTENDED RELEASE ORAL
Qty: 0 | Refills: 3 | DISCHARGE

## 2024-03-11 RX ORDER — RANOLAZINE 500 MG/1
1000 TABLET, FILM COATED, EXTENDED RELEASE ORAL
Refills: 0 | Status: DISCONTINUED | OUTPATIENT
Start: 2024-03-11 | End: 2024-03-18

## 2024-03-11 RX ORDER — LOSARTAN POTASSIUM 100 MG/1
1 TABLET, FILM COATED ORAL
Refills: 0 | DISCHARGE

## 2024-03-11 RX ORDER — ATORVASTATIN CALCIUM 80 MG/1
80 TABLET, FILM COATED ORAL AT BEDTIME
Refills: 0 | Status: DISCONTINUED | OUTPATIENT
Start: 2024-03-11 | End: 2024-03-18

## 2024-03-11 RX ORDER — SODIUM CHLORIDE 9 MG/ML
1000 INJECTION INTRAMUSCULAR; INTRAVENOUS; SUBCUTANEOUS
Refills: 0 | Status: DISCONTINUED | OUTPATIENT
Start: 2024-03-11 | End: 2024-03-11

## 2024-03-11 RX ORDER — GABAPENTIN 400 MG/1
0 CAPSULE ORAL
Qty: 0 | Refills: 0 | DISCHARGE

## 2024-03-11 RX ORDER — PANTOPRAZOLE SODIUM 20 MG/1
40 TABLET, DELAYED RELEASE ORAL
Refills: 0 | Status: DISCONTINUED | OUTPATIENT
Start: 2024-03-11 | End: 2024-03-18

## 2024-03-11 RX ORDER — LOSARTAN POTASSIUM 100 MG/1
50 TABLET, FILM COATED ORAL DAILY
Refills: 0 | Status: DISCONTINUED | OUTPATIENT
Start: 2024-03-11 | End: 2024-03-18

## 2024-03-11 RX ORDER — SPIRONOLACTONE 25 MG/1
25 TABLET, FILM COATED ORAL DAILY
Refills: 0 | Status: DISCONTINUED | OUTPATIENT
Start: 2024-03-12 | End: 2024-03-18

## 2024-03-11 RX ORDER — TAMSULOSIN HYDROCHLORIDE 0.4 MG/1
0.4 CAPSULE ORAL AT BEDTIME
Refills: 0 | Status: DISCONTINUED | OUTPATIENT
Start: 2024-03-11 | End: 2024-03-18

## 2024-03-11 RX ORDER — GABAPENTIN 400 MG/1
100 CAPSULE ORAL AT BEDTIME
Refills: 0 | Status: DISCONTINUED | OUTPATIENT
Start: 2024-03-11 | End: 2024-03-18

## 2024-03-11 RX ORDER — CARVEDILOL PHOSPHATE 80 MG/1
12.5 CAPSULE, EXTENDED RELEASE ORAL EVERY 12 HOURS
Refills: 0 | Status: DISCONTINUED | OUTPATIENT
Start: 2024-03-11 | End: 2024-03-18

## 2024-03-11 RX ORDER — LISINOPRIL 2.5 MG/1
1 TABLET ORAL
Qty: 0 | Refills: 0 | DISCHARGE

## 2024-03-11 RX ORDER — ISOSORBIDE MONONITRATE 60 MG/1
30 TABLET, EXTENDED RELEASE ORAL ONCE
Refills: 0 | Status: COMPLETED | OUTPATIENT
Start: 2024-03-11 | End: 2024-03-11

## 2024-03-11 RX ORDER — TAMSULOSIN HYDROCHLORIDE 0.4 MG/1
1 CAPSULE ORAL
Refills: 0 | DISCHARGE

## 2024-03-11 RX ADMIN — ATORVASTATIN CALCIUM 80 MILLIGRAM(S): 80 TABLET, FILM COATED ORAL at 21:37

## 2024-03-11 RX ADMIN — RANOLAZINE 1000 MILLIGRAM(S): 500 TABLET, FILM COATED, EXTENDED RELEASE ORAL at 18:49

## 2024-03-11 RX ADMIN — ONDANSETRON 4 MILLIGRAM(S): 8 TABLET, FILM COATED ORAL at 12:47

## 2024-03-11 RX ADMIN — WARFARIN SODIUM 3 MILLIGRAM(S): 2.5 TABLET ORAL at 21:37

## 2024-03-11 RX ADMIN — AMLODIPINE BESYLATE 10 MILLIGRAM(S): 2.5 TABLET ORAL at 12:47

## 2024-03-11 RX ADMIN — ENOXAPARIN SODIUM 90 MILLIGRAM(S): 100 INJECTION SUBCUTANEOUS at 18:27

## 2024-03-11 RX ADMIN — TAMSULOSIN HYDROCHLORIDE 0.4 MILLIGRAM(S): 0.4 CAPSULE ORAL at 21:37

## 2024-03-11 RX ADMIN — POLYETHYLENE GLYCOL 3350 17 GRAM(S): 17 POWDER, FOR SOLUTION ORAL at 12:46

## 2024-03-11 RX ADMIN — GABAPENTIN 100 MILLIGRAM(S): 400 CAPSULE ORAL at 21:37

## 2024-03-11 NOTE — ED CDU PROVIDER INITIAL DAY NOTE - OBJECTIVE STATEMENT
52yo M former smoker, PMHx HTN, HLD, CAD w/ multivessel stenting, MI w/ restenosis of RCA and LAD (2017), Last procedure 4/22 found to have severe ISR of the mid and distal LAD s/p unsuccessful ballooning -> Brachytherapy to the prox half of the stent as cath did not cross the distal vessel,  LV apical thrombus (coumadin), HFrEF (EF 40-45%), syncope with 2:1 heart block s/p Medtronic AICD 5/2018, CVA presents to ED for evaluation of chest pain.  Patient states last night after taking his medications he started to feel "crushing" chest pain, diaphoresis and dizziness.  States he took his blood pressure and it was very elevated 180s/130s.  States chest pain gradually improved to the point that he was able to sleep.  States this morning upon waking had a recurrence of symptoms which brought him to ED.  Patient is now chest pain-free and blood pressure is improved.  Initial troponin 10, pending repeat.  Patient evaluated by [facility] cardiology who recommends uptitrating antihypertensives and obtaining TTE.  Patient denies SOB, headache, visual changes, back pain, abdominal pain, vomiting, LE edema.

## 2024-03-11 NOTE — ED CDU PROVIDER INITIAL DAY NOTE - NS ED ROS FT
+Chest pain (resolved), +Dizziness (resolved), +Diaphoresis (resolved), +HTN  all other ROS negative

## 2024-03-11 NOTE — ED PROVIDER NOTE - NSICDXPASTSURGICALHX_GEN_ALL_CORE_FT
PAST SURGICAL HISTORY:  Cardiac defibrillator in place 2017  Medtronic Visa AF MRI VR Surescan ICD   Model# [id number], SN# [id number]

## 2024-03-11 NOTE — ED ADULT NURSE NOTE - NSICDXPASTMEDICALHX_GEN_ALL_CORE_FT
PAST MEDICAL HISTORY:  CAD (coronary artery disease)     Former smoker     Heart block 2:1 HB   dual chamber AICD (DDD ) in May 2018 [facility] (Dr. Crook [place])    HTN (hypertension)     MI (myocardial infarction) 7 coronary stents    Status post CVA residual rt. lower ext weakness.    Stented coronary artery     Thrombosis apical

## 2024-03-11 NOTE — CONSULT NOTE ADULT - SUBJECTIVE AND OBJECTIVE BOX
[facility]                                              CARDIOLOGY AT [address], [place] [address]                                             Telephone: [phone]. Fax:[phone]                                                         CARDIOLOGY CONSULTATION NOTE                                                                                             Consult requested by:  Dr Griffin  History obtained by: Patient and medical record  Community Cardiologist:  Dr. Rush   obtained: Yes [  ] No [x  ]  Reason for Consultation:  Chest pain/sob  Avialable out pt records reviewed: Yes [ x ] No [  ]    53 year old male with PMH of former smoker, HTN, HLD, CAD w/ multivessel stenting, MI w/ restenosis of RCA and LAD (2017), Last procedure 4/22 found to have severe isr of the mid and distal LAD s/p unsuccessful ballooning -> Brachytherapy to the prox half of the stent as cath did not cross the distal vessel,  LV apical thrombus (coumadin) HFrEF (EF 40-45%), syncope with 2:1 heart block s/p Medtronic AICD 5/2018, CVA, presents to the ER with elevation in b/p for the last 2 days, feeling lightheaded and this am chest pain with rest.  Arrived in ER and found to have a b/p of 167/103  Chest pain free on arrival  Has been having some sob with exertion    CARDIAC TESTING   ECHO:  e< from: TTE Echo Complete w/ Contrast w/ Doppler (02.26.22 @ 14:04) >  Summary:   1. Normal left ventricular internal cavity size.   2. There is moderate concentric left ventricular hypertrophy.   3. Entire apex, basal inferoseptal segment, and basal inferior segment   are abnormal as described above.   4. Ischemic cardiomyopathy.   5. Mildly decreased global left ventricular systolic function.   6. Left ventricular ejection fraction, by visual estimation, is 40 to   45%.   7. Normal right ventricular size and function.   8. Mild thickening of the anterior and posterior mitral valve leaflets.   9. Trace mitral valve regurgitation.  10. There is no evidence of pericardial effusion.  11. Endocardial visualization was enhanced with intravenous echo contrast.    CATH: < from: Cardiac Catheterization (04.13.22 @ 14:16) >  Severe ISR of mid and distal LAD. Very small caliber vessel with  underexpanded stent. Despite aggressive ballooning, unable  to fully expand the stents. Laser atherectomy was deferred due to risk  for vascular injury .  Brachytherapy was performed to the proximal half of the stent as  catheter did not cross to the distal vessel.    Cardiac Catheterization (04.13.22 @ 14:16) >  Coronary Angiography   LM   Left main artery: Angiography shows minor irregularities.      LAD   Left anterior descending artery: Angiography shows mild  atherosclerosis. Mid left anterior descending: There is a 90 %  stenosis. LOLIS Flow 3. Distal left anterior descending: There is a 90  % stenosis. LOLIS Flow 3.    Cardiac Catheterization (02.28.22 @ 08:34) >  VENTRICLES: No LV gram was performed; however, a recent echocardiogram  demonstrated an EF of 45 %.  CORONARY VESSELS: The coronary circulation is right dominant.  LM:   --  LM: Normal.  LAD:   --  Mid LAD: There was a diffuse 90 % stenosis at the site of a  prior stent, in-stent. There was LOLIS grade 3 flow through the vessel  (brisk flow).  --  Distal LAD: There was a diffuse 80 % stenosis in-stent.  --  D1: Normal.  CX:   --  Circumflex: The vessel was medium to large sized.  --  Mid circumflex: There was a diffuse 10 % stenosis at the site of a  prior stent.  RCA:   --  RCA: The vessel arose abnormally high from the right sinus of  Valsalva.  --  Mid RCA: There was a diffuse 20 % stenosis at the site of a prior    PAST MEDICAL HISTORY  CAD  HTN  HLD  Heart block  Former smoker  Lv thrombus on coumadin  CVA    PAST SURGICAL HISTORY  ICD    SOCIAL HISTORY:  Denies smoking/alcohol/drugs    FAMILY HISTORY:  Family History of Cardiovascular Disease:  Yes [x  ] No [  ]  Coronary Artery Disease in first degree relative: Yes [x  ] No [  ]  Sudden Cardiac Death in First degree relative: Yes [ x ] No [  ]    HOME MEDICATIONS:  Atorvastatin 80mg qd  Coreg 12.5 bid  Plavix 75mg qd  Gabapentin 100mg bid  imdur 30mg qd  Losartan 25mg qd  NTG sl prn  Ranexa 1g bid  Spirolactone 25mg qd  Warfarin 3mg qd    ALLERGIES: Allergies    REVIEW OF SYMPTOMS:   CONSTITUTIONAL: No fever, no chills, no weight loss, no weight gain, no fatigue   ENMT:  No vertigo; No sinus or throat pain  NECK: No pain or stiffness  CARDIOVASCULAR: see hpi  RESPIRATORY: no Shortness of breath, no cough, no wheezing  : No dysuria, no hematuria   GI: No dark color stool, no nausea, no diarrhea, no constipation, no abdominal pain   NEURO: No headache, no slurred speech   MUSCULOSKELETAL: No joint pain or swelling; No muscle, back, or extremity pain  PSYCH: No agitation, no anxiety.    ALL OTHER REVIEW OF SYSTEMS ARE NEGATIVE.      Vital Signs Last 24 Hrs  T(C): 36.7 (11 Mar 2024 11:21), Max: 36.7 (11 Mar 2024 11:21)  T(F): 98 (11 Mar 2024 11:21), Max: 98 (11 Mar 2024 11:21)  HR: 68 (11 Mar 2024 14:05) (59 - 68)  BP: 168/89 (11 Mar 2024 14:05) (152/92 - 168/89)  BP(mean): --  RR: 18 (11 Mar 2024 14:05) (18 - 20)  SpO2: 98% (11 Mar 2024 14:05) (98% - 98%)    Parameters below as of 11 Mar 2024 14:05  Patient On (Oxygen Delivery Method): room air      PHYSICAL EXAM:  Constitutional: Comfortable . No acute distress.   HEENT: Atraumatic and normocephalic , neck is supple . no JVD. No carotid bruit.  CNS: A&Ox3. No focal deficits.   Respiratory: CTAB, unlabored   Cardiovascular: RRR normal s1 s2. No murmur. No rubs or gallop.  Gastrointestinal: Soft, non-tender. +Bowel sounds.   MSK: full ROM extremities x 4  Extremities: No edema. No cyanosis   Psychiatric: Calm . no agitation.   Skin: Warm and dry, no ulcers on extremities       LABS:                        16.1   8.27  )-----------( 205      ( 11 Mar 2024 12:30 )             48.8     03-11    138  |  99  |  14.6  ----------------------------<  120<H>  4.3   |  26.0  |  0.86    Ca    9.2      11 Mar 2024 12:30    TPro  7.3  /  Alb  4.5  /  TBili  0.6  /  DBili  x   /  AST  26  /  ALT  22  /  AlkPhos  57  03-11    PT/INR - ( 11 Mar 2024 12:30 )   PT: 16.4 sec;   INR: 1.49 ratio      Urinalysis Basic - ( 11 Mar 2024 12:30 )  Color: x / Appearance: x / SG: x / pH: x  Gluc: 120 mg/dL / Ketone: x  / Bili: x / Urobili: x   Blood: x / Protein: x / Nitrite: x   Leuk Esterase: x / RBC: x / WBC x   Sq Epi: x / Non Sq Epi: x / Bacteria: x    ECG: NSR wavy baseline  q inf leads  prwp v3-v4  t wave inversion v5-v6  Unchanged from previous ekgs  Prior ECG: Yes [  ] No [  ]    RADIOLOGY & ADDITIONAL STUDIES:    X-ray:  reviewed by me. Poor inspiratory film  no chf

## 2024-03-11 NOTE — ED CDU PROVIDER INITIAL DAY NOTE - ATTENDING APP SHARED VISIT CONTRIBUTION OF CARE
I, Luis Culver, performed a face to face bedside interview with this patient regarding history of present illness, and completed an independent physical examination. I personally made/approved the management plan and take responsibility for the patient management. I have communicated the patient’s plan of care and disposition with the ACP.  53 year old male with PMH HTN, HLD, CAD w/ multivessel stenting, MI w/ restenosis of RCA and LAD (2017), Last procedure 4/22 found to have severe isr of the mid and distal LAD s/p unsuccessful ballooning -> Brachytherapy to the prox half of the stent as cath did not cross the distal vessel,  LV apical thrombus (coumadin) HFrEF (EF 40-45%), syncope with 2:1 heart block s/p Medtronic AICD 5/2018, CVA presents with chest pain. Pt reports episodes of pain last night and again this am, crushing in nature., Currently chest pain free  Gen: NAD, well appearing  CV: RRR  Pul: CTA b/l  Abd: Soft, non-distended, non-tender  Neuro: no focal deficits  EKG without acute changes, currently pain free, seen by cardio who advise echo, uptitrating bp meds and monitoring

## 2024-03-11 NOTE — ED ADULT NURSE REASSESSMENT NOTE - NS ED NURSE REASSESS COMMENT FT1
received pt from echo, placed pt on CM & , pt is NSR on telemetry and oxygen saturation >95% on room air. pt is resting comfortably in stretcher, talking on the phone. pt given a turkey sandwich & ginger ale. respirations even and unlabored. pt shows no s/s of acute distress at this time. safety precautions maintained. pt updated on plan of care.

## 2024-03-11 NOTE — ED CDU PROVIDER INITIAL DAY NOTE - CLINICAL SUMMARY MEDICAL DECISION MAKING FREE TEXT BOX
52yo M with extensive cardiac history presented to ED for evaluation of chest pain, elevated BP. Patient is now chest pain-free and blood pressure is improved.  Initial troponin 10, pending repeat.  Patient evaluated by [facility] cardiology who recommends uptitrating antihypertensives and obtaining TTE.

## 2024-03-11 NOTE — ED ADULT TRIAGE NOTE - CHIEF COMPLAINT QUOTE
Pt arrives to ED c/o chest pain / dizziness /  generalized weakness- Hypertensive yesterday at home. Hx of cardiac stents / defib

## 2024-03-11 NOTE — CONSULT NOTE ADULT - PROBLEM SELECTOR RECOMMENDATION 2
c/w Plavix  On coreg 12.5 bid on arrival adequately beta blocked  increase Imdur to 60mg qd  c/w Lipitor 80mg qd  increase losartan to 50mg qd  c/w Ranexa 1gm bid  Limited echo to evaluate LV function and wall motion  Trend trop c/w Plavix  On coreg 12.5 bid on arrival adequately beta blocked  increase Imdur to 60mg qd  c/w Lipitor 80mg qd  increase losartan to 50mg qd  c/w Ranexa 1gm bid  Limited echo to evaluate LV function and wall motion  Trend trop  check lipid panel  check hgb aic

## 2024-03-11 NOTE — PROCEDURE NOTE - INTERROGATION NOTE: R-WAVE AMPLITUDE (MV)
11.3 I personally saw the patient with the NP, and completed the key components of the history and physical exam. I then discussed the management plan with the NP.

## 2024-03-11 NOTE — PROCEDURE NOTE - ADDITIONAL PROCEDURE DETAILS
Battery longevity 5.2 years  No events or observations observed  Device functioning normally.    RV lead: [id number] [employer] Quattro MRI ([employer]).  Device is MRI compatible. Please call EP if an MRI is needed.

## 2024-03-11 NOTE — ED PROVIDER NOTE - ATTENDING CONTRIBUTION TO CARE
I, Valentin Griffin, performed the initial face to face bedside interview with this patient regarding history of present illness, review of symptoms and relevant past medical, social and family history.  I completed an independent physical examination.  I was the initial provider who evaluated this patient. I have signed out the follow up of any pending tests (i.e. labs, radiological studies) to the resident.  I have communicated the patient’s plan of care and disposition with the resident

## 2024-03-11 NOTE — ED PROVIDER NOTE - CLINICAL SUMMARY MEDICAL DECISION MAKING FREE TEXT BOX
Assessment: 53F with PMHx of CAD w/ multivessel stenting, MI w/ restenosis of RCA and LAD (2017), LV apical thrombus, HFrEF (EF 40-45%), syncope w/ 2:1 heart block s/p AICD (2018), CVA, recurrent LV thrombus s/p LHC (2022) presented to ED due to crushing chest pain for the past 2 days and hypertensive emergency.     Plan:    - EKG  - Cardiac monitor  - Pro BNP, CBC, CMP  - PT/INR  - Troponin  - CXR  - Aspirin 325 mg PO x1  - Atorvastatin 80 mg PO qhs  - Amlodipine 10 mg x1  - Miralax  - Zofran Assessment: 53F with PMHx of CAD w/ multivessel stenting, MI w/ restenosis of RCA and LAD (2017), LV apical thrombus, HFrEF (EF 40-45%), syncope w/ 2:1 heart block s/p AICD (2018), CVA, recurrent LV thrombus s/p LHC (2022) presented to ED due to crushing chest pain for the past 2 days and hypertensive emergency.     Plan:    - Cardiology consult  - EKG  - Cardiac monitor  - Pro BNP, CBC, CMP  - PT/INR  - Troponin  - CXR  - Aspirin 325 mg PO x1  - Atorvastatin 80 mg PO qhs  - Amlodipine 10 mg x1  - Miralax  - Zofran

## 2024-03-11 NOTE — CONSULT NOTE ADULT - PROBLEM SELECTOR RECOMMENDATION 9
Due to co morbidities of recurrent heart disease we need to keep his b/p  around 110/70  patient states he has been compliant with meds  will increase imdur to 60mg qd  increase losartan to 50mg qd  if b/p still elevated tomorrow increase spirolactone to bid Due to co morbidities of recurrent heart disease we need to keep his b/p  around 110/70  patient states he has been compliant with meds  will increase Imdur to 60mg qd  increase losartan to 50mg qd  if b/p still elevated tomorrow increase spirolactone to bid

## 2024-03-11 NOTE — CONSULT NOTE ADULT - PROBLEM SELECTOR RECOMMENDATION 3
continue coumadin  one dose of lovenex tonight for low inr continue coumadin  one dose of Lovenox tonight for low inr continue coumadin  No immediate plans for cath  one dose of Lovenox tonight for low inr

## 2024-03-11 NOTE — ED ADULT NURSE REASSESSMENT NOTE - NS ED NURSE REASSESS COMMENT FT1
Received report and assumed pt care at 1625.  pt is alert and oriented X 4 offering no complaints.  Comfortable and requesting food.  Cardiac monitor showing NSR with stable blood pressure.  Plan of care explained and pt acknowledged understanding.

## 2024-03-11 NOTE — ED CDU PROVIDER INITIAL DAY NOTE - NSICDXPASTSURGICALHX_GEN_ALL_CORE_FT
PAST SURGICAL HISTORY:  Cardiac defibrillator in place 2017  Medtronic Visa AF MRI VR Surescan ICD   Model# [id number], SN# [id number]

## 2024-03-11 NOTE — ED ADULT NURSE NOTE - OBJECTIVE STATEMENT
Pt to ED c/o epigastric pain radiating to chest with dizziness and hypertension. Pt denies n/v, cough, sob. Pt A&Ox4 in NAD @ this time. RR even & unlabored. Pt NSR on cardiac monitor. Hx CAD w/ stents & ICD. Pt to ED c/o midsternal cp radiating to R shoulder with dizziness, nausea, and hypertension. Pt denies vomiting, cough, sob. Pt A&Ox4 in NAD @ this time. RR even & unlabored. Pt NSR on cardiac monitor. Hx CAD w/ stents, ICD, CVA.

## 2024-03-11 NOTE — ED ADULT NURSE NOTE - NSICDXPASTSURGICALHX_GEN_ALL_CORE_FT
PAST SURGICAL HISTORY:  Cardiac defibrillator in place 2017  Medtronic Visa AF MRI VR Surescan ICD   Model# [id number], SN# [id number]

## 2024-03-11 NOTE — ED CDU PROVIDER INITIAL DAY NOTE - PHYSICAL EXAMINATION
Gen: No acute distress, non toxic  Head: NCAT  Eyes: pink conjunctivae, EOMI, PERRL  CV: RRR, nl s1/s2.  Resp: CTAB, normal rate and effort  Neuro: A&O x 3, sensorimotor intact without deficits   MSK: No spine or joint tenderness to palpation, Full ROM ext x 4  Skin: No rashes. intact and perfused.

## 2024-03-11 NOTE — ED PROVIDER NOTE - OBJECTIVE STATEMENT
53M with PMHx of CAD w/ multivessel stenting, MI w/ restenosis of RCA and LAD (2017), LV apical thrombus, HFrEF (EF 40-45%), syncope w/ 2:1 heart block s/p AICD (2018), CVA, recurrent LV thrombus s/p LHC (2022) presented due to chest pain, nausea, weakness, and dizziness for the past 2 days and BP of 184/130 yesterday. Reports crushing pain in the center of his chest that radiates to the right shoulder. Pain was 8/10 yesterday and is now 6/10. Patient was diaphoretic yesterday and noted elevated BP so took 10 mg of losartan.

## 2024-03-11 NOTE — ED ADULT NURSE REASSESSMENT NOTE - NS ED NURSE REASSESS COMMENT FT1
Pt laying in bed, resting. Pt A&O x4 in NAD @ this time. RR even & unlabored. Pt sinus bulmaro on cardiac monitor. Pt awaiting results. Safety maintained.

## 2024-03-11 NOTE — ED ADULT NURSE REASSESSMENT NOTE - NS ED NURSE REASSESS COMMENT FT1
Pt laying in bed, resting. Pt A&Ox4 in NAD @ thi time. RR even & unlabored. Pt NSR on cardiac monitor. VS as documented. Pt in OBS, awaiting Echo. Safety maintained.     Report given to LATONIA Valdes. Pt taken to A6L.

## 2024-03-11 NOTE — CONSULT NOTE ADULT - NS ATTEND AMEND GEN_ALL_CORE FT
53M Creole-speaking man with history of CAD with multivessel stenting and MI (2017 at [facility] with restenosis of RCA and LAD s/p PEPE to mRCA), LV apical thrombus (previously been on warfarin since 2017 discontinued on 2020), HFrEF (35-40% in 2018), syncope with 2:1 heart block s/p Medtronic AICD 5/2018 (DDD ), CVA (6/2019) and with unstable angina in 11/2019 s/p PCI/stent to mLCx at [facility], previously follows at [facility] with Dr. Crook but since left practice without follow up, presented to [facility] on 9/2/20 with unstable angina, serial Troponin negative, repeat TTE with EF 40-45% (upon my own review) and resolved LV apical thrombus (known since 11/2019 but unclear reason remains on warfarin and self discontinued clopidogrel 75mg since 7/2019), underwent LHC found with patent stents in LCx, RCA and moderate-severe instent restenosis of [id number] LAD, given small caliber size of the vessel decided medical management with antianginal (increased Imdur to 120mg), presented for initial outpatient cardiology evaluation on 9/15/20, added Ranexa to Imdur for severe angina, still with refractory symptoms and he presented again to [facility] on 9/23/20, underwent PCI/PEPE x2 to dLAD for 95% severe instent restenosis, repeat TTE off warfarin found with apical akinesis/aneurysm with calcified mural thrombus, LV EF 40-45%, had 21 beats WCT, increased carvedilol dose to 25mg BID, discharged home on 9/24/20, prior visit 5/2021 after returned back from [facility] with borderline SBP reduced carvedilol dose to 12.5mg, last visit 11/2021 interval with recurrent CVAs had repeat TTE with Definity confirmed recurrence of LV apical thrombus, restarted back on warfarin, last seen in office 1/2022, interval readmitted to [facility] 2/28/22 for chest pain, CTA done with probable focal dissection at the distal abdominal aorta, repeat LHC shown extensive instent restenosis in mLAD with prior 2 layers of stent, patent RCA and LCx stents, patient referred for brachytherapy at [facility] with Dr. Hannah Cheatham found 90% ISR at mLAD unable to deliver catheter to the distal segment with partial improvement of proximal half of the vessel (80% mLAD), last visit 4/2022 still with recurrent stable anginal symptoms, interval followed with EPS/Dr. Barfield had 1 week Holter done with 6% PVCs, last seen 11/2022, interval followed with EP pending repeat MCOT on future follow up if PVC burden >10% the required intervention, last office visit 4/2023 overall stable and therapeutic INR, interval been to [facility] on 5/24/23 for sudden fatigue, elevated BP and chest pain, last seen 5/2023, recent INR been therapeutic initially was planning for elective colonoscopy but after stool occult blood negative told not needed, last seen 10/2023, recent INR remain therapeutic 2.4, interval seen by EP/Dr. Aguilar repeat Holter done with 2.6% PVCs burden.    CC: dizziness, and chest pressure, hypertensive BP readings at home x 1-2 days duration    Hypertensive urgency  Microvascular dysfunction due to Hypertensive urgency  hx of coronary artery disease s/p multivessel PCI with known residual ISR of mid-distal LAD s/p brachytherapy 2022  obesity  hypertension  hx of Apical LV thrombus s/p Warfarin use  hx of CVA  subtherapeutic INR  ICD, medtronic      Patient presents for hypertensive BP readings and chest pressure/dizziness.    Reports compliance with medications however has subtherapeutic INR. Has known significant ISR of mid-distal LAD which required brachytherapy in 2022. Unable to cross into distal vessel at that time. Was deemed not a candidate for laser arthrectomy at that time.    Symptoms are consistent with hypertensive readings. Initial cardiac enzyme negative. EKG no acute ischemic changes at present.    Recommendations:  - obtain a 2D TTE  - repeat Lipid panel and add on lipoprotein subanalysis  - restart home anti-anginal therapy. Patient reports improvement with improvement of BP. 's on arrival and now in 160's with marked improvement of symptoms.   - uptitrate bblocker + imdur to maximally tolerated dose  - c/w arb + ranexa  - interrogate medtronic ICD device --> patient denies shocks  - restart warfarin for goal INR 2-3  - goal BP <130 sustained and HR 50-70 at all times  - will need outpatient follow up with primary cardiologist Dr. Rush in 1-2 weeks on discharge    We will follow. Spoke with ER physician Dr. Griffin

## 2024-03-11 NOTE — ED PROVIDER NOTE - NSICDXPASTMEDICALHX_GEN_ALL_CORE_FT
PAST MEDICAL HISTORY:  CAD (coronary artery disease)     Former smoker     Heart block 2:1 HB   dual chamber AICD (DDD ) in May 2018 [facility] (Dr. Crook [place])    HTN (hypertension)     MI (myocardial infarction) 7 coronary stents    Status post CVA residual rt. lower ext weakness.    Stented coronary artery     Thrombosis apical

## 2024-03-11 NOTE — ED CDU PROVIDER INITIAL DAY NOTE - NSICDXPASTMEDICALHX_GEN_ALL_CORE_FT
PAST MEDICAL HISTORY:  CAD (coronary artery disease)     Former smoker     Heart block 2:1 HB   dual chamber AICD (DDD ) in May 2018 [facility] (Dr. Crook [place])    HTN (hypertension)     MI (myocardial infarction) 7 coronary stents    Status post CVA residual rt. lower ext weakness.    Stented coronary artery     Thrombosis apical

## 2024-03-11 NOTE — CONSULT NOTE ADULT - ASSESSMENT
53 year old male with PMH of former smoker, HTN, HLD, CAD w/ multivessel stenting, MI w/ restenosis of RCA and LAD (2017), Last procedure 4/22 found to have severe isr of the mid and distal LAD s/p unsuccessful ballooning -> Brachytherapy to the prox half of the stent as cath did not cross the distal vessel,  LV apical thrombus (coumadin) HFrEF (EF 40-45%), syncope with 2:1 heart block s/p Medtronic AICD 5/2018, CVA, presents to the ER with elevation in b/p for the last 2 days, feeling lightheaded and this am chest pain with rest.  Arrived in ER and found to have a b/p of 167/103  Chest pain free on arrival  Has been having some sob with exertion  trop negative  Ekg unchanged

## 2024-03-12 LAB
A1C WITH ESTIMATED AVERAGE GLUCOSE RESULT: 5.5 % — SIGNIFICANT CHANGE UP (ref 4–5.6)
APPEARANCE UR: CLEAR — SIGNIFICANT CHANGE UP
APTT BLD: 44.2 SEC — HIGH (ref 24.5–35.6)
BACTERIA # UR AUTO: NEGATIVE /HPF — SIGNIFICANT CHANGE UP
BILIRUB UR-MCNC: NEGATIVE — SIGNIFICANT CHANGE UP
CAST: 0 /LPF — SIGNIFICANT CHANGE UP (ref 0–4)
CHOLEST SERPL-MCNC: 130 MG/DL — SIGNIFICANT CHANGE UP
COLOR SPEC: YELLOW — SIGNIFICANT CHANGE UP
DIFF PNL FLD: NEGATIVE — SIGNIFICANT CHANGE UP
ESTIMATED AVERAGE GLUCOSE: 111 MG/DL — SIGNIFICANT CHANGE UP (ref 68–114)
GLUCOSE UR QL: NEGATIVE MG/DL — SIGNIFICANT CHANGE UP
HDLC SERPL-MCNC: 47 MG/DL — SIGNIFICANT CHANGE UP
INR BLD: 1.51 RATIO — HIGH (ref 0.85–1.18)
KETONES UR-MCNC: NEGATIVE MG/DL — SIGNIFICANT CHANGE UP
LEUKOCYTE ESTERASE UR-ACNC: ABNORMAL
LIPID PNL WITH DIRECT LDL SERPL: 71 MG/DL — SIGNIFICANT CHANGE UP
NITRITE UR-MCNC: NEGATIVE — SIGNIFICANT CHANGE UP
NON HDL CHOLESTEROL: 83 MG/DL — SIGNIFICANT CHANGE UP
NT-PROBNP SERPL-SCNC: 121 PG/ML — SIGNIFICANT CHANGE UP (ref 0–300)
PH UR: 7.5 — SIGNIFICANT CHANGE UP (ref 5–8)
PROT UR-MCNC: NEGATIVE MG/DL — SIGNIFICANT CHANGE UP
PROTHROM AB SERPL-ACNC: 16.6 SEC — HIGH (ref 9.5–13)
RBC CASTS # UR COMP ASSIST: 3 /HPF — SIGNIFICANT CHANGE UP (ref 0–4)
SP GR SPEC: 1.02 — SIGNIFICANT CHANGE UP (ref 1–1.03)
SQUAMOUS # UR AUTO: 0 /HPF — SIGNIFICANT CHANGE UP (ref 0–5)
TRIGL SERPL-MCNC: 59 MG/DL — SIGNIFICANT CHANGE UP
TSH SERPL-MCNC: 1.65 UIU/ML — SIGNIFICANT CHANGE UP (ref 0.27–4.2)
UROBILINOGEN FLD QL: 1 MG/DL — SIGNIFICANT CHANGE UP (ref 0.2–1)
WBC UR QL: 0 /HPF — SIGNIFICANT CHANGE UP (ref 0–5)

## 2024-03-12 PROCEDURE — 99233 SBSQ HOSP IP/OBS HIGH 50: CPT

## 2024-03-12 RX ORDER — WARFARIN SODIUM 2.5 MG/1
6 TABLET ORAL ONCE
Refills: 0 | Status: COMPLETED | OUTPATIENT
Start: 2024-03-12 | End: 2024-03-12

## 2024-03-12 RX ADMIN — CLOPIDOGREL BISULFATE 75 MILLIGRAM(S): 75 TABLET, FILM COATED ORAL at 12:02

## 2024-03-12 RX ADMIN — GABAPENTIN 100 MILLIGRAM(S): 400 CAPSULE ORAL at 22:36

## 2024-03-12 RX ADMIN — PANTOPRAZOLE SODIUM 40 MILLIGRAM(S): 20 TABLET, DELAYED RELEASE ORAL at 05:46

## 2024-03-12 RX ADMIN — CARVEDILOL PHOSPHATE 12.5 MILLIGRAM(S): 80 CAPSULE, EXTENDED RELEASE ORAL at 17:30

## 2024-03-12 RX ADMIN — CARVEDILOL PHOSPHATE 12.5 MILLIGRAM(S): 80 CAPSULE, EXTENDED RELEASE ORAL at 05:46

## 2024-03-12 RX ADMIN — WARFARIN SODIUM 6 MILLIGRAM(S): 2.5 TABLET ORAL at 22:37

## 2024-03-12 RX ADMIN — LOSARTAN POTASSIUM 50 MILLIGRAM(S): 100 TABLET, FILM COATED ORAL at 09:39

## 2024-03-12 RX ADMIN — RANOLAZINE 1000 MILLIGRAM(S): 500 TABLET, FILM COATED, EXTENDED RELEASE ORAL at 17:30

## 2024-03-12 RX ADMIN — TAMSULOSIN HYDROCHLORIDE 0.4 MILLIGRAM(S): 0.4 CAPSULE ORAL at 22:37

## 2024-03-12 RX ADMIN — RANOLAZINE 1000 MILLIGRAM(S): 500 TABLET, FILM COATED, EXTENDED RELEASE ORAL at 05:51

## 2024-03-12 RX ADMIN — ATORVASTATIN CALCIUM 80 MILLIGRAM(S): 80 TABLET, FILM COATED ORAL at 22:37

## 2024-03-12 RX ADMIN — SPIRONOLACTONE 25 MILLIGRAM(S): 25 TABLET, FILM COATED ORAL at 05:46

## 2024-03-12 NOTE — ED ADULT NURSE REASSESSMENT NOTE - NS ED NURSE REASSESS COMMENT FT1
assumed care of pt from LATONIA Calvin. pt a&ox3, denies any current chest pain/palpatiations/weakness. pt ambulatory to bathroom with steady gait. pt in no acute distress, resting comfortably in bed. resp even and unlabored. pt updated on poc and safety maintained

## 2024-03-12 NOTE — ED ADULT NURSE REASSESSMENT NOTE - NS ED NURSE REASSESS COMMENT FT1
Cherri received in the stretcher resting comfortably , no distress noted, no chest pain reported. Breathing easy and unlabored.  VSS . Pt awaiting cardiologist consult . Will continue to monitor

## 2024-03-12 NOTE — ED ADULT NURSE REASSESSMENT NOTE - NS ED NURSE REASSESS COMMENT FT1
pt resting comfortably in stretcher, NSR on telemetry, respirations even and unlabored. pt shows no s/s of acute distress at this time. safety precautions maintained. pt updated on plan of care.

## 2024-03-12 NOTE — ED ADULT NURSE REASSESSMENT NOTE - NS ED NURSE REASSESS COMMENT FT1
Pt resting comfortably , no further episodes of chest pain . Pt updated regarding patient plan of care. Will continue to monitor

## 2024-03-12 NOTE — ED ADULT NURSE REASSESSMENT NOTE - NS ED NURSE REASSESS COMMENT FT1
pt resting comfortably in stretcher, respirations even and unlabored. pt shows no s/s of acute distress at this time. safety precautions maintained.

## 2024-03-12 NOTE — PROGRESS NOTE ADULT - PROBLEM SELECTOR PLAN 1
-SBP at home 120s. Does not have frequent fluctuations per patient report    -Endorses diet compliance with low sat intake  -Has been compliant with home medications  -Losartan home dose increased to 50mg  -Has intolerance to imdur and has not been taking it for the past three months 2/2 headaches  -TTE with out significant changes  -ICD interrogation without events -SBP at home 120s. Does not have frequent fluctuations per patient report    -Endorses diet compliance with low sat intake  -Has been compliant with home medications  -Losartan home dose increased to 50mg  -Has intolerance to imdur per patient report and has not been taking it for the past three months 2/2 headaches  -TTE with out significant changes  -ICD interrogation without events    Signing off  No further workup  Ensure INR therapeutic prior to DC   Outpatient follow up with DR Rush

## 2024-03-12 NOTE — PROGRESS NOTE ADULT - NS ATTEND AMEND GEN_ALL_CORE FT
53M Creole-speaking man with history of CAD with multivessel stenting and MI (2017 at [facility] with restenosis of RCA and LAD s/p PEPE to mRCA), LV apical thrombus (previously been on warfarin since 2017 discontinued on 2020), HFrEF (35-40% in 2018), syncope with 2:1 heart block s/p Medtronic AICD 5/2018 (DDD ), CVA (6/2019) and with unstable angina in 11/2019 s/p PCI/stent to mLCx at [facility], previously follows at [facility] with Dr. Crook but since left practice without follow up, presented to [facility] on 9/2/20 with unstable angina, serial Troponin negative, repeat TTE with EF 40-45% (upon my own review) and resolved LV apical thrombus (known since 11/2019 but unclear reason remains on warfarin and self discontinued clopidogrel 75mg since 7/2019), underwent LHC found with patent stents in LCx, RCA and moderate-severe instent restenosis of [id number] LAD, given small caliber size of the vessel decided medical management with antianginal (increased Imdur to 120mg), presented for initial outpatient cardiology evaluation on 9/15/20, added Ranexa to Imdur for severe angina, still with refractory symptoms and he presented again to [facility] on 9/23/20, underwent PCI/PEPE x2 to dLAD for 95% severe instent restenosis, repeat TTE off warfarin found with apical akinesis/aneurysm with calcified mural thrombus, LV EF 40-45%, had 21 beats WCT, increased carvedilol dose to 25mg BID, discharged home on 9/24/20, prior visit 5/2021 after returned back from [facility] with borderline SBP reduced carvedilol dose to 12.5mg, last visit 11/2021 interval with recurrent CVAs had repeat TTE with Definity confirmed recurrence of LV apical thrombus, restarted back on warfarin, last seen in office 1/2022, interval readmitted to [facility] 2/28/22 for chest pain, CTA done with probable focal dissection at the distal abdominal aorta, repeat LHC shown extensive instent restenosis in mLAD with prior 2 layers of stent, patent RCA and LCx stents, patient referred for brachytherapy at [facility] with Dr. Hannah Cheatham found 90% ISR at mLAD unable to deliver catheter to the distal segment with partial improvement of proximal half of the vessel (80% mLAD), last visit 4/2022 still with recurrent stable anginal symptoms, interval followed with EPS/Dr. Barfield had 1 week Holter done with 6% PVCs, last seen 11/2022, interval followed with EP pending repeat MCOT on future follow up if PVC burden >10% the required intervention, last office visit 4/2023 overall stable and therapeutic INR, interval been to [facility] on 5/24/23 for sudden fatigue, elevated BP and chest pain, last seen 5/2023, recent INR been therapeutic initially was planning for elective colonoscopy but after stool occult blood negative told not needed, last seen 10/2023, recent INR remain therapeutic 2.4, interval seen by EP/Dr. Aguilar repeat Holter done with 2.6% PVCs burden.    CC: dizziness, and chest pressure, hypertensive BP readings at home x 1-2 days duration    Hypertensive urgency  Microvascular dysfunction due to Hypertensive urgency  hx of coronary artery disease s/p multivessel PCI with known residual ISR of mid-distal LAD s/p brachytherapy 2022  obesity  hypertension  hx of Apical LV thrombus s/p Warfarin use  hx of CVA  subtherapeutic INR  ICD, medtronic      chest pressure has resolved now that we have effectively bblockaded the patient, the HR is in the 60's and BP is controlled per guidelines.    He has subtherapeutic INR.    I reviewed the 2D TTE personally and there is concern that he will develop further an LV thrombus given slow flow and swirling of the LV apex.    Recommend to keep patient until therapeutic INR. If he insists on discharge, then recommend lovenox to warfarin bridge with goal INR 2-3. He has no chest pain and feels well.    discharge on current medication regimen as ordered.    He should follow up with his primary cardiologist Dr. Rush in 1 week post discharge.    We will follow peripherally. please call with questions.

## 2024-03-12 NOTE — PROGRESS NOTE ADULT - SUBJECTIVE AND OBJECTIVE BOX
[facility]                                                         CARDIOLOGY AT [facility]                                                                  [address] [facility] [place] [address]                                                         Telephone: [phone]. Fax:[phone]                                                                             PROGRESS NOTE    Reason for follow up: CP, HTN  Update: HTN improved. TTE without acute changes       Review of symptoms:   Cardiac:  No chest pain. No dyspnea. No palpitations.  Respiratory: no cough. No dyspnea  Gastrointestinal: No diarrhea. No abdominal pain. No bleeding.   Neuro: No focal neuro complaints.    Vitals:  T(C): 36.7 (03-12-24 @ 07:26), Max: 36.9 (03-11-24 @ 23:30)  HR: 65 (03-12-24 @ 09:44) (59 - 75)  BP: 121/71 (03-12-24 @ 09:44) (102/57 - 168/89)  RR: 18 (03-12-24 @ 07:26) (14 - 20)  SpO2: 98% (03-12-24 @ 07:26) (97% - 99%)  Wt(kg): --  I&O's Summary    Weight (kg): 91 (03-11 @ 11:21)    PHYSICAL EXAM:  Appearance: Comfortable. No acute distress  HEENT:  Atraumatic. Normocephalic.  Normal oral mucosa  Neurologic: A & O x 3, no gross focal deficits.  Cardiovascular: RRR S1 S2, No murmur, no rubs/gallops. No JVD  Respiratory: Lungs clear to auscultation, unlabored   Gastrointestinal:  Soft, Non-tender, + BS  Lower Extremities: 2+ Peripheral Pulses, No clubbing, cyanosis, or edema  Psychiatry: Patient is calm. No agitation.   Skin: warm and dry.    CURRENT CARDIAC MEDICATIONS:  carvedilol 12.5 milliGRAM(s) Oral every 12 hours  isosorbide   mononitrate ER Tablet (IMDUR) 60 milliGRAM(s) Oral daily  losartan 50 milliGRAM(s) Oral daily  ranolazine 1000 milliGRAM(s) Oral two times a day  spironolactone 25 milliGRAM(s) Oral daily      CURRENT OTHER MEDICATIONS:  gabapentin 100 milliGRAM(s) Oral at bedtime  pantoprazole    Tablet 40 milliGRAM(s) Oral before breakfast  atorvastatin 80 milliGRAM(s) Oral at bedtime  clopidogrel Tablet 75 milliGRAM(s) Oral daily  tamsulosin 0.4 milliGRAM(s) Oral at bedtime  warfarin 3 milliGRAM(s) Oral daily, Stop order after: 3 Days      LABS:	 	                            16.1   8.27  )-----------( 205      ( 11 Mar 2024 12:30 )             48.8     03-11    138  |  99  |  14.6  ----------------------------<  120<H>  4.3   |  26.0  |  0.86    Ca    9.2      11 Mar 2024 12:30    TPro  7.3  /  Alb  4.5  /  TBili  0.6  /  DBili  x   /  AST  26  /  ALT  22  /  AlkPhos  57  03-11    PT/INR/PTT ( 12 Mar 2024 05:44 )                       :                       :      16.6         :       44.2                  .        .                   .              .           .       1.51        .                                       Lipid Profile: Date: 03-12 @ 05:44  Total cholesterol 130; Direct LDL: --; HDL: 47; Triglycerides:59    HgA1c:   TSH: Thyroid Stimulating Hormone, Serum: 1.65 uIU/mL      TELEMETRY: SR      DIAGNOSTIC TESTING:  [ ] Echocardiogram:   < from: TTE W or WO Ultrasound Enhancing Agent (03.11.24 @ 19:20) >  CONCLUSIONS:      1. Moderate left ventricular hypertrophy.   2. Left ventricular cavity is normal in size. Left ventricular systolic function is moderately decreased with an ejection fraction visually estimated at 40 to 45 %.   3. Entire apex and basal inferior segment are abnormal.   4. There is mild (grade 1) left ventricular diastolic dysfunction, with normal filling pressure.   5. Normal right ventricular cavity size and normal systolic function.   6. The left atrium is normal.   7. The right atrium is normal in size.   8. Fibrocalcific aortic valve sclerosis without stenosis.   9. Mild mitral valve leaflet calcification.  10. Estimated pulmonary artery systolic pressure is 12 mmHg.  11. The interatrial septum appears intact.  12. No pericardial effusion seen.    < end of copied text >    [ ]  Catheterization:  < from: Cardiac Catheterization (04.13.22 @ 14:16) >  Conclusions:   Severe ISR of mid and distal LAD. Very small caliber vessel with  underexpanded stent. Despite aggressive ballooning, unable    to fully expand the stents. Laser atherectomy was deferred due to risk  for vascular injury .  Brachytherapy was performed to the proximal half of the stent as  catheter did not cross to the distal vessel.    < end of copied text >

## 2024-03-13 LAB
APTT BLD: 36.1 SEC — HIGH (ref 24.5–35.6)
INR BLD: 1.73 RATIO — HIGH (ref 0.85–1.18)
LIDOCAIN SERPL-MCNC: 191 NMOL/L — HIGH
PROTHROM AB SERPL-ACNC: 18.9 SEC — HIGH (ref 9.5–13)

## 2024-03-13 PROCEDURE — 93010 ELECTROCARDIOGRAM REPORT: CPT

## 2024-03-13 PROCEDURE — 99233 SBSQ HOSP IP/OBS HIGH 50: CPT

## 2024-03-13 RX ORDER — ACETAMINOPHEN 500 MG
650 TABLET ORAL ONCE
Refills: 0 | Status: COMPLETED | OUTPATIENT
Start: 2024-03-13 | End: 2024-03-13

## 2024-03-13 RX ORDER — ENOXAPARIN SODIUM 100 MG/ML
90 INJECTION SUBCUTANEOUS ONCE
Refills: 0 | Status: COMPLETED | OUTPATIENT
Start: 2024-03-13 | End: 2024-03-13

## 2024-03-13 RX ORDER — ENOXAPARIN SODIUM 100 MG/ML
90 INJECTION SUBCUTANEOUS EVERY 12 HOURS
Refills: 0 | Status: DISCONTINUED | OUTPATIENT
Start: 2024-03-13 | End: 2024-03-18

## 2024-03-13 RX ORDER — WARFARIN SODIUM 2.5 MG/1
5 TABLET ORAL AT BEDTIME
Refills: 0 | Status: DISCONTINUED | OUTPATIENT
Start: 2024-03-13 | End: 2024-03-18

## 2024-03-13 RX ADMIN — ATORVASTATIN CALCIUM 80 MILLIGRAM(S): 80 TABLET, FILM COATED ORAL at 22:03

## 2024-03-13 RX ADMIN — WARFARIN SODIUM 5 MILLIGRAM(S): 2.5 TABLET ORAL at 22:04

## 2024-03-13 RX ADMIN — RANOLAZINE 1000 MILLIGRAM(S): 500 TABLET, FILM COATED, EXTENDED RELEASE ORAL at 17:39

## 2024-03-13 RX ADMIN — CLOPIDOGREL BISULFATE 75 MILLIGRAM(S): 75 TABLET, FILM COATED ORAL at 11:59

## 2024-03-13 RX ADMIN — ISOSORBIDE MONONITRATE 60 MILLIGRAM(S): 60 TABLET, EXTENDED RELEASE ORAL at 11:59

## 2024-03-13 RX ADMIN — GABAPENTIN 100 MILLIGRAM(S): 400 CAPSULE ORAL at 22:02

## 2024-03-13 RX ADMIN — TAMSULOSIN HYDROCHLORIDE 0.4 MILLIGRAM(S): 0.4 CAPSULE ORAL at 22:03

## 2024-03-13 RX ADMIN — ENOXAPARIN SODIUM 90 MILLIGRAM(S): 100 INJECTION SUBCUTANEOUS at 11:59

## 2024-03-13 RX ADMIN — PANTOPRAZOLE SODIUM 40 MILLIGRAM(S): 20 TABLET, DELAYED RELEASE ORAL at 08:29

## 2024-03-13 RX ADMIN — RANOLAZINE 1000 MILLIGRAM(S): 500 TABLET, FILM COATED, EXTENDED RELEASE ORAL at 05:36

## 2024-03-13 RX ADMIN — Medication 650 MILLIGRAM(S): at 15:54

## 2024-03-13 RX ADMIN — LOSARTAN POTASSIUM 50 MILLIGRAM(S): 100 TABLET, FILM COATED ORAL at 05:36

## 2024-03-13 RX ADMIN — CARVEDILOL PHOSPHATE 12.5 MILLIGRAM(S): 80 CAPSULE, EXTENDED RELEASE ORAL at 19:49

## 2024-03-13 RX ADMIN — Medication 650 MILLIGRAM(S): at 14:51

## 2024-03-13 RX ADMIN — CARVEDILOL PHOSPHATE 12.5 MILLIGRAM(S): 80 CAPSULE, EXTENDED RELEASE ORAL at 05:36

## 2024-03-13 RX ADMIN — SPIRONOLACTONE 25 MILLIGRAM(S): 25 TABLET, FILM COATED ORAL at 05:36

## 2024-03-13 NOTE — ED ADULT NURSE REASSESSMENT NOTE - NS ED NURSE REASSESS COMMENT FT1
Pt complains of slight pain at defibrillator site. rated at a 3. PA notified. Pt to be medicated per PA order. Pt complains of slight pain at defibrillator site. rated 3 out of 10. PA notified. Pt to be medicated per PA order.

## 2024-03-13 NOTE — ED ADULT NURSE REASSESSMENT NOTE - GENERAL PATIENT STATE
comfortable appearance/cooperative
comfortable appearance/resting/sleeping
comfortable appearance
comfortable appearance/resting/sleeping

## 2024-03-13 NOTE — ED ADULT NURSE REASSESSMENT NOTE - NS ED NURSE REASSESS COMMENT FT1
Assume care of patient at 715, no signs of distress Assumed care of patient From Night RN at 715. Pt A&Ox4. No signs of distress.  Pt offering no complaints at this time. safety measures maintained.

## 2024-03-13 NOTE — ED ADULT NURSE REASSESSMENT NOTE - NS ED NURSE REASSESS COMMENT FT1
Patient a&ox3, no acute distress, resp nonlabored, resting comfortably in bed. equal chest rise and fall noted. pt remains on CM and . safety maintained with bed locked in lowest position.

## 2024-03-13 NOTE — ED CDU PROVIDER SUBSEQUENT DAY NOTE - PROGRESS NOTE DETAILS
INR 1.73, still subtherapeutic. Cardiology recommending pt stay until therapeutic INR. spoke with pt who is okay with staying. Discussed case with Dr. Santizo, hospitalist, regarding possible admission. Recommending bridging with lovenox 90mg BID and giving warfarin 5mg this evening, repeat INR in AM. If still subtherapeutic then will admit.
echo reviewed with cardiology, no further inpatient work up, however recommending INR to be therapeutic prior to discharge.  Decision made to increase coumadin from 3mg to 6mg.  Last dose given 3/11 @2100.  Ordered for 6mg today 3/12 @ 2100 and rpt INR in AM ordered.

## 2024-03-13 NOTE — ED ADULT NURSE REASSESSMENT NOTE - NS ED NURSE REASSESS COMMENT FT1
pt a&ox3 in no acute distress, resp even and unlabored. pt remains on CM and . resting comfortably in bed, safety precautions maintained

## 2024-03-13 NOTE — ED ADULT NURSE REASSESSMENT NOTE - NS ED NURSE REASSESS COMMENT FT1
Assumed care of patient at 1930 from Monet Godinez and Zackery Rojo RN.  Patient A&Ox4.  Respirations even and unlabored. Skin color appropriate for ethnicity. NAD. Pt currently in ED observation. Patient denies pain/discomfort, denies CP/SOB at this time. Stretcher locked in lowest position, IV site flushed, intact, and patent. No redness, swelling or pain noted to IV site. No signs of acute distress noted, safety maintained. Pt NSR on cardiac monitor. Pt awaiting INR to be in therapeutic rage. Patient updated on the plan of care.

## 2024-03-13 NOTE — ED ADULT NURSE REASSESSMENT NOTE - NS ED NURSE REASSESS COMMENT FT1
Lovenox ordered received. Ordered clarified with DANIELLE Ovalle that pt is to receive lovenox in addition to current anticoagulant medications.

## 2024-03-13 NOTE — ED ADULT NURSE REASSESSMENT NOTE - COMFORT CARE
Addended by: TAMMY YAN on: 4/1/2021 02:41 PM     Modules accepted: Orders    
Addended by: TAMMY YAN on: 4/1/2021 04:02 PM     Modules accepted: Orders    
Addended by: TAMMY YAN on: 4/2/2021 08:40 AM     Modules accepted: Orders    
plan of care explained
meal provided
plan of care explained/po fluids offered/repositioned/treatment delay explained/wait time explained/warm blanket provided
side rails up

## 2024-03-14 VITALS
TEMPERATURE: 98 F | SYSTOLIC BLOOD PRESSURE: 126 MMHG | HEART RATE: 57 BPM | RESPIRATION RATE: 18 BRPM | DIASTOLIC BLOOD PRESSURE: 77 MMHG | OXYGEN SATURATION: 100 %

## 2024-03-14 LAB
APTT BLD: 67.3 SEC — HIGH (ref 24.5–35.6)
INR BLD: 2.09 RATIO — HIGH (ref 0.85–1.18)
PROTHROM AB SERPL-ACNC: 22.7 SEC — HIGH (ref 9.5–13)
TROPONIN T, HIGH SENSITIVITY RESULT: 12 NG/L — SIGNIFICANT CHANGE UP (ref 0–51)

## 2024-03-14 PROCEDURE — 99285 EMERGENCY DEPT VISIT HI MDM: CPT | Mod: 25

## 2024-03-14 PROCEDURE — 83695 ASSAY OF LIPOPROTEIN(A): CPT

## 2024-03-14 PROCEDURE — 93005 ELECTROCARDIOGRAM TRACING: CPT

## 2024-03-14 PROCEDURE — C8929: CPT

## 2024-03-14 PROCEDURE — G0378: CPT

## 2024-03-14 PROCEDURE — 85027 COMPLETE CBC AUTOMATED: CPT

## 2024-03-14 PROCEDURE — 84478 ASSAY OF TRIGLYCERIDES: CPT

## 2024-03-14 PROCEDURE — 84484 ASSAY OF TROPONIN QUANT: CPT

## 2024-03-14 PROCEDURE — 81001 URINALYSIS AUTO W/SCOPE: CPT

## 2024-03-14 PROCEDURE — 84443 ASSAY THYROID STIM HORMONE: CPT

## 2024-03-14 PROCEDURE — 83700 LIPOPRO BLD ELECTROPHORETIC: CPT

## 2024-03-14 PROCEDURE — 83036 HEMOGLOBIN GLYCOSYLATED A1C: CPT

## 2024-03-14 PROCEDURE — 36415 COLL VENOUS BLD VENIPUNCTURE: CPT

## 2024-03-14 PROCEDURE — 80053 COMPREHEN METABOLIC PANEL: CPT

## 2024-03-14 PROCEDURE — 80061 LIPID PANEL: CPT

## 2024-03-14 PROCEDURE — 85730 THROMBOPLASTIN TIME PARTIAL: CPT

## 2024-03-14 PROCEDURE — 85610 PROTHROMBIN TIME: CPT

## 2024-03-14 PROCEDURE — 83880 ASSAY OF NATRIURETIC PEPTIDE: CPT

## 2024-03-14 PROCEDURE — 71045 X-RAY EXAM CHEST 1 VIEW: CPT

## 2024-03-14 PROCEDURE — 99239 HOSP IP/OBS DSCHRG MGMT >30: CPT

## 2024-03-14 RX ORDER — RANOLAZINE 500 MG/1
1000 TABLET, FILM COATED, EXTENDED RELEASE ORAL
Qty: 60 | Refills: 0
Start: 2024-03-14 | End: 2024-04-12

## 2024-03-14 RX ORDER — ISOSORBIDE MONONITRATE 60 MG/1
1 TABLET, EXTENDED RELEASE ORAL
Qty: 30 | Refills: 0
Start: 2024-03-14 | End: 2024-04-12

## 2024-03-14 RX ORDER — CLOPIDOGREL BISULFATE 75 MG/1
1 TABLET, FILM COATED ORAL
Qty: 30 | Refills: 0
Start: 2024-03-14 | End: 2024-04-12

## 2024-03-14 RX ORDER — WARFARIN SODIUM 2.5 MG/1
1 TABLET ORAL
Qty: 14 | Refills: 0
Start: 2024-03-14 | End: 2024-03-27

## 2024-03-14 RX ORDER — LOSARTAN POTASSIUM 100 MG/1
1 TABLET, FILM COATED ORAL
Qty: 30 | Refills: 0
Start: 2024-03-14 | End: 2024-04-12

## 2024-03-14 RX ORDER — SPIRONOLACTONE 25 MG/1
1 TABLET, FILM COATED ORAL
Qty: 31 | Refills: 0
Start: 2024-03-14 | End: 2024-04-13

## 2024-03-14 RX ORDER — CARVEDILOL PHOSPHATE 80 MG/1
1 CAPSULE, EXTENDED RELEASE ORAL
Qty: 60 | Refills: 0
Start: 2024-03-14 | End: 2024-04-12

## 2024-03-14 RX ORDER — ATORVASTATIN CALCIUM 80 MG/1
1 TABLET, FILM COATED ORAL
Qty: 30 | Refills: 0
Start: 2024-03-14 | End: 2024-04-12

## 2024-03-14 RX ADMIN — PANTOPRAZOLE SODIUM 40 MILLIGRAM(S): 20 TABLET, DELAYED RELEASE ORAL at 09:07

## 2024-03-14 RX ADMIN — LOSARTAN POTASSIUM 50 MILLIGRAM(S): 100 TABLET, FILM COATED ORAL at 09:07

## 2024-03-14 RX ADMIN — SPIRONOLACTONE 25 MILLIGRAM(S): 25 TABLET, FILM COATED ORAL at 05:19

## 2024-03-14 RX ADMIN — RANOLAZINE 1000 MILLIGRAM(S): 500 TABLET, FILM COATED, EXTENDED RELEASE ORAL at 05:19

## 2024-03-14 RX ADMIN — ENOXAPARIN SODIUM 90 MILLIGRAM(S): 100 INJECTION SUBCUTANEOUS at 00:10

## 2024-03-14 NOTE — ED ADULT NURSE REASSESSMENT NOTE - NS ED NURSE REASSESS COMMENT FT1
Pt resting in stretcher comfortably at this time. NAD. VSS. Pt NSR on cardiac monitor. Plan of care on going.

## 2024-03-14 NOTE — ED CDU PROVIDER SUBSEQUENT DAY NOTE - NS ED ROS FT
see hpi
Gen: denies fever, chills, fatigue, weight loss  Skin: denies rashes, laceration, bruising  HEENT: denies visual changes, ear pain, nasal congestion, throat pain  Respiratory: denies NORIEGA, SOB, cough, wheezing  Cardiovascular: +cp denies Palpitations, diaphoresis, LE edema  GI: denies abdominal pain, n/v/d  : denies dysuria, frequency, urgency, bowel/bladder incontinence  MSK: denies joint swelling/pain, back pain, neck pain  Neuro: denies headache, dizziness, weakness, numbness  Psych: denies anxiety, depression, SI/HI, visual/auditory hallucinations
see hpi

## 2024-03-14 NOTE — ED CDU PROVIDER SUBSEQUENT DAY NOTE - NS ED ATTENDING STATEMENT MOD
This was a shared visit with the KWABENA. I reviewed and verified the documentation.

## 2024-03-14 NOTE — ED CDU PROVIDER SUBSEQUENT DAY NOTE - PHYSICAL EXAMINATION
Gen: NAD, AOx3  Head: NCAT  HEENT: EOMI, oral mucosa moist, normal conjunctiva, neck supple  Lung: no respiratory distress  CV:  Normal perfusion  Abd: soft, NT ND  MSK: No edema, no visible deformities  Neuro: No focal neurologic deficits  Skin: No rash   Psych: normal affect
Physical Examination:   Gen: NAD, AOx3  Head: NCAT  HEENT: EOMI, oral mucosa moist, normal conjunctiva, neck supple  Lung: no respiratory distress  CV:  Normal perfusion  Abd: soft, NT ND  MSK: No edema, no visible deformities  Neuro: No focal neurologic deficits  Skin: No rash   Psych: normal affect
Physical Examination:   Gen: NAD, AOx3  Head: NCAT  HEENT: EOMI, oral mucosa moist, normal conjunctiva, neck supple  Lung: no respiratory distress  CV:  Normal perfusion  Abd: soft, NT ND  MSK: No edema, no visible deformities  Neuro: No focal neurologic deficits  Skin: No rash   Psych: normal affect

## 2024-03-14 NOTE — ED CDU PROVIDER SUBSEQUENT DAY NOTE - CLINICAL SUMMARY MEDICAL DECISION MAKING FREE TEXT BOX
54yo M former smoker, PMHx HTN, HLD, CAD w/ multivessel stenting, MI w/ restenosis of RCA and LAD (2017), Last procedure 4/22 found to have severe ISR of the mid and distal LAD s/p unsuccessful ballooning -> Brachytherapy to the prox half of the stent as cath did not cross the distal vessel,  LV apical thrombus (coumadin), HFrEF (EF 40-45%), syncope with 2:1 heart block s/p Medtronic AICD 5/2018, CVA presents to ED for evaluation of chest pain now pending TTE
52yo M former smoker, PMHx HTN, HLD, CAD w/ multivessel stenting, MI w/ restenosis of RCA and LAD (2017), Last procedure 4/22 found to have severe ISR of the mid and distal LAD s/p unsuccessful ballooning -> Brachytherapy to the prox half of the stent as cath did not cross the distal vessel,  LV apical thrombus (coumadin), HFrEF (EF 40-45%), syncope with 2:1 heart block s/p Medtronic AICD 5/2018, CVA presents to ED for evaluation of chest pain.  Seen by cardiology , TTE performed and reviewed by cardiology team, medication adjustments were made.  INR subtherapeutic and will need to be back in therapeutic INR range before DC.  Dose of warfarin was doubled 3/12, received lovenox and then received warfarin 5 mg lastnight. Pending AM INR check
52yo M former smoker, PMHx HTN, HLD, CAD w/ multivessel stenting, MI w/ restenosis of RCA and LAD (2017), Last procedure 4/22 found to have severe ISR of the mid and distal LAD s/p unsuccessful ballooning -> Brachytherapy to the prox half of the stent as cath did not cross the distal vessel,  LV apical thrombus (coumadin), HFrEF (EF 40-45%), syncope with 2:1 heart block s/p Medtronic AICD 5/2018, CVA presents to ED for evaluation of chest pain.  Seen by cardiology , TTE performed and reviewed by cardiology team, medication adjustments were made  INR subtherapeutic and will need to be back in therapeutic INR range before DC  Dose of warfarin was doubled 3/12, pending repeat INR this AM.

## 2024-03-14 NOTE — ED CDU PROVIDER DISPOSITION NOTE - CARE PROVIDER_API CALL
Mulugeta Rush  Cardiovascular Disease  [address] [facility], [address] [address]  Phone: [phone]  Fax: [phone]  Follow Up Time:

## 2024-03-14 NOTE — ED CDU PROVIDER SUBSEQUENT DAY NOTE - NSICDXFAMILYHX_GEN_ALL_CORE_FT
FAMILY HISTORY:  No pertinent family history in first degree relatives    
none

## 2024-03-14 NOTE — ED CDU PROVIDER DISPOSITION NOTE - ATTENDING CONTRIBUTION TO CARE
I, Cliff Munoz MD, performed the initial face to face bedside interview with this patient regarding history of present illness, review of symptoms and relevant past medical, social and family history.  I completed an independent physical examination.  I was the initial provider who evaluated this patient. I have signed out the follow up of any pending tests (i.e. labs, radiological studies) to the ACP.  I have communicated the patient’s plan of care and disposition with the ACP.

## 2024-03-14 NOTE — ED CDU PROVIDER DISPOSITION NOTE - CLINICAL COURSE
Patient kept overnight for supratherapeutic INR, coumadin adjusted from 3mg to 5mg.  INR now 2. This is a 53 year old male with PMH of former smoker, HTN, HLD, CAD w/ multivessel stenting, MI w/ restenosis of RCA and LAD (2017), Last procedure 4/22 found to have severe isr of the mid and distal LAD s/p unsuccessful ballooning -> Brachytherapy to the prox half of the stent as cath did not cross the distal vessel,  LV apical thrombus (coumadin) HFrEF (EF 40-45%), syncope with 2:1 heart block s/p Medtronic AICD 5/2018, CVA, presents to the ER with elevation in b/p for the last 2 days, feeling lightheaded and this am chest pain with rest.  Arrived in ER and found to have a b/p of 167/103.  Chest pain free on arrival  Has been having some sob with exertion, trop negative, Ekg unchanged.  Patient kept overnight for supratherapeutic INR, coumadin adjusted from 3mg to 5mg.  INR now 2.  Stable for discharge for outpatient f/u with cardiology, informed to have INR checked within week.  Given copies of all results, understands and agrees to proceed.

## 2024-03-14 NOTE — ED CDU PROVIDER SUBSEQUENT DAY NOTE - HISTORY
Pt remained stable. No acute events or complaints overnight
No acute events overnight
Pt resting comfortably at time of re-assessment. No events overnight. Pending rpt labs. Will continue to monitor.

## 2024-03-14 NOTE — ED ADULT NURSE REASSESSMENT NOTE - NS ED NURSE REASSESS COMMENT FT1
Assumed care of pt from previous RN. Pt A&Ox4, NAD. Breathing even and unlabored. Offering no complaints at this time. Placed on CM, SB. Will CTM.

## 2024-03-14 NOTE — ED CDU PROVIDER DISPOSITION NOTE - NSFOLLOWUPINSTRUCTIONS_ED_ALL_ED_FT
Please follow up with cardiology Please follow up with cardiology    Please take coumadin as prescribed    Please have INR check within 1 week

## 2024-03-14 NOTE — ED CDU PROVIDER DISPOSITION NOTE - PATIENT PORTAL LINK FT
You can access the FollowMyHealth Patient Portal offered by [facility] by registering at the following website: [email or web address]. By joining [facility]’s FollowMyHealth portal, you will also be able to view your health information using other applications (apps) compatible with our system.

## 2024-03-14 NOTE — ED CDU PROVIDER SUBSEQUENT DAY NOTE - ATTENDING APP SHARED VISIT CONTRIBUTION OF CARE
I, Cliff Munoz MD, performed the initial face to face bedside interview with this patient regarding history of present illness, review of symptoms and relevant past medical, social and family history.  I completed an independent physical examination.  I was the initial provider who evaluated this patient. I have signed out the follow up of any pending tests (i.e. labs, radiological studies) to the ACP.  I have communicated the patient’s plan of care and disposition with the ACP.
I, Valentin Griffin, performed the initial face to face bedside interview with this patient regarding history of present illness, review of symptoms and relevant past medical, social and family history.  I completed an independent physical examination.  I was the initial provider who evaluated this patient. I have signed out the follow up of any pending tests (i.e. labs, radiological studies) to the ACP.  I have communicated the patient’s plan of care and disposition with the ACP.
I, Federico Sherman MD, have seen and examined the patient on the date of service.  I agree with the KWABENA's assessment as written, with exceptions or additions as noted below or in a separate note.  Patient with past medical history of extensive cardiac history with known LV thrombus on warfarin presented with chest pain.  Seen by the cardiology team, recommend that patient stay until his INR is therapeutic.  Case was discussed with hospitalist, Dr. Santizo, advise holding off on admission at this time.  Recommend continuing Lovenox 90 mg twice daily until he is therapeutic.  Will hold in observation unit for additional dose of warfarin tonight and repeat INR tomorrow.

## 2024-03-14 NOTE — ED ADULT NURSE REASSESSMENT NOTE - NS ED NURSE REASSESS COMMENT FT1
Pt A&Ox4. Respirations even and unlabored. NAD. BP soft 103/65. PA Emily aware. Per PA move coreg and losartan to 9am.

## 2024-03-14 NOTE — ED CDU PROVIDER SUBSEQUENT DAY NOTE - NSICDXPASTSURGICALHX_GEN_ALL_CORE_FT
PAST SURGICAL HISTORY:  Cardiac defibrillator in place 2017  Medtronic Visa AF MRI VR Surescan ICD   Model# [id number], SN# [id number]    
PAST SURGICAL HISTORY:  Cardiac defibrillator in place 2017  Medtronic Visa AF MRI VR Surescan ICD   Model# EMKG7E7, SN# [id number]    
PAST SURGICAL HISTORY:  Cardiac defibrillator in place 2017  Medtronic Visa AF MRI VR Surescan ICD   Model# [id number], SN# [id number]

## 2024-03-20 LAB
ALPHA LIPOPROTEINS: NORMAL — SIGNIFICANT CHANGE UP
BETA LIPOPROTEINS: NORMAL — SIGNIFICANT CHANGE UP
CHOLESTEROL, TOTAL RESULT: 144 MG/DL — SIGNIFICANT CHANGE UP
CHYLOMICRONS: NORMAL — SIGNIFICANT CHANGE UP
INTERPRETATION: SIGNIFICANT CHANGE UP
PRE-BETA LIPOPROTEINS: NORMAL — SIGNIFICANT CHANGE UP
SERUM APPEARANCE: CLEAR — SIGNIFICANT CHANGE UP
TRIGLYCERIDES RESULT: 58 MG/DL — SIGNIFICANT CHANGE UP

## 2024-03-27 ENCOUNTER — NON-APPOINTMENT (OUTPATIENT)
Age: 54
End: 2024-03-27

## 2024-03-29 LAB
INR PPP: 5.29 RATIO
PT BLD: 57.4 SEC

## 2024-04-04 ENCOUNTER — APPOINTMENT (OUTPATIENT)
Dept: CARDIOLOGY | Facility: CLINIC | Age: 54
End: 2024-04-04
Payer: COMMERCIAL

## 2024-04-04 ENCOUNTER — NON-APPOINTMENT (OUTPATIENT)
Age: 54
End: 2024-04-04

## 2024-04-04 VITALS
SYSTOLIC BLOOD PRESSURE: 108 MMHG | DIASTOLIC BLOOD PRESSURE: 66 MMHG | HEART RATE: 69 BPM | BODY MASS INDEX: 29.7 KG/M2 | HEIGHT: 68 IN | WEIGHT: 196 LBS | OXYGEN SATURATION: 98 %

## 2024-04-04 DIAGNOSIS — R09.89 OTHER SPECIFIED SYMPTOMS AND SIGNS INVOLVING THE CIRCULATORY AND RESPIRATORY SYSTEMS: ICD-10-CM

## 2024-04-04 DIAGNOSIS — Z79.01 LONG TERM (CURRENT) USE OF ANTICOAGULANTS: ICD-10-CM

## 2024-04-04 PROCEDURE — G2211 COMPLEX E/M VISIT ADD ON: CPT | Mod: NC,1L

## 2024-04-04 PROCEDURE — 93000 ELECTROCARDIOGRAM COMPLETE: CPT

## 2024-04-04 PROCEDURE — 99214 OFFICE O/P EST MOD 30 MIN: CPT | Mod: 25

## 2024-04-04 NOTE — CARDIOLOGY SUMMARY
[___] : [unfilled] [LVEF ___%] : LVEF [unfilled]% [de-identified] : 11/15/21- sinus 75, normal axis, inferior infarct, precordial ST, lateral T-wave inversion, QTc 396 1/21/22- sinus 75, normal axis, inferolateral infarct, precordial ST, lateral T-wave inversion, QTc 386 4/26/22- sinus 69, normal axis, inferolateral infarct, precordial ST, lateral TWI, QTc 407 7/26/22- sinus 61, normal axis, inferolateral infarct, precordial ST elevation, lateral TWI, QTc 409 4/4/23- sinus 67, normal axis, inferolateral infarct, precordial ST elevation, QTc 394 11/28/22- sinus 71, normal axis, inferolateral infarct, lateral T-wave inversions, QTc 409 5/30/23- sinus 71, normal axis, inferolateral infarct, lateral T-wave inversion, QTc 407 10/9/23- sinus 68, 1st degree AV block, inferolateral infarct (old), T-wave inversion lateral leads, QTc 419 4/4/24- Sinus 65, 1st degree, inferolatral infarct (old) with T-wave invesion lateral leads, QTc 410 [de-identified] : 11/22/21- LV EF 40-45%, apical aneurysmal with slow flow, 0.6 x 0.7 cm thrombus [de-identified] : 2/26/22- \par  Summary:\par   1. Normal left ventricular internal cavity size.\par   2. There is moderate concentric left ventricular hypertrophy.\par   3. Entire apex, basal inferoseptal segment, and basal inferior segment \par  are abnormal as described above.\par   4. Ischemic cardiomyopathy.\par   5. Mildly decreased global left ventricular systolic function.\par   6. Left ventricular ejection fraction, by visual estimation, is 40 to \par  45%.\par   7. Normal right ventricular size and function.\par   8. Mild thickening of the anterior and posterior mitral valve leaflets.\par   9. Trace mitral valve regurgitation.\par  10. There is no evidence of pericardial effusion.\par  11. Endocardial visualization was enhanced with intravenous echo contrast. [de-identified] : 2/28/22- VENTRICLES: No LV gram was performed; however, a recent echocardiogram\par  demonstrated an EF of 45 %.\par  CORONARY VESSELS: The coronary circulation is right dominant.\par  LM:   --  LM: Normal.\par  LAD:   --  Mid LAD: There was a diffuse 90 % stenosis at the site of a\par  prior stent, in-stent. There was LOLIS grade 3 flow through the vessel\par  (brisk flow).\par  --  Distal LAD: There was a diffuse 80 % stenosis in-stent.\par  --  D1: Normal.\par  CX:   --  Circumflex: The vessel was medium to large sized.\par  --  Mid circumflex: There was a diffuse 10 % stenosis at the site of a\par  prior stent.\par  RCA:   --  RCA: The vessel arose abnormally high from the right sinus of\par  Valsalva.\par  --  Mid RCA: There was a diffuse 20 % stenosis at the site of a prior\par  stent.\par  COMPLICATIONS: No complications occurred during the cath lab visit.\par  DIAGNOSTIC IMPRESSIONS: Extensive instent restenosis in mLAD in atleast 2\par  layers of stents.\par  Prior stents in RCA and LCX patent\par  Aortic valve not crossed and ventriculography not performed due to the\par  presence of an LV thrombus (11/21)\par  DIAGNOSTIC RECOMMENDATIONS: Medical management of CAD and angina with\par  optimal medical therapy\par  Consider Brachytherapy as a possible treatment modality\par  INTERVENTIONAL IMPRESSIONS: Extensive instent restenosis in mLAD in atleast\par  2 layers of stents.\par  Prior stents in RCA and LCX patent\par  Aortic valve not crossed and ventriculography not performed due to the\par  presence of an LV thrombus (11/21)\par  INTERVENTIONAL RECOMMENDATIONS: Medical management of CAD and angina with\par  optimal medical therapy\par  Consider Brachytherapy as a possible treatment modality\par  \par  4/13/22- \par  \par  Following intervention there is a 80 % residual stenosis. There was\par  LOLIS Flow 3 before the procedure and LOLIS Flow 3 following\par  the procedure. Following intervention there is a partially improved\par  angiographic appearance.

## 2024-04-04 NOTE — HISTORY OF PRESENT ILLNESS
[FreeTextEntry1] : 53M Creole-speaking man with history of CAD with multivessel stentings and MI (2017 at [facility] with restenosis of RCA and LAD s/p PEPE to mRCA), LV apical thrombus (previously been on warfarin since  discontinued on ), HFrEF (35-40% in ), syncope with 2:1 heart block s/p Medtronic AICD 2018 (DDD ), CVA (2019) and with unstable angina in 2019 s/p PCI/stent to mLCx at [facility], previously follows at [facility] with Dr. Crook but since left practice without follow up, presented to [facility] on 20 with unstable angina, serial Troponin negative, repeaT TTE with EF 40-45% (upon my own review) and resolved LV apical thrombus (known since 2019 but unclear reason remains on warfarin and self discontinued clopidogrel 75mg since 2019), underwent LHC found with patent stents in LCx, RCA and moderate-severe instent restenosis of [id number] LAD, given small caliber size of the vessel decided medical management with antianginal (increased Imdur to 120mg), presented for initial outpatient cardiology evaluation on 9/15/20, added Ranexa to Imdur for severe angina, still with refractory symptoms and he presented again to [facility] on 20, underwent PCI/PEPE x2 to dLAD for 95% severe instent restenosis, repeat TTE off warfarin found with apical akinesis/aneurysm with calcified mural thrombus, LV EF 40-45%, had 21 beats WCT, increased carvedilol dose to 25mg BID, discharged home on 20, prior visit 2021 after returned back from [facility] with borderline SBP reduced carvedilol dose to 12.5mg, last visit 2021 interval with recurrent CVAs had repeat TTE with Definity confirmed recurrence of LV apical thrombus, restarted back on warfarin, last seen in office 2022, interval readmitted to [facility] 22 for chest pain, CTA done with probable focal dissection at the distal abdominal aorta, repeat LHC shown extensive instent restenosis in mLAD with prior 2 layers of stent, patent RCA and LCx stents, patient referred for brachytherapy at [facility] with Dr. Hannah Cheatham found 90% ISR at mLAD unable to deliver catheter to the distal segment with partial improvement of proximal half of the vessel (80% mLAD), last visit 2022 still with recurrent stable anginal symptoms, interval followed with EPS/Dr. Barfield had 1 week Holter done with 6% PVCs, last seen 2022, interval followed with EP pending repeat MCOT on future follow up if PVC burden >10% the required intervention, last office visit 2023 overall stable and therapeutic INR, interval been to [facility] on 23 for sudden fatigue, elevated BP and chest pain, last seen 2023, recent INR been therapeutic initially was planning for elective colonoscopy but after stool occult blood negative told not needed, last seen 10/2023, recent INR remain therapeutic 2.4, interval seen by EP/Dr. Aguilar repeat Holter done with 2.6% PVCs burden, last seen 2024 interval was at [facility] 3/11/24 for labile HTN SBP 180s with dizziness, repeat Echo LV EF 40-45%, INR was subtherapeutic and was discharged on warfarin 4mg but repeat 3/27 outpatient found INR 5.3, dose reduced back to 3mg and repeat INR 3.0, presents for follow up.    Patient presents with his cousin for the visit interpreting for the visit.  Reports still have labile elevated -180s at home for few days, adherence with his medications also has chest pain for 30 minutes at rest today, since resolved. Still having intermittent nonspecific hot-sensation over this L-chest and tingling in his extremities, no AICD shock or syncope.    Prior visit 2024 Patient presents with spouse for the visit interpreting for him, reports he had L-sided chest pain for 1 week recently, mainly at rest, also had acid reflux with vomiting once after ate spicy foods, also having chest/abdomen pain when he does weight/sit up exercise. He was recommended to take Total Restore supplement for gas pain and regulate his BM, did not follow up with GI since with intermittent lower abdominal pain. Has been feeling dizzy with vertigo symptoms, no recent syncope.    Prior visit 10/2023:  Reports feeling well, overall unchanged baseline intermittent chest pain and still have labile SBP at home high and low, still feeling hot with head discomfort even in the colder weather and would see his SBP increases from 110 to 150s in 15-20 minutes.  Follows with a PCP reportedly had thyroid check recently. Denies bleeding except when he brushes his teeth, denies falls. Denies AICD discharge or syncope.   Prior visit 2023:  Patient presents with his spouse for the visit.  He reports last week with sudden fatigue and lethargy around 8pm with SBP 150s, was normal during the day and walked without limitations, he was taken to [facility], had CT head done, serial Troponin flat, but underwent diagnostic left heart cath via RRA without intervention, also reportedly AICD was interrogated without event, denies AICD shock. Has been feeling lightheaded and dizzy, alos gets headaches when using Imdur.     Prior visit 2023: Recent INR 2.4 been therapeutic at goal >4 months.   Reports still with labile BP with stable intermittent chest pain, active with walking and exercise without chest pain, denies bleeding. No ICD shock and recently had remote monitoring set up at home. Reports constipation and cramps in his calves at times.    Prior visit 2022:  Recent INR 2.6 on , but missed August and October INR checks.   Patient presents with spouse for the visit. No change in his symptoms with chronic angina but still able to ambulate for 45 minutes. Sees some blood after he brush his teeth pending dental eval, denies other bleeding except for noticing some dark colored stool at time. He has completed his COVID vaccines about 3 month ago. Denies AICD shock, has intermittent lower abdominal pain about once monthly. Reports skip his BP meds at time when he sees -110s.    Prior visit 2022:  Monthly INR been therapeutic 2.4  Patient presents with his spouse for the visit.  Still having recurrent resting chest pain at rest can last for 30 minutes and at times up to 2 days, denies pain with ambulation can walk up to 30 minutes, had tried sublingual nitro no effect except dropping his BP. Denies bleeding on warfarin and clopidogrel use. Reports intermittent cough for 2 months concern if its from lisinopril use.     Prior visit 2022:  Patient again presents with his partner for the visit.  Still having daily anginal chest pain at rest lasting for 15-20 minutes, has not used sublingual nitroglycerin but can lead to low SBP 90s and dizziness. Been adherent with all of his other medications. No ICD shock or syncope, has intermittent palpitations.   Continues to decline COVID vacccine due to not feeling with dizziness, bodyaches   Prior visit 2022:  Still has chronic angina, takes sublingual only once. Has labile BP with high readings SBP 170s but then would be low after nitroglycerin use. Denies bleeding on warfarin/clopidogrel use.   Still not yet obtain COVID vaccine.   Prior visit 2021:  He's been living in [place] with his significant other, had been hospitalized twice there, 1st episode episode in 2021 for chest pain had Echo and nuclear stress test done showed no ischemia, 2nd admission 10/2021 for acute stroke symptoms (facial droop and L-sided weakness for few hours), symptoms completely resolved once in the ER, noted to be hypertensive 184/104, did not undergo MRI brain cause he was claustrophobic but repeat CT head done no new infarct, LDL 89 increased atorvastatin to 80mg.   Been feeling well since returning back to [place], still has chronic angina at rest, able to walk for 30 minutes, still with nonspecific paresthesia sensation of hotness in his body and feeling dizzy after his medications use.   Remains hesitant about COVID vaccine.   Prior visit 2021 Patient presents with is sister-in-law requesting her to interpret.   He left for [facility] since 10/2020 and just returned this week, still with intermittent chronic angina, had one episode yesterday lasted for over 1 hour, does not take sublingual nitro as reports cause his BP to be high, but when take his meds would see SBP 90-100s and would take Imdur 30 BID instead, self paying for all his medications. Having intermittent palpitations lasting few seconds and lasted for few hours, denies ICD shock. Currently with his travel visa expiring in July.   Has not yet schedule for the COVID vaccine due to concern from his wife.    Prior visit 2020: He reports still having chest pain daily and sometime can last throughout the day, has not tried sublingual nitro. Sometimes feel as fire sensation, doesn't take any PPI/antacids. He's planning to return to [facility] for 4-5 months to resume PT for his R-leg from prior stroke.   Prior visit 9/15/20: Patient reports SBP 80-90s with dizziness at times when he uses Imdur 120mg, continues to have L-sided chest pain but mainly at night and in the morning, at times also with ambulation, typically last for about 30 minutes. He remains compliant with ASA/clopidogrel, off warfarin. He is planning ot return to [facility] in about 1-2 month as his visa is about to be .    Lipid panel: TG 58, , HDL 39, LDL 54

## 2024-04-04 NOTE — PHYSICAL EXAM

## 2024-04-04 NOTE — DISCUSSION/SUMMARY
[FreeTextEntry1] : 53M Creole-speaking man with history of CAD with multivessel stentings and MI (2017 at [facility] with restenosis of RCA and LAD s/p PEPE to mRCA), LV apical thrombus (previously been on warfarin since 2017 discontinued on 2020), HFrEF (35-40% in 2018), syncope with 2:1 heart block s/p Medtronic AICD 5/2018 (DDD ), CVA (6/2019) and with unstable angina in 11/2019 s/p PCI/stent to mLCx at [facility], previously follows at [facility] with Dr. Crook but since left practice without follow up, presented to [facility] on 9/2/20 with unstable angina, serial Troponin negative, repeaT TTE with EF 40-45% (upon my own review) and resolved LV apical thrombus (known since 11/2019 but unclear reason remains on warfarin and self discontinued clopidogrel 75mg since 7/2019), underwent LHC found with patent stents in LCx, RCA and moderate-severe instent restenosis of [id number] LAD, given small caliber size of the vessel decided medical management with antianginal (increased Imdur to 120mg), presented for initial outpatient cardiology evaluation on 9/15/20, added Ranexa to Imdur for severe angina, still with refractory symptoms and he presented again to [facility] on 9/23/20, underwent PCI/PEPE x2 to dLAD for 95% severe instent restenosis, repeat TTE off warfarin found with apical akinesis/aneurysm with calcified mural thrombus, LV EF 40-45%, had 21 beats WCT, increased carvedilol dose to 25mg BID, discharged home on 9/24/20, prior visit 5/2021 after returned back from [facility] with borderline SBP reduced carvedilol dose to 12.5mg, last visit 11/2021 interval with recurrent CVAs had repeat TTE with Definity confirmed recurrence of LV apical thrombus, restarted back on warfarin, last seen in office 1/2022, interval readmitted to [facility] 2/28/22 for chest pain, CTA done with probable focal dissection at the distal abdominal aorta, repeat LHC shown extensive instent restenosis in mLAD with prior 2 layers of stent, patent RCA and LCx stents, patient referred for brachytherapy at [facility] with Dr. Hannah Cheatham found 90% ISR at mLAD unable to deliver catheter to the distal segment with partial improvement of proximal half of the vessel (80% mLAD), last visit 4/2022 still with recurrent stable anginal symptoms, interval followed with EPS/Dr. Barfield had 1 week Holter done with 6% PVCs, last seen 11/2022, interval followed with EP pending repeat MCOT on future follow up if PVC burden >10% the required intervention, last office visit 4/2023 overall stable and therapeutic INR, interval been to [facility] on 5/24/23 for sudden fatigue, elevated BP and chest pain, last seen 5/2023, recent INR been therapeutic initially was planning for elective colonoscopy but after stool occult blood negative told not needed, last seen 10/2023, recent INR remain therapeutic 2.4, interval seen by EP/Dr. Aguilar repeat Holter done with 2.6% PVCs burden, last seen 1/2024 interval was at [facility] 3/11/24 for labile HTN SBP 180s with dizziness, repeat Echo LV EF 40-45%, INR was subtherapeutic and was discharged on warfarin 4mg but repeat 3/27 outpatient found INR 5.3, dose reduced back to 3mg and repeat INR 3.0, presents for follow up.     Unclear etiology of recent labile HTN as office BP has been well controlled, will obtain 24hrs-BP monitor and to bring in home BP cuff on next visit to correlate. He has unchanged stable chronic anginal symptoms, reiterated that unfortunately no interventional option possible, not candidate for CABG as no distal target, continue antianginals on Imdur and Ranexa, reassured no need to be overtly concern about anginal chest pain. Prior EKG unchanged old infarct with chronic aneurysmal changes. Still with labile BP after Imdur/nitro use, advised not to skip antihypertensives/GDMT unless SBP <100 or with dizziness. Advised to follow up with GI for EGD/colonoscopy if recurrent abdominal pain, trial of pantoprazole for GERD.   Prior CVA in setting of recurrent LV thrombus when off anticoagulant, resumed on warfarin with therapeutic monthly INR checks has been therapeutic. AICD monitoring with EP.     1. CAD with multiple stents and instent-restenosis of dLAD s/p PEPE x2 9/2020 and failed brachytherapy 4/2022- off ASA and continue clopidogrel with warfarin; continue carvedilol to 12.5mg BID and continue Imdur 30mg if BP allows and Ranexa 1000mg BID, advised to use sublingual nitrate PRN limited by low BP effect, Continue high dose atorvastatin 80mg.   2. Ischemic cardiomyopathy, HFrEF- ACC/AHA stage B- continue carvedilol 12.5g BID and losartan 25mg due to borderline low BP, continue spironolactone 25mg. ICD remote monitoring and follow up with EP.   3. LV apical thrombus with prior CVA- was off warfarin since 2020, repeat TTE with echocontrast with recurrence of LV thrombus due to aneurysmal region, continue warfarin INR goal 2-3 indefinitely with monthly INR checks, prior and recent repeat Echo with resolution of thrombus but high risk of recurrence given aneurysmal segment.    4. History of 2:1 heart block, prior NSVT- AICD interrogated during hospitalization; continue carvedilol, EPS followup for ICD monitoring, still with 7yrs battery remaining, no defib or VT. Holter with 6% PVCs burden on carvedilol.   5.Prior CTA abdomen questionable distal focal dissection?-  abdominal aortic Duplex confirmed findings in 5/2022, did not see vascular surgery advised to follow up, continue strict BP control.    6. Labile BP- await 24hr BP monitor to be done.   7. GERD/abdominal discomfort- trial of PPI, advised to follow up with GI.      Follow up in 3 months.    [EKG obtained to assist in diagnosis and management of assessed problem(s)] : EKG obtained to assist in diagnosis and management of assessed problem(s)

## 2024-04-12 LAB
INR PPP: 3.03 RATIO
PT BLD: 33.1 SEC

## 2024-04-19 PROBLEM — I21.9 ACUTE MYOCARDIAL INFARCTION, UNSPECIFIED: Chronic | Status: ACTIVE | Noted: 2018-05-11

## 2024-05-01 ENCOUNTER — NON-APPOINTMENT (OUTPATIENT)
Age: 54
End: 2024-05-01

## 2024-05-01 ENCOUNTER — APPOINTMENT (OUTPATIENT)
Dept: ELECTROPHYSIOLOGY | Facility: CLINIC | Age: 54
End: 2024-05-01
Payer: COMMERCIAL

## 2024-05-01 VITALS
BODY MASS INDEX: 29.7 KG/M2 | TEMPERATURE: 98.6 F | HEIGHT: 68 IN | SYSTOLIC BLOOD PRESSURE: 108 MMHG | HEART RATE: 64 BPM | WEIGHT: 196 LBS | DIASTOLIC BLOOD PRESSURE: 64 MMHG | OXYGEN SATURATION: 96 %

## 2024-05-01 DIAGNOSIS — I51.3 INTRACARDIAC THROMBOSIS, NOT ELSEWHERE CLASSIFIED: ICD-10-CM

## 2024-05-01 DIAGNOSIS — I25.10 ATHEROSCLEROTIC HEART DISEASE OF NATIVE CORONARY ARTERY W/OUT ANGINA PECTORIS: ICD-10-CM

## 2024-05-01 DIAGNOSIS — I45.9 CONDUCTION DISORDER, UNSPECIFIED: ICD-10-CM

## 2024-05-01 DIAGNOSIS — I50.20 UNSPECIFIED SYSTOLIC (CONGESTIVE) HEART FAILURE: ICD-10-CM

## 2024-05-01 DIAGNOSIS — I63.9 CEREBRAL INFARCTION, UNSPECIFIED: ICD-10-CM

## 2024-05-01 DIAGNOSIS — I49.3 VENTRICULAR PREMATURE DEPOLARIZATION: ICD-10-CM

## 2024-05-01 PROCEDURE — 93289 INTERROG DEVICE EVAL HEART: CPT

## 2024-05-01 PROCEDURE — 99214 OFFICE O/P EST MOD 30 MIN: CPT | Mod: 25

## 2024-05-01 PROCEDURE — 93000 ELECTROCARDIOGRAM COMPLETE: CPT | Mod: 59

## 2024-05-01 NOTE — DISCUSSION/SUMMARY
[FreeTextEntry1] : In summary, the patient is a 53-year-old Creole speaking male with a history of CAD/MI s/p multiple PCIs, LV dysfunction with EF of 40-45%, LV apical thrombus (recurrent), CVA from LV thrombus (on warfarin), PVCs and syncope with 2:1 AV block s/p dual chamber ICD in 5/2018. Holter monitoring was repeated in 11/2023 and revealed a lower burden of PVC (2.6%). He is on Coreg 12.5 mg bid. He is asymptomatic for his PVCs. Device interrogation today reveals normal function, good battery life and no significant arrhythmias. V pacing is 0.2%. No change in ongoing therapy is required.   F/u in 6 months.   [EKG obtained to assist in diagnosis and management of assessed problem(s)] : EKG obtained to assist in diagnosis and management of assessed problem(s)

## 2024-05-01 NOTE — PHYSICAL EXAM
[No Acute Distress] : no acute distress [Normal Venous Pressure] : normal venous pressure [Normal S1, S2] : normal S1, S2 [Clear Lung Fields] : clear lung fields [No Respiratory Distress] : no respiratory distress  [No Edema] : no edema [Moves all extremities] : moves all extremities [Alert and Oriented] : alert and oriented

## 2024-05-01 NOTE — HISTORY OF PRESENT ILLNESS
[FreeTextEntry1] : The patient is a pleasant 53-year-old Creole speaking male presenting for follow up today (previous patient of Dr. Barfield). He has a history of CAD/MI s/p multiple PCIs, LV dysfunction with EF of 40%, LV apical thrombus, CVA from LV thrombus (on warfarin), PVCs and syncope with 2:1 AV block s/p single chamber ICD in 5/2018. He was on Coreg 12.5 mg bid. Repeat Holter monitoring was performed in 11/2023 which revealed a low PVC burden of 2.6%. Last TTE was performed in 3/2024 with reported EP of 40-45% with an akinetic apex. The patient denies any symptoms of palpitations, shortness of breath, dizziness, chest pain, syncope or extremity edema.

## 2024-05-01 NOTE — REASON FOR VISIT
[Arrhythmia/ECG Abnorrmalities] : arrhythmia/ECG abnormalities [FreeTextEntry1] : INCOMPLETE NOTE [FreeTextEntry3] : Dr Rush

## 2024-05-01 NOTE — REVIEW OF SYSTEMS
[Negative] : Heme/Lymph [Feeling Fatigued] : not feeling fatigued [SOB] : no shortness of breath [Dyspnea on exertion] : not dyspnea during exertion [Chest Discomfort] : no chest discomfort [Lower Ext Edema] : no extremity edema [Palpitations] : no palpitations [Orthopnea] : no orthopnea [Syncope] : no syncope [Dizziness] : no dizziness [Easy Bleeding] : no tendency for easy bleeding [FreeTextEntry5] : see HPI

## 2024-05-14 ENCOUNTER — LABORATORY RESULT (OUTPATIENT)
Age: 54
End: 2024-05-14

## 2024-06-04 NOTE — PHYSICAL THERAPY INITIAL EVALUATION ADULT - LEVEL OF INDEPENDENCE: SIT/SUPINE, REHAB EVAL
Patient returned to sit at EOB/unable to perform
Essential hypertension
Ruddy Chairez  Interventional Cardiology  [address] [place] [address]  Phone: [phone]  Fax: [phone]  Follow Up Time: 2 weeks  
CAD (coronary artery disease)

## 2024-06-13 LAB
INR PPP: 2.07 RATIO
PT BLD: 22.9 SEC

## 2024-06-20 RX ORDER — WARFARIN 3 MG/1
3 TABLET ORAL
Qty: 90 | Refills: 1 | Status: ACTIVE | COMMUNITY
Start: 2021-12-07 | End: 1900-01-01

## 2024-07-08 ENCOUNTER — LABORATORY RESULT (OUTPATIENT)
Age: 54
End: 2024-07-08

## 2024-07-09 ENCOUNTER — NON-APPOINTMENT (OUTPATIENT)
Age: 54
End: 2024-07-09

## 2024-07-09 ENCOUNTER — APPOINTMENT (OUTPATIENT)
Dept: CARDIOLOGY | Facility: CLINIC | Age: 54
End: 2024-07-09
Payer: COMMERCIAL

## 2024-07-09 VITALS
HEART RATE: 70 BPM | OXYGEN SATURATION: 97 % | BODY MASS INDEX: 30.31 KG/M2 | SYSTOLIC BLOOD PRESSURE: 110 MMHG | HEIGHT: 68 IN | DIASTOLIC BLOOD PRESSURE: 66 MMHG | WEIGHT: 200 LBS

## 2024-07-09 DIAGNOSIS — I51.3 INTRACARDIAC THROMBOSIS, NOT ELSEWHERE CLASSIFIED: ICD-10-CM

## 2024-07-09 DIAGNOSIS — Z79.01 LONG TERM (CURRENT) USE OF ANTICOAGULANTS: ICD-10-CM

## 2024-07-09 DIAGNOSIS — I20.9 ANGINA PECTORIS, UNSPECIFIED: ICD-10-CM

## 2024-07-09 PROCEDURE — 93000 ELECTROCARDIOGRAM COMPLETE: CPT

## 2024-07-09 PROCEDURE — G2211 COMPLEX E/M VISIT ADD ON: CPT | Mod: NC

## 2024-07-09 PROCEDURE — 99214 OFFICE O/P EST MOD 30 MIN: CPT | Mod: 25

## 2024-07-30 ENCOUNTER — NON-APPOINTMENT (OUTPATIENT)
Age: 54
End: 2024-07-30

## 2024-07-31 ENCOUNTER — APPOINTMENT (OUTPATIENT)
Dept: ELECTROPHYSIOLOGY | Facility: CLINIC | Age: 54
End: 2024-07-31
Payer: COMMERCIAL

## 2024-07-31 PROCEDURE — 93296 REM INTERROG EVL PM/IDS: CPT

## 2024-07-31 PROCEDURE — 93295 DEV INTERROG REMOTE 1/2/MLT: CPT

## 2024-08-14 ENCOUNTER — LABORATORY RESULT (OUTPATIENT)
Age: 54
End: 2024-08-14

## 2024-09-10 ENCOUNTER — LABORATORY RESULT (OUTPATIENT)
Age: 54
End: 2024-09-10

## 2024-09-19 DIAGNOSIS — Z79.01 LONG TERM (CURRENT) USE OF ANTICOAGULANTS: ICD-10-CM

## 2024-10-07 ENCOUNTER — LABORATORY RESULT (OUTPATIENT)
Age: 54
End: 2024-10-07

## 2024-10-10 DIAGNOSIS — Z79.01 LONG TERM (CURRENT) USE OF ANTICOAGULANTS: ICD-10-CM

## 2024-10-30 ENCOUNTER — APPOINTMENT (OUTPATIENT)
Dept: ELECTROPHYSIOLOGY | Facility: CLINIC | Age: 54
End: 2024-10-30
Payer: COMMERCIAL

## 2024-10-30 ENCOUNTER — NON-APPOINTMENT (OUTPATIENT)
Age: 54
End: 2024-10-30

## 2024-10-30 PROCEDURE — 93295 DEV INTERROG REMOTE 1/2/MLT: CPT

## 2024-10-30 PROCEDURE — 93296 REM INTERROG EVL PM/IDS: CPT

## 2024-11-19 ENCOUNTER — NON-APPOINTMENT (OUTPATIENT)
Age: 54
End: 2024-11-19

## 2024-11-19 ENCOUNTER — APPOINTMENT (OUTPATIENT)
Dept: CARDIOLOGY | Facility: CLINIC | Age: 54
End: 2024-11-19
Payer: COMMERCIAL

## 2024-11-19 VITALS
OXYGEN SATURATION: 97 % | WEIGHT: 205 LBS | BODY MASS INDEX: 31.07 KG/M2 | HEIGHT: 68 IN | SYSTOLIC BLOOD PRESSURE: 105 MMHG | DIASTOLIC BLOOD PRESSURE: 65 MMHG | HEART RATE: 71 BPM

## 2024-11-19 DIAGNOSIS — Z79.01 LONG TERM (CURRENT) USE OF ANTICOAGULANTS: ICD-10-CM

## 2024-11-19 DIAGNOSIS — I25.10 ATHEROSCLEROTIC HEART DISEASE OF NATIVE CORONARY ARTERY W/OUT ANGINA PECTORIS: ICD-10-CM

## 2024-11-19 DIAGNOSIS — I50.20 UNSPECIFIED SYSTOLIC (CONGESTIVE) HEART FAILURE: ICD-10-CM

## 2024-11-19 PROCEDURE — 99214 OFFICE O/P EST MOD 30 MIN: CPT | Mod: 25

## 2024-11-19 PROCEDURE — 93000 ELECTROCARDIOGRAM COMPLETE: CPT

## 2025-01-09 ENCOUNTER — LABORATORY RESULT (OUTPATIENT)
Age: 55
End: 2025-01-09

## 2025-01-29 ENCOUNTER — NON-APPOINTMENT (OUTPATIENT)
Age: 55
End: 2025-01-29

## 2025-01-29 ENCOUNTER — APPOINTMENT (OUTPATIENT)
Dept: ELECTROPHYSIOLOGY | Facility: CLINIC | Age: 55
End: 2025-01-29
Payer: COMMERCIAL

## 2025-01-29 PROCEDURE — 93296 REM INTERROG EVL PM/IDS: CPT

## 2025-01-29 PROCEDURE — 93295 DEV INTERROG REMOTE 1/2/MLT: CPT

## 2025-02-03 ENCOUNTER — LABORATORY RESULT (OUTPATIENT)
Age: 55
End: 2025-02-03

## 2025-02-12 LAB
INR PPP: 1.86 RATIO
PT BLD: 22 SEC

## 2025-02-19 ENCOUNTER — LABORATORY RESULT (OUTPATIENT)
Age: 55
End: 2025-02-19

## 2025-03-12 ENCOUNTER — LABORATORY RESULT (OUTPATIENT)
Age: 55
End: 2025-03-12

## 2025-04-30 ENCOUNTER — NON-APPOINTMENT (OUTPATIENT)
Age: 55
End: 2025-04-30

## 2025-04-30 ENCOUNTER — APPOINTMENT (OUTPATIENT)
Dept: ELECTROPHYSIOLOGY | Facility: CLINIC | Age: 55
End: 2025-04-30
Payer: COMMERCIAL

## 2025-04-30 PROCEDURE — 93296 REM INTERROG EVL PM/IDS: CPT

## 2025-04-30 PROCEDURE — 93295 DEV INTERROG REMOTE 1/2/MLT: CPT

## 2025-05-22 ENCOUNTER — APPOINTMENT (OUTPATIENT)
Dept: CARDIOLOGY | Facility: CLINIC | Age: 55
End: 2025-05-22

## 2025-05-23 NOTE — PHYSICAL EXAM
CHARLIE/FADI [facility] Home Nursing [address] [id number] [phone] --                    CHARLIE informed by MD [facility]. CHARLIE informed [facility] home care liaison [facility] home care liaison know this.    CHARLIE confirmed with pt's son Dallin that he will be here at 1700 for transport at discharge.        [Normal] : alert and oriented, normal memory [de-identified] : L-sided AICD site intact

## 2025-07-30 ENCOUNTER — NON-APPOINTMENT (OUTPATIENT)
Age: 55
End: 2025-07-30

## 2025-07-30 ENCOUNTER — APPOINTMENT (OUTPATIENT)
Dept: ELECTROPHYSIOLOGY | Facility: CLINIC | Age: 55
End: 2025-07-30
Payer: COMMERCIAL

## 2025-07-30 PROCEDURE — 93295 DEV INTERROG REMOTE 1/2/MLT: CPT

## 2025-07-30 PROCEDURE — 93296 REM INTERROG EVL PM/IDS: CPT
